# Patient Record
Sex: MALE | Race: WHITE | NOT HISPANIC OR LATINO | Employment: OTHER | URBAN - METROPOLITAN AREA
[De-identification: names, ages, dates, MRNs, and addresses within clinical notes are randomized per-mention and may not be internally consistent; named-entity substitution may affect disease eponyms.]

---

## 2019-05-14 ENCOUNTER — TRANSFERRED RECORDS (OUTPATIENT)
Dept: HEALTH INFORMATION MANAGEMENT | Facility: CLINIC | Age: 74
End: 2019-05-14

## 2019-05-23 ENCOUNTER — TRANSFERRED RECORDS (OUTPATIENT)
Dept: HEALTH INFORMATION MANAGEMENT | Facility: CLINIC | Age: 74
End: 2019-05-23

## 2019-09-03 ENCOUNTER — TRANSFERRED RECORDS (OUTPATIENT)
Dept: HEALTH INFORMATION MANAGEMENT | Facility: CLINIC | Age: 74
End: 2019-09-03

## 2019-11-14 ENCOUNTER — TRANSFERRED RECORDS (OUTPATIENT)
Dept: HEALTH INFORMATION MANAGEMENT | Facility: CLINIC | Age: 74
End: 2019-11-14

## 2020-03-31 RX ORDER — ASPIRIN 81 MG/1
81 TABLET ORAL DAILY
COMMUNITY
End: 2022-01-25 | Stop reason: ALTCHOICE

## 2020-03-31 RX ORDER — PIOGLITAZONEHYDROCHLORIDE 15 MG/1
15 TABLET ORAL DAILY
COMMUNITY
End: 2020-03-31

## 2020-04-02 ENCOUNTER — OFFICE VISIT (OUTPATIENT)
Dept: FAMILY MEDICINE | Facility: CLINIC | Age: 75
End: 2020-04-02

## 2020-04-02 VITALS
DIASTOLIC BLOOD PRESSURE: 68 MMHG | HEIGHT: 75 IN | HEART RATE: 76 BPM | WEIGHT: 240 LBS | RESPIRATION RATE: 20 BRPM | SYSTOLIC BLOOD PRESSURE: 120 MMHG | TEMPERATURE: 97.8 F | BODY MASS INDEX: 29.84 KG/M2

## 2020-04-02 DIAGNOSIS — I10 ESSENTIAL HYPERTENSION: ICD-10-CM

## 2020-04-02 DIAGNOSIS — E78.2 MIXED HYPERLIPIDEMIA: ICD-10-CM

## 2020-04-02 DIAGNOSIS — Z01.818 PRE-OP EXAM: Primary | ICD-10-CM

## 2020-04-02 DIAGNOSIS — Z71.89 ACP (ADVANCE CARE PLANNING): ICD-10-CM

## 2020-04-02 DIAGNOSIS — S82.892D CLOSED FRACTURE OF LEFT ANKLE WITH ROUTINE HEALING, SUBSEQUENT ENCOUNTER: ICD-10-CM

## 2020-04-02 DIAGNOSIS — E11.9 TYPE 2 DIABETES MELLITUS WITHOUT COMPLICATION, WITHOUT LONG-TERM CURRENT USE OF INSULIN (H): ICD-10-CM

## 2020-04-02 DIAGNOSIS — Z76.89 HEALTH CARE HOME: ICD-10-CM

## 2020-04-02 PROBLEM — K57.92 ACUTE DIVERTICULITIS: Status: ACTIVE | Noted: 2020-03-06

## 2020-04-02 PROBLEM — E11.65 UNCONTROLLED TYPE 2 DIABETES MELLITUS WITH HYPERGLYCEMIA (H): Status: ACTIVE | Noted: 2019-01-10

## 2020-04-02 LAB — HBA1C MFR BLD: 8.9 % (ref 4–7)

## 2020-04-02 PROCEDURE — 83036 HEMOGLOBIN GLYCOSYLATED A1C: CPT | Performed by: FAMILY MEDICINE

## 2020-04-02 PROCEDURE — 36415 COLL VENOUS BLD VENIPUNCTURE: CPT | Performed by: FAMILY MEDICINE

## 2020-04-02 PROCEDURE — 99203 OFFICE O/P NEW LOW 30 MIN: CPT | Performed by: FAMILY MEDICINE

## 2020-04-02 RX ORDER — ATORVASTATIN CALCIUM 20 MG/1
20 TABLET, FILM COATED ORAL DAILY
COMMUNITY
Start: 2020-04-02 | End: 2020-07-30

## 2020-04-02 RX ORDER — LEVOTHYROXINE SODIUM 50 UG/1
TABLET ORAL DAILY
COMMUNITY
Start: 2020-04-02 | End: 2020-07-30

## 2020-04-02 RX ORDER — GLIMEPIRIDE 2 MG/1
TABLET ORAL
COMMUNITY
Start: 2020-04-02 | End: 2020-07-30

## 2020-04-02 RX ORDER — PIOGLITAZONEHYDROCHLORIDE 15 MG/1
TABLET ORAL DAILY
COMMUNITY
Start: 2020-04-02 | End: 2020-04-02

## 2020-04-02 RX ORDER — PIOGLITAZONEHYDROCHLORIDE 45 MG/1
45 TABLET ORAL DAILY
COMMUNITY
Start: 2020-04-02 | End: 2020-07-30

## 2020-04-02 ASSESSMENT — MIFFLIN-ST. JEOR: SCORE: 1901.32

## 2020-04-02 NOTE — NURSING NOTE
Gaetano Sheriff is here for a pre-op exam.    Questioned patient about current smoking habits.  Pt. has never smoked.  PULSE regular  My Chart: declines  CLASSIFICATION OF OVERWEIGHT AND OBESITY BY BMI                        Obesity Class           BMI(kg/m2)  Underweight                                    < 18.5  Normal                                         18.5-24.9  Overweight                                     25.0-29.9  OBESITY                     I                  30.0-34.9                             II                 35.0-39.9  EXTREME OBESITY             III                >40                            Patient's  BMI Body mass index is 30.4 kg/m .  http://hin.nhlbi.nih.gov/menuplanner/menu.cgi  Pre-visit planning  Immunizations - up to date  Colonoscopy -   Mammogram -   Asthma -   PHQ9 -    SHERWIN-7 -

## 2020-04-02 NOTE — PROGRESS NOTES
[facility]  [address]  [address]  [facility] [address]  [phone]  Dept: [phone]    PRE-OP EVALUATION:  Today's date: 2020    Gaetano Sheriff (: 1945) presents for pre-operative evaluation assessment as requested by Dr. Apple.  He requires evaluation and anesthesia risk assessment prior to undergoing surgery/procedure for treatment of left ankle .    Proposed Surgery/ Procedure: Left ankle  Date of Surgery/ Procedure: 20  Time of Surgery/ Procedure: 11am  Hospital/Surgical Facility: [facility]  Fax number for surgical facility:   Primary Physician: Lm Garnica  Type of Anesthesia Anticipated: to be determined    Patient has a Health Care Directive or Living Will:  YES     1. NO - Do you have a history of heart attack, stroke, stent, bypass or surgery on an artery in the head, neck, heart or legs?  2. NO - Do you ever have any pain or discomfort in your chest?  3. NO - Do you have a history of  Heart Failure?  4. NO - Are you troubled by shortness of breath when: walking on the level, up a slight hill or at night?  5. NO - Do you currently have a cold, bronchitis or other respiratory infection?  6. NO - Do you have a cough, shortness of breath or wheezing?  7. NO - Do you sometimes get pains in the calves of your legs when you walk?  8. NO - Do you or anyone in your family have previous history of blood clots?  9. NO - Do you or does anyone in your family have a serious bleeding problem such as prolonged bleeding following surgeries or cuts?  10. NO - Have you ever had problems with anemia or been told to take iron pills?  11. NO - Have you had any abnormal blood loss such as black, tarry or bloody stools, or abnormal vaginal bleeding?  12. NO - Have you ever had a blood transfusion?  13. NO - Have you or any of your relatives ever had problems with anesthesia?  14. NO - Do you have sleep apnea, excessive snoring or daytime drowsiness?  15. NO - Do you  have any prosthetic heart valves?  16. NO - Do you have prosthetic joints?  17. NO - Is there any chance that you may be pregnant?      HPI:     HPI related to upcoming procedure: left trimaleolar fracture 3/15/20      See problem list for active medical problems.  Problems all longstanding and stable, except as noted/documented.  See ROS for pertinent symptoms related to these conditions.    DIABETES - Patient has a longstanding history of DiabetesType Type II . Patient is being treated with oral agents and denies significant side effects. Control has been fair. Complicating factors include but are not limited to: hypertension and hyperlipidemia.     HYPERLIPIDEMIA - Patient has a long history of significant Hyperlipidemia requiring medication for treatment with recent good control. Patient reports no problems or side effects with the medication.     HYPERTENSION - Patient has longstanding history of HTN , currently denies any symptoms referable to elevated blood pressure. Specifically denies chest pain, palpitations, dyspnea, orthopnea, PND or peripheral edema. Blood pressure readings have been in normal range. Current medication regimen is as listed below. Patient denies any side effects of medication.     HYPOTHYROIDISM - Patient has a longstanding history of chronic Hypothyroidism. Patient has been doing well, noting no tremor, insomnia, hair loss or changes in skin texture. Continues to take medications as directed, without adverse reactions or side effects. Last TSH No results found for: TSH.        MEDICAL HISTORY:     Patient Active Problem List    Diagnosis Date Noted     Essential hypertension 04/02/2020     Priority: Medium     Type 2 diabetes mellitus without complication, without long-term current use of insulin (H) 04/02/2020     Priority: Medium     Acute diverticulitis 03/06/2020     Priority: Medium     BMI 30.0-30.9,adult 01/10/2019     Priority: Medium     Last Assessment & Plan:   Gaetano Ozuna  Body mass index is 31.42 kg/m . during today's visit. We have discussed his ideal BMI and nutrition and physical activity. We will continue to monitor on future visits.    gained weight due to vacation       Uncontrolled type 2 diabetes mellitus with hyperglycemia (H) 01/10/2019     Priority: Medium     Last Assessment & Plan:   A1c: 7.5      Diet: Discussed in great details about healthy choices and portion control.    Exercise: If possible try to walk at least 30 to 45 minutes a day, at least 5 times a week.    Metformin 1000 BID    Jardiance 25 mg daily    Glimepiride 2 mg BID before meals    Actos 45 mg daily    BS- 2-3 times a day  Last Assessment & Plan:   A1c: 7.5      Diet: Discussed in great details about healthy choices and portion control.    Exercise: If possible try to walk at least 30 to 45 minutes a day, at least 5 times a week.    Metformin 1000 BID    Jardiance 25 mg daily    Glimepiride 2 mg BID before meals    Actos 45 mg daily    BS- 2-3 times a day       Acquired hypothyroidism 10/18/2016     Priority: Medium     Mixed hyperlipidemia 10/18/2016     Priority: Medium     Atherosclerotic heart disease 06/11/2009     Priority: Medium     Presence of cardiac pacemaker 01/01/2001     Priority: Medium     Leads left in and replaced in 2010.        Past Medical History:   Diagnosis Date     Arrhythmia      Chronic infection      Diabetes (H)     type 2     High cholesterol      Hypertension      Pacemaker     Medtronic     Thyroid disease      Past Surgical History:   Procedure Laterality Date     IMPLANT PACEMAKER      Also Replaced battery x 1     ORTHOPEDIC SURGERY Left     fracture repaired     Current Outpatient Medications   Medication Sig Dispense Refill     aspirin 81 MG EC tablet Take 81 mg by mouth daily       atorvastatin (LIPITOR) 20 MG tablet Take 1 tablet (20 mg) by mouth daily       empagliflozin (JARDIANCE) 25 MG TABS tablet Take by mouth daily       glimepiride (AMARYL) 2 MG tablet Take  "by mouth daily (with breakfast)       levothyroxine (SYNTHROID/LEVOTHROID) 50 MCG tablet Take by mouth daily       metFORMIN (GLUCOPHAGE) 500 MG tablet Take 2 tablets (1,000 mg) by mouth 2 times daily (with meals)       pioglitazone (ACTOS) 45 MG tablet Take 1 tablet (45 mg) by mouth daily       OTC products: Aspirin    No Known Allergies   Latex Allergy: NO    Social History     Tobacco Use     Smoking status: Never Smoker     Smokeless tobacco: Never Used   Substance Use Topics     Alcohol use: Yes     Comment: Occas     History   Drug Use Unknown       REVIEW OF SYSTEMS:   CONSTITUTIONAL: NEGATIVE for fever, chills, change in weight  ENT/MOUTH: NEGATIVE for ear, mouth and throat problems  RESP: NEGATIVE for significant cough or SOB  CV: NEGATIVE for chest pain, palpitations or peripheral edema  GI: NEGATIVE for nausea, abdominal pain, heartburn, or change in bowel habits  PSYCHIATRIC: NEGATIVE for changes in mood or affect    EXAM:   Ht 1.892 m (6' 2.5\")   Wt 108.9 kg (240 lb)   BMI 30.40 kg/m    GENERAL APPEARANCE: healthy, alert and no distress  HENT: ear canals and TM's normal and nose and mouth without ulcers or lesions  RESP: lungs clear to auscultation - no rales, rhonchi or wheezes  CV: regular rate and rhythm, normal S1 S2, no S3 or S4 and no murmur, click or rub   ABDOMEN: soft, nontender, no HSM or masses and bowel sounds normal  NEURO: Normal strength and tone, sensory exam grossly normal, mentation intact and speech normal  PSYCH: mentation appears normal and affect normal/bright    DIAGNOSTICS:     EKG: Not indicated due to non-vascular surgery and last ekg on 3/5/20 through ED IN [place](notes in [facility] Care Everywhere) (within 30 days for CAD history or last year for cardiac risk factors)    Pt also had normal nuclear stress echo 9/19- results also in Care Everywhere from [place]    Pt had HGB 14.0 on 3/5/20 through Ed in [place]    Cr 0.98 on 3/5/20      Labs Resulted Today:   Results for orders placed or " performed in visit on 04/02/20   Hemoglobin A1c (BFP)     Status: Abnormal   Result Value Ref Range    Hemoglobin A1C 8.9 (A) 4.0 - 7.0 %           IMPRESSION:   Reason for surgery/procedure: left ankle fracture    The proposed surgical procedure is considered INTERMEDIATE risk.    REVISED CARDIAC RISK INDEX  The patient has the following serious cardiovascular risks for perioperative complications such as (MI, PE, VFib and 3  AV Block):  No serious cardiac risks  INTERPRETATION: 1 risks: Class II (low risk - 0.9% complication rate)    The patient has the following additional risks for perioperative complications:  No identified additional risks      ICD-10-CM    1. Pre-op exam  Z01.818 VENOUS COLLECTION     Hemoglobin A1c (BFP)   2. Closed fracture of left ankle with routine healing, subsequent encounter  S82.892D    3. Type 2 diabetes mellitus without complication, without long-term current use of insulin (H)  E11.9 VENOUS COLLECTION     Hemoglobin A1c (BFP)   4. Mixed hyperlipidemia  E78.2    5. Essential hypertension  I10        RECOMMENDATIONS:     Pt. A1C suboptimal-recommend aggressive control measures post op to aid healing    Pt will hold all meds morning of surgery      Diabetes Medication Use  -----Hold usual oral and non-insulin diabetic meds (e.g. Metformin, Actos, Glipizide) while NPO.       Anticoagulant or Antiplatelet Medication Use  ASPIRIN: Discontinue ASA 7-10 days prior to procedure to reduce bleeding risk.  It should be resumed post-operatively.        APPROVAL GIVEN to proceed with proposed procedure, without further diagnostic evaluation       Signed Electronically by: Lm Garnica MD    Copy of this evaluation report is provided to requesting physician.    [facility] Preop Guidelines    Revised Cardiac Risk Index

## 2020-04-06 ENCOUNTER — ANESTHESIA (OUTPATIENT)
Dept: SURGERY | Facility: CLINIC | Age: 75
End: 2020-04-06
Payer: MEDICARE

## 2020-04-06 ENCOUNTER — APPOINTMENT (OUTPATIENT)
Dept: GENERAL RADIOLOGY | Facility: CLINIC | Age: 75
End: 2020-04-06
Attending: ORTHOPAEDIC SURGERY
Payer: MEDICARE

## 2020-04-06 ENCOUNTER — ANESTHESIA EVENT (OUTPATIENT)
Dept: SURGERY | Facility: CLINIC | Age: 75
End: 2020-04-06
Payer: MEDICARE

## 2020-04-06 ENCOUNTER — HOSPITAL ENCOUNTER (OUTPATIENT)
Facility: CLINIC | Age: 75
Discharge: HOME | End: 2020-04-06
Attending: ORTHOPAEDIC SURGERY | Admitting: ORTHOPAEDIC SURGERY
Payer: MEDICARE

## 2020-04-06 VITALS
HEART RATE: 75 BPM | SYSTOLIC BLOOD PRESSURE: 135 MMHG | BODY MASS INDEX: 30.21 KG/M2 | OXYGEN SATURATION: 94 % | DIASTOLIC BLOOD PRESSURE: 69 MMHG | RESPIRATION RATE: 14 BRPM | HEIGHT: 75 IN | WEIGHT: 243 LBS | TEMPERATURE: 96.3 F

## 2020-04-06 DIAGNOSIS — S82.842A BIMALLEOLAR ANKLE FRACTURE, LEFT, CLOSED, INITIAL ENCOUNTER: Primary | ICD-10-CM

## 2020-04-06 LAB
GLUCOSE BLDC GLUCOMTR-MCNC: 119 MG/DL (ref 70–99)
GLUCOSE BLDC GLUCOMTR-MCNC: 134 MG/DL (ref 70–99)

## 2020-04-06 PROCEDURE — 71000027 ZZH RECOVERY PHASE 2 EACH 15 MINS: Performed by: ORTHOPAEDIC SURGERY

## 2020-04-06 PROCEDURE — 25000128 H RX IP 250 OP 636: Performed by: NURSE ANESTHETIST, CERTIFIED REGISTERED

## 2020-04-06 PROCEDURE — 25000132 ZZH RX MED GY IP 250 OP 250 PS 637: Mod: GY | Performed by: ORTHOPAEDIC SURGERY

## 2020-04-06 PROCEDURE — 25000125 ZZHC RX 250: Performed by: ANESTHESIOLOGY

## 2020-04-06 PROCEDURE — 25000125 ZZHC RX 250: Performed by: NURSE ANESTHETIST, CERTIFIED REGISTERED

## 2020-04-06 PROCEDURE — 82962 GLUCOSE BLOOD TEST: CPT

## 2020-04-06 PROCEDURE — 71000012 ZZH RECOVERY PHASE 1 LEVEL 1 FIRST HR: Performed by: ORTHOPAEDIC SURGERY

## 2020-04-06 PROCEDURE — 25800030 ZZH RX IP 258 OP 636: Performed by: ANESTHESIOLOGY

## 2020-04-06 PROCEDURE — 37000008 ZZH ANESTHESIA TECHNICAL FEE, 1ST 30 MIN: Performed by: ORTHOPAEDIC SURGERY

## 2020-04-06 PROCEDURE — 25000128 H RX IP 250 OP 636: Performed by: ANESTHESIOLOGY

## 2020-04-06 PROCEDURE — 93010 ELECTROCARDIOGRAM REPORT: CPT | Performed by: INTERNAL MEDICINE

## 2020-04-06 PROCEDURE — 27210794 ZZH OR GENERAL SUPPLY STERILE: Performed by: ORTHOPAEDIC SURGERY

## 2020-04-06 PROCEDURE — 25000128 H RX IP 250 OP 636: Performed by: PHYSICIAN ASSISTANT

## 2020-04-06 PROCEDURE — 40000306 ZZH STATISTIC PRE PROC ASSESS II: Performed by: ORTHOPAEDIC SURGERY

## 2020-04-06 PROCEDURE — 36000063 ZZH SURGERY LEVEL 4 EA 15 ADDTL MIN: Performed by: ORTHOPAEDIC SURGERY

## 2020-04-06 PROCEDURE — 71000013 ZZH RECOVERY PHASE 1 LEVEL 1 EA ADDTL HR: Performed by: ORTHOPAEDIC SURGERY

## 2020-04-06 PROCEDURE — 40000277 XR SURGERY CARM FLUORO LESS THAN 5 MIN W STILLS: Mod: TC

## 2020-04-06 PROCEDURE — 25000132 ZZH RX MED GY IP 250 OP 250 PS 637: Mod: GY | Performed by: ANESTHESIOLOGY

## 2020-04-06 PROCEDURE — C1713 ANCHOR/SCREW BN/BN,TIS/BN: HCPCS | Performed by: ORTHOPAEDIC SURGERY

## 2020-04-06 PROCEDURE — 25000125 ZZHC RX 250: Performed by: ORTHOPAEDIC SURGERY

## 2020-04-06 PROCEDURE — 37000009 ZZH ANESTHESIA TECHNICAL FEE, EACH ADDTL 15 MIN: Performed by: ORTHOPAEDIC SURGERY

## 2020-04-06 PROCEDURE — 36000065 ZZH SURGERY LEVEL 4 W FLUORO 1ST 30 MIN: Performed by: ORTHOPAEDIC SURGERY

## 2020-04-06 DEVICE — IMP SCR SYN CORTEX 3.5X45MM SELF TAP SS 204.845: Type: IMPLANTABLE DEVICE | Site: ANKLE | Status: FUNCTIONAL

## 2020-04-06 DEVICE — IMP SCR SYN CAN 4.0X16MM FT SS 206.016: Type: IMPLANTABLE DEVICE | Site: ANKLE | Status: FUNCTIONAL

## 2020-04-06 DEVICE — IMP SCR SYN CORTEX 3.5X14MM SELF TAP SS 204.814: Type: IMPLANTABLE DEVICE | Site: ANKLE | Status: FUNCTIONAL

## 2020-04-06 DEVICE — IMP SCR SYN CORTEX 3.5X18MM SELF TAP SS 204.818: Type: IMPLANTABLE DEVICE | Site: ANKLE | Status: FUNCTIONAL

## 2020-04-06 DEVICE — IMP SCR SYN CAN 4.0X18MM FT SS 206.018: Type: IMPLANTABLE DEVICE | Site: ANKLE | Status: FUNCTIONAL

## 2020-04-06 DEVICE — IMP PLATE SYN 1/3 TUBULAR 73MM 06H SS 241.36: Type: IMPLANTABLE DEVICE | Site: ANKLE | Status: FUNCTIONAL

## 2020-04-06 RX ORDER — ONDANSETRON 4 MG/1
4 TABLET, ORALLY DISINTEGRATING ORAL
Status: DISCONTINUED | OUTPATIENT
Start: 2020-04-06 | End: 2020-04-06 | Stop reason: HOSPADM

## 2020-04-06 RX ORDER — PROPOFOL 10 MG/ML
INJECTION, EMULSION INTRAVENOUS PRN
Status: DISCONTINUED | OUTPATIENT
Start: 2020-04-06 | End: 2020-04-06

## 2020-04-06 RX ORDER — HYDRALAZINE HYDROCHLORIDE 20 MG/ML
2.5-5 INJECTION INTRAMUSCULAR; INTRAVENOUS EVERY 10 MIN PRN
Status: DISCONTINUED | OUTPATIENT
Start: 2020-04-06 | End: 2020-04-06 | Stop reason: HOSPADM

## 2020-04-06 RX ORDER — DEXAMETHASONE SODIUM PHOSPHATE 4 MG/ML
INJECTION, SOLUTION INTRA-ARTICULAR; INTRALESIONAL; INTRAMUSCULAR; INTRAVENOUS; SOFT TISSUE PRN
Status: DISCONTINUED | OUTPATIENT
Start: 2020-04-06 | End: 2020-04-06

## 2020-04-06 RX ORDER — PROPOFOL 10 MG/ML
INJECTION, EMULSION INTRAVENOUS CONTINUOUS PRN
Status: DISCONTINUED | OUTPATIENT
Start: 2020-04-06 | End: 2020-04-06

## 2020-04-06 RX ORDER — OXYCODONE HYDROCHLORIDE 5 MG/1
5 TABLET ORAL
Status: DISCONTINUED | OUTPATIENT
Start: 2020-04-06 | End: 2020-04-06

## 2020-04-06 RX ORDER — FENTANYL CITRATE 50 UG/ML
25-50 INJECTION, SOLUTION INTRAMUSCULAR; INTRAVENOUS EVERY 5 MIN PRN
Status: DISCONTINUED | OUTPATIENT
Start: 2020-04-06 | End: 2020-04-06 | Stop reason: HOSPADM

## 2020-04-06 RX ORDER — SODIUM CHLORIDE, SODIUM LACTATE, POTASSIUM CHLORIDE, CALCIUM CHLORIDE 600; 310; 30; 20 MG/100ML; MG/100ML; MG/100ML; MG/100ML
INJECTION, SOLUTION INTRAVENOUS CONTINUOUS
Status: DISCONTINUED | OUTPATIENT
Start: 2020-04-06 | End: 2020-04-06 | Stop reason: HOSPADM

## 2020-04-06 RX ORDER — LIDOCAINE HYDROCHLORIDE 10 MG/ML
INJECTION, SOLUTION INFILTRATION; PERINEURAL PRN
Status: DISCONTINUED | OUTPATIENT
Start: 2020-04-06 | End: 2020-04-06

## 2020-04-06 RX ORDER — LABETALOL 20 MG/4 ML (5 MG/ML) INTRAVENOUS SYRINGE
PRN
Status: DISCONTINUED | OUTPATIENT
Start: 2020-04-06 | End: 2020-04-06

## 2020-04-06 RX ORDER — HYDROXYZINE HYDROCHLORIDE 10 MG/1
10 TABLET, FILM COATED ORAL
Status: COMPLETED | OUTPATIENT
Start: 2020-04-06 | End: 2020-04-06

## 2020-04-06 RX ORDER — ONDANSETRON 2 MG/ML
INJECTION INTRAMUSCULAR; INTRAVENOUS PRN
Status: DISCONTINUED | OUTPATIENT
Start: 2020-04-06 | End: 2020-04-06

## 2020-04-06 RX ORDER — HYDROMORPHONE HYDROCHLORIDE 1 MG/ML
.3-.5 INJECTION, SOLUTION INTRAMUSCULAR; INTRAVENOUS; SUBCUTANEOUS EVERY 10 MIN PRN
Status: DISCONTINUED | OUTPATIENT
Start: 2020-04-06 | End: 2020-04-06 | Stop reason: HOSPADM

## 2020-04-06 RX ORDER — BUPIVACAINE HYDROCHLORIDE 5 MG/ML
INJECTION, SOLUTION EPIDURAL; INTRACAUDAL PRN
Status: DISCONTINUED | OUTPATIENT
Start: 2020-04-06 | End: 2020-04-06 | Stop reason: HOSPADM

## 2020-04-06 RX ORDER — ONDANSETRON 2 MG/ML
4 INJECTION INTRAMUSCULAR; INTRAVENOUS EVERY 30 MIN PRN
Status: DISCONTINUED | OUTPATIENT
Start: 2020-04-06 | End: 2020-04-06 | Stop reason: HOSPADM

## 2020-04-06 RX ORDER — GABAPENTIN 300 MG/1
300 CAPSULE ORAL 3 TIMES DAILY
Qty: 9 CAPSULE | Refills: 0 | Status: SHIPPED | OUTPATIENT
Start: 2020-04-06 | End: 2020-06-23

## 2020-04-06 RX ORDER — ONDANSETRON 4 MG/1
4 TABLET, ORALLY DISINTEGRATING ORAL EVERY 30 MIN PRN
Status: DISCONTINUED | OUTPATIENT
Start: 2020-04-06 | End: 2020-04-06 | Stop reason: HOSPADM

## 2020-04-06 RX ORDER — MAGNESIUM HYDROXIDE 1200 MG/15ML
LIQUID ORAL PRN
Status: DISCONTINUED | OUTPATIENT
Start: 2020-04-06 | End: 2020-04-06 | Stop reason: HOSPADM

## 2020-04-06 RX ORDER — CEFAZOLIN SODIUM 1 G/3ML
1 INJECTION, POWDER, FOR SOLUTION INTRAMUSCULAR; INTRAVENOUS SEE ADMIN INSTRUCTIONS
Status: DISCONTINUED | OUTPATIENT
Start: 2020-04-06 | End: 2020-04-06 | Stop reason: HOSPADM

## 2020-04-06 RX ORDER — IBUPROFEN 600 MG/1
600 TABLET, FILM COATED ORAL
Status: COMPLETED | OUTPATIENT
Start: 2020-04-06 | End: 2020-04-06

## 2020-04-06 RX ORDER — ALBUTEROL SULFATE 0.83 MG/ML
2.5 SOLUTION RESPIRATORY (INHALATION) EVERY 4 HOURS PRN
Status: DISCONTINUED | OUTPATIENT
Start: 2020-04-06 | End: 2020-04-06 | Stop reason: HOSPADM

## 2020-04-06 RX ORDER — NALOXONE HYDROCHLORIDE 0.4 MG/ML
.1-.4 INJECTION, SOLUTION INTRAMUSCULAR; INTRAVENOUS; SUBCUTANEOUS
Status: DISCONTINUED | OUTPATIENT
Start: 2020-04-06 | End: 2020-04-06 | Stop reason: HOSPADM

## 2020-04-06 RX ORDER — IBUPROFEN 600 MG/1
600 TABLET, FILM COATED ORAL EVERY 6 HOURS
Qty: 30 TABLET | Refills: 0 | Status: SHIPPED | OUTPATIENT
Start: 2020-04-06 | End: 2020-06-23

## 2020-04-06 RX ORDER — ACETAMINOPHEN 325 MG/1
975 TABLET ORAL EVERY 6 HOURS
Qty: 50 TABLET | Refills: 0 | Status: SHIPPED | OUTPATIENT
Start: 2020-04-06 | End: 2020-06-23

## 2020-04-06 RX ORDER — FENTANYL CITRATE 50 UG/ML
25-50 INJECTION, SOLUTION INTRAMUSCULAR; INTRAVENOUS
Status: DISCONTINUED | OUTPATIENT
Start: 2020-04-06 | End: 2020-04-06 | Stop reason: HOSPADM

## 2020-04-06 RX ORDER — FENTANYL CITRATE 50 UG/ML
INJECTION, SOLUTION INTRAMUSCULAR; INTRAVENOUS PRN
Status: DISCONTINUED | OUTPATIENT
Start: 2020-04-06 | End: 2020-04-06

## 2020-04-06 RX ORDER — LIDOCAINE 40 MG/G
CREAM TOPICAL
Status: DISCONTINUED | OUTPATIENT
Start: 2020-04-06 | End: 2020-04-06 | Stop reason: HOSPADM

## 2020-04-06 RX ORDER — OXYCODONE HYDROCHLORIDE 5 MG/1
5-10 TABLET ORAL EVERY 4 HOURS PRN
Qty: 12 TABLET | Refills: 0 | Status: SHIPPED | OUTPATIENT
Start: 2020-04-06 | End: 2020-06-23

## 2020-04-06 RX ORDER — CEFAZOLIN SODIUM 2 G/100ML
2 INJECTION, SOLUTION INTRAVENOUS
Status: COMPLETED | OUTPATIENT
Start: 2020-04-06 | End: 2020-04-06

## 2020-04-06 RX ORDER — METOPROLOL TARTRATE 1 MG/ML
1-2 INJECTION, SOLUTION INTRAVENOUS EVERY 5 MIN PRN
Status: DISCONTINUED | OUTPATIENT
Start: 2020-04-06 | End: 2020-04-06 | Stop reason: HOSPADM

## 2020-04-06 RX ORDER — OXYCODONE HYDROCHLORIDE 5 MG/1
5 TABLET ORAL EVERY 4 HOURS PRN
Status: DISCONTINUED | OUTPATIENT
Start: 2020-04-06 | End: 2020-04-06 | Stop reason: HOSPADM

## 2020-04-06 RX ORDER — MEPERIDINE HYDROCHLORIDE 50 MG/ML
12.5 INJECTION INTRAMUSCULAR; INTRAVENOUS; SUBCUTANEOUS
Status: DISCONTINUED | OUTPATIENT
Start: 2020-04-06 | End: 2020-04-06 | Stop reason: HOSPADM

## 2020-04-06 RX ADMIN — LABETALOL 20 MG/4 ML (5 MG/ML) INTRAVENOUS SYRINGE 10 MG: at 12:16

## 2020-04-06 RX ADMIN — FENTANYL CITRATE 50 MCG: 50 INJECTION, SOLUTION INTRAMUSCULAR; INTRAVENOUS at 11:43

## 2020-04-06 RX ADMIN — LIDOCAINE HYDROCHLORIDE 50 MG: 10 INJECTION, SOLUTION INFILTRATION; PERINEURAL at 11:33

## 2020-04-06 RX ADMIN — HYDROMORPHONE HYDROCHLORIDE 0.5 MG: 1 INJECTION, SOLUTION INTRAMUSCULAR; INTRAVENOUS; SUBCUTANEOUS at 14:51

## 2020-04-06 RX ADMIN — SODIUM CHLORIDE, POTASSIUM CHLORIDE, SODIUM LACTATE AND CALCIUM CHLORIDE: 600; 310; 30; 20 INJECTION, SOLUTION INTRAVENOUS at 11:23

## 2020-04-06 RX ADMIN — OXYCODONE HYDROCHLORIDE 5 MG: 5 TABLET ORAL at 14:47

## 2020-04-06 RX ADMIN — PROPOFOL 50 MG: 10 INJECTION, EMULSION INTRAVENOUS at 12:34

## 2020-04-06 RX ADMIN — FENTANYL CITRATE 50 MCG: 50 INJECTION, SOLUTION INTRAMUSCULAR; INTRAVENOUS at 13:09

## 2020-04-06 RX ADMIN — HYDROXYZINE HYDROCHLORIDE 10 MG: 10 TABLET ORAL at 14:47

## 2020-04-06 RX ADMIN — PROPOFOL 50 MCG/KG/MIN: 10 INJECTION, EMULSION INTRAVENOUS at 12:14

## 2020-04-06 RX ADMIN — FENTANYL CITRATE 100 MCG: 50 INJECTION, SOLUTION INTRAMUSCULAR; INTRAVENOUS at 11:33

## 2020-04-06 RX ADMIN — IBUPROFEN 600 MG: 600 TABLET, FILM COATED ORAL at 14:47

## 2020-04-06 RX ADMIN — FENTANYL CITRATE 50 MCG: 50 INJECTION INTRAMUSCULAR; INTRAVENOUS at 13:47

## 2020-04-06 RX ADMIN — DEXAMETHASONE SODIUM PHOSPHATE 4 MG: 4 INJECTION, SOLUTION INTRA-ARTICULAR; INTRALESIONAL; INTRAMUSCULAR; INTRAVENOUS; SOFT TISSUE at 11:33

## 2020-04-06 RX ADMIN — SODIUM CHLORIDE, POTASSIUM CHLORIDE, SODIUM LACTATE AND CALCIUM CHLORIDE: 600; 310; 30; 20 INJECTION, SOLUTION INTRAVENOUS at 12:16

## 2020-04-06 RX ADMIN — ONDANSETRON 4 MG: 4 TABLET, ORALLY DISINTEGRATING ORAL at 16:22

## 2020-04-06 RX ADMIN — CEFAZOLIN SODIUM 2 G: 2 INJECTION, SOLUTION INTRAVENOUS at 11:38

## 2020-04-06 RX ADMIN — Medication 100 MG: at 11:33

## 2020-04-06 RX ADMIN — PROPOFOL 170 MG: 10 INJECTION, EMULSION INTRAVENOUS at 11:33

## 2020-04-06 RX ADMIN — FENTANYL CITRATE 50 MCG: 50 INJECTION, SOLUTION INTRAMUSCULAR; INTRAVENOUS at 11:38

## 2020-04-06 RX ADMIN — PROPOFOL 30 MG: 10 INJECTION, EMULSION INTRAVENOUS at 11:43

## 2020-04-06 RX ADMIN — HYDROMORPHONE HYDROCHLORIDE 0.5 MG: 1 INJECTION, SOLUTION INTRAMUSCULAR; INTRAVENOUS; SUBCUTANEOUS at 13:23

## 2020-04-06 RX ADMIN — HYDROMORPHONE HYDROCHLORIDE 0.5 MG: 1 INJECTION, SOLUTION INTRAMUSCULAR; INTRAVENOUS; SUBCUTANEOUS at 13:32

## 2020-04-06 RX ADMIN — ONDANSETRON HYDROCHLORIDE 4 MG: 2 INJECTION, SOLUTION INTRAVENOUS at 12:33

## 2020-04-06 RX ADMIN — FENTANYL CITRATE 50 MCG: 50 INJECTION, SOLUTION INTRAMUSCULAR; INTRAVENOUS at 13:08

## 2020-04-06 ASSESSMENT — MIFFLIN-ST. JEOR: SCORE: 1914.93

## 2020-04-06 NOTE — ANESTHESIA POSTPROCEDURE EVALUATION
Patient: Gaetano Sheriff    Procedure(s):  Open reduction internal fixation left bimalleolar ankle fracture    Diagnosis:Ankle fracture [S82.899A]  Diagnosis Additional Information: No value filed.    Anesthesia Type:  General    Note:  Anesthesia Post Evaluation    Patient location during evaluation: PACU  Patient participation: Able to fully participate in evaluation  Level of consciousness: awake  Pain management: adequate  Airway patency: patent  Cardiovascular status: acceptable  Respiratory status: acceptable  Hydration status: acceptable  PONV: controlled     Anesthetic complications: None          Last vitals:  Vitals:    04/06/20 1445 04/06/20 1500 04/06/20 1543   BP: (!) 159/81 (!) 149/76 (!) 144/70   Pulse:      Resp: 16 12    Temp:  97  F (36.1  C) 96.2  F (35.7  C)   SpO2: 100% 100% 92%         Electronically Signed By: Singh Rojas MD  April 6, 2020  3:46 PM

## 2020-04-06 NOTE — BRIEF OP NOTE
[facility]    Brief Operative Note    Pre-operative diagnosis: Bimalleolar Ankle fracture, Left [S82.892I]  Post-operative diagnosis Same as pre-operative diagnosis    Procedure: Procedure(s):  Open reduction internal fixation left bimalleolar ankle fracture  Surgeon: Surgeon(s) and Role:     * Kaz Apple MD - Primary     * Althea Cohn PA-C - Assisting  Anesthesia: General   Estimated blood loss: Less than 50 ml  Drains: None  Specimens: * No specimens in log *  Findings:   None.  Complications: None.  Implants:   Implant Name Type Inv. Item Serial No.  Lot No. LRB No. Used Action   IMP PLATE SYN 1/3 TUBULAR 73MM 06H .36 Metallic Hardware/[place] IMP PLATE SYN 1/3 TUBULAR 73MM 06H .36  SYNTHES-STRATEC LOAD [id number] 30 MAR 2020 Left 1 Implanted   IMP SCR SYN CAN 4.0X16MM FT .016 Metallic Hardware/[place] IMP SCR SYN CAN 4.0X16MM FT .016  SYNTHES-STRATEC LOAD [id number] 30 MAR 2020 Left 1 Implanted   IMP SCR SYN CAN 4.0X18MM FT .018 Metallic Hardware/[place] IMP SCR SYN CAN 4.0X18MM FT .018  SYNTHES-STRATEC LOAD [id number] 30 MAR 2020 Left 1 Implanted   IMP SCR SYN CORTEX 3.5X14MM SELF TAP .814 Metallic Hardware/[place] IMP SCR SYN CORTEX 3.5X14MM SELF TAP .814  SYNTHES-STRATEC LOAD [id number] 30 MAR 2020 Left 1 Implanted   IMP SCR SYN CORTEX 3.5X16MM SELF TAP .816 Metallic Hardware/[place] IMP SCR SYN CORTEX 3.5X16MM SELF TAP .816  SYNTHES-STRATEC LOAD [id number] 30 MAR 2020 Left 1 Wasted   IMP SCR SYN CORTEX 3.5X18MM SELF TAP .818 Metallic Hardware/[place] IMP SCR SYN CORTEX 3.5X18MM SELF TAP .818  SYNTHES-STRATEC LOAD [id number] 30 MAR 2020 Left 2 Implanted   IMP SCR SYN CORTEX 3.5X24MM SELF TAP .824 Metallic Hardware/[place] IMP SCR SYN CORTEX 3.5X24MM SELF TAP .824  SYNTHES-STRATEC LOAD [id number] 30 MAR 2020 Left 1 Wasted   IMP SCR SYN CORTEX 3.5X45MM SELF TAP .845 Metallic Hardware/[place] IMP SCR SYN  CORTEX 3.5X45MM SELF TAP .845  SYNTHES-STRATEC LOAD [id number] 30 MAR 2020 Left 1 Implanted

## 2020-04-06 NOTE — ANESTHESIA CARE TRANSFER NOTE
Patient: Gaetano Sheriff    Procedure(s):  Open reduction internal fixation left bimalleolar ankle fracture    Diagnosis: Ankle fracture [S82.899A]  Diagnosis Additional Information: No value filed.    Anesthesia Type:   General     Note:  Airway :Face Mask  Patient transferred to:PACU  Comments: Pt transferred to PACU; VSS; Report to RN; Fentanyl 100mg given in PACU for pain. Pt reports pain tolerable with fentanyl administration. Handoff Report: Identifed the Patient, Identified the Reponsible Provider, Reviewed the pertinent medical history, Discussed the surgical course, Reviewed Intra-OP anesthesia mangement and issues during anesthesia, Set expectations for post-procedure period and Allowed opportunity for questions and acknowledgement of understanding      Vitals: (Last set prior to Anesthesia Care Transfer)    CRNA VITALS  4/6/2020 1231 - 4/6/2020 1310      4/6/2020             Pulse:  82    SpO2:  (!) 81 %                Electronically Signed By: REINALDO Soto CRNA  April 6, 2020  1:10 PM

## 2020-04-06 NOTE — DISCHARGE INSTRUCTIONS
DR. YOSVANY MALDONADO M.D.          CLINIC PHONE NUMBER:  [phone]       [facility]        ANKLE FRACTURE FIXATION DISCHARGE INSTRUCTIONS      Activity  Keep your leg elevated with a pillow under your calf, not under the knee.    You should attempt to keep your knee above the level of your heart and your ankle above the level of your knee for the first 2-3 days.  This is the best position to reduce swelling.  If you have throbbing pain in your ankle, you need to keep your ankle elevated more often.    You are to be non-weight bearing, so use your crutches or a walker at all times.    Dressing  Your ankle was placed in a splint after surgery, do not remove this splint.  This will be removed at your first post-operative appointment.    Keep your cast dry.    Ice Packs  When you get home, keep ice on the ankle for the first 24 hours and keep it cold.  After the first day place ice packs on your ankle for 20-30 minutes for 4-5 times a day.    Pain Control  Take the pain medication and/or anti-inflammatory medication as prescribed.  Don't let your pain become severe.    Office Return  Please call your surgeon's office in the first day or two after surgery to schedule a post-operative visit.  Your appointment should be fourteen days after surgery.    If at any time there are any signs of infection:  increased swelling, redness, drainage from the incisions, warmth, fever, chills or severe pain unrelieved by the prescribed medications or if you have any questions or concerns, contact your surgeon.              GENERAL ANESTHESIA OR SEDATION ADULT DISCHARGE INSTRUCTIONS   SPECIAL PRECAUTIONS FOR 24 HOURS AFTER SURGERY    IT IS NOT UNUSUAL TO FEEL LIGHT-HEADED OR FAINT, UP TO 24 HOURS AFTER SURGERY OR WHILE TAKING PAIN MEDICATION.  IF YOU HAVE THESE SYMPTOMS; SIT FOR A FEW MINUTES BEFORE STANDING AND HAVE SOMEONE ASSIST YOU WHEN YOU GET UP TO WALK OR USE THE BATHROOM.    YOU SHOULD REST AND RELAX FOR THE NEXT  24 HOURS AND YOU MUST MAKE ARRANGEMENTS TO HAVE SOMEONE STAY WITH YOU FOR AT LEAST 24 HOURS AFTER YOUR DISCHARGE.  AVOID HAZARDOUS AND STRENUOUS ACTIVITIES.  DO NOT MAKE IMPORTANT DECISIONS FOR 24 HOURS.    DO NOT DRIVE ANY VEHICLE OR OPERATE MECHANICAL EQUIPMENT FOR 24 HOURS FOLLOWING THE END OF YOUR SURGERY.  EVEN THOUGH YOU MAY FEEL NORMAL, YOUR REACTIONS MAY BE AFFECTED BY THE MEDICATION YOU HAVE RECEIVED.    DO NOT DRINK ALCOHOLIC BEVERAGES FOR 24 HOURS FOLLOWING YOUR SURGERY.    DRINK CLEAR LIQUIDS (APPLE JUICE, GINGER ALE, 7-UP, BROTH, ETC.).  PROGRESS TO YOUR REGULAR DIET AS YOU FEEL ABLE.    YOU MAY HAVE A DRY MOUTH, A SORE THROAT, MUSCLES ACHES OR TROUBLE SLEEPING.  THESE SHOULD GO AWAY AFTER 24 HOURS.    CALL YOUR DOCTOR FOR ANY OF THE FOLLOWING:  SIGNS OF INFECTION (FEVER, GROWING TENDERNESS AT THE SURGERY SITE, A LARGE AMOUNT OF DRAINAGE OR BLEEDING, SEVERE PAIN, FOUL-SMELLING DRAINAGE, REDNESS OR SWELLING.    IT HAS BEEN OVER 8 TO 10 HOURS SINCE SURGERY AND YOU ARE STILL NOT ABLE TO URINATE (PASS WATER).

## 2020-04-06 NOTE — ANESTHESIA PREPROCEDURE EVALUATION
Anesthesia Pre-Procedure Evaluation    Patient: Gaetano Sheriff   MRN: [id number] : 1945          Preoperative Diagnosis: Ankle fracture [S82.899A]    Procedure(s):  Open reduction internal fixation left bimalleolar ankle fracture    Past Medical History:   Diagnosis Date     Arrhythmia      Chronic infection      Diabetes (H)     type 2     High cholesterol      Hypertension      Pacemaker     Medtronic     Thyroid disease      Past Surgical History:   Procedure Laterality Date     IMPLANT PACEMAKER      Also Replaced battery x 1     ORTHOPEDIC SURGERY Left     fracture repaired     Anesthesia Evaluation     . Pt has had prior anesthetic.            ROS/MED HX    ENT/Pulmonary:  - neg pulmonary ROS    (-) sleep apnea   Neurologic:       Cardiovascular:     (+) Dyslipidemia, hypertension--CAD, --. : . . . pacemaker :. .       METS/Exercise Tolerance:     Hematologic:         Musculoskeletal:         GI/Hepatic:        (-) GERD   Renal/Genitourinary:         Endo:     (+) type II DM thyroid problem Obesity, .      Psychiatric:         Infectious Disease:         Malignancy:         Other:                          Physical Exam      Airway   Mallampati: II  TM distance: >3 FB  Neck ROM: full    Dental     Cardiovascular   Rhythm and rate: regular and normal      Pulmonary    breath sounds clear to auscultation            No results found for: WBC, HGB, HCT, PLT, CRP, SED, NA, POTASSIUM, CHLORIDE, CO2, BUN, CR, GLC, OFELIA, PHOS, MAG, ALBUMIN, PROTTOTAL, ALT, AST, GGT, ALKPHOS, BILITOTAL, BILIDIRECT, LIPASE, AMYLASE, CUBA, PTT, INR, FIBR, TSH, T4, T3, HCG, HCGS, CKTOTAL, CKMB, TROPN    Preop Vitals  BP Readings from Last 3 Encounters:   20 120/68    Pulse Readings from Last 3 Encounters:   20 76      Resp Readings from Last 3 Encounters:   20 20    SpO2 Readings from Last 3 Encounters:   No data found for SpO2      Temp Readings from Last 1 Encounters:   20 97.8  F (36.6  C) (Oral)    Ht  "Readings from Last 1 Encounters:   04/02/20 1.892 m (6' 2.5\")      Wt Readings from Last 1 Encounters:   04/02/20 108.9 kg (240 lb)    Estimated body mass index is 30.4 kg/m  as calculated from the following:    Height as of 4/2/20: 1.892 m (6' 2.5\").    Weight as of 4/2/20: 108.9 kg (240 lb).       Anesthesia Plan      History & Physical Review  History and physical reviewed and following examination; no interval change.    ASA Status:  3 .    NPO Status:  > 8 hours    Plan for General with Propofol and Intravenous induction. Maintenance will be Balanced.    PONV prophylaxis:  Ondansetron (or other 5HT-3) and Dexamethasone or Solumedrol         Postoperative Care  Postoperative pain management:  IV analgesics, Oral pain medications and Multi-modal analgesia.      Consents  Anesthetic plan, risks, benefits and alternatives discussed with:  Patient..                 Singh Rojas MD                    .  "

## 2020-04-07 LAB — INTERPRETATION ECG - MUSE: NORMAL

## 2020-04-07 NOTE — OP NOTE
Procedure Date: 04/06/2020      PREOPERATIVE DIAGNOSES:  Bimalleolar ankle fracture, left.      POSTOPERATIVE DIAGNOSES:  Bimalleolar ankle fracture, left.      PROCEDURES:  Open reduction and internal fixation, left bimalleolar ankle fracture.      SURGEON:  Kaz Apple MD.      ASSISTANT:  Marlo Cohn PA-C.      ANESTHESIA:  General plus local.      ESTIMATED BLOOD LOSS:  20 mL.      DRAINS:  None.      SPECIMENS:  None.      COMPLICATIONS:  None.      IMPLANTS:  Small fragment.      INDICATIONS FOR PROCEDURE:  Gaetano Sheriff is a 74-year-old man who recently moved to the [place] from [place].  Unfortunately, while still in [place] preparing to move, he suffered a bimalleolar ankle fracture.  He elected to defer treatment for this until his arrival in [place].  Therefore, at the time of his surgery today, his fracture is approximately 3 weeks old.      Risks, benefits, recovery discussed and he elected to proceed with surgical treatment.      PROCEDURE IN DETAIL AND FINDINGS:  The patient was identified in the preoperative area and appropriately marked.  He was brought to the operating room where general anesthetic was administered and airway secured without difficulty.  Preoperative antibiotic prophylaxis was provided within an hour of the incision.  Prophylaxis was discontinued the day of surgery.  A tourniquet was placed on the left calf.  Left leg and foot were prepped and draped in sterile fashion.  The limb was elevated for exsanguination and the tourniquet inflated.  A pause for the cause was performed.      I began with a longitudinal incision along the distal fibula.  The fracture was identified.  There was a fair amount of soft callus that needed to be debrided and mobilized.  I was able to reduce the fracture and hold this temporarily with a 0.062 inch K-wire.  I attempted to place an interfragmentary lag screw, but could not achieve an appropriate angle.  Therefore, I placed a  posterolateral plate and secured this with 6 screws.      I turned my attention to the medial side.  An oblique incision was made.  The medial malleolus fracture was identified.  This was reduced and provisionally pinned.  I then fixed it with a single 3.5 mm lag screw.      AP, mortise and lateral views of the left ankle were taken and reviewed.  Alignment was good.  Instrumentation was stable.      The wounds were irrigated and closed in layers.  Adaptic, sterile dressings and a well-padded short-leg cast were applied.      The patient tolerated the procedure well.  There were no complications.  All sponge and needle counts were correct.      PLAN:  He will be nonweightbearing in his cast for 2 weeks.  He will then return for cast off, wound check, sutures out and placement of an Aircast boot.  He can begin active and passive range of motion.  At 4 weeks, he could begin progressively applying some pressure to the foot with crutch assistance and in the boot.  I will then see him at 6 weeks for AP, mortise and lateral views of the left ankle and reexamination.         YOSVANY MALDONADO MD             D: 2020   T: 2020   MT: YOHANA      Name:     ANDREW RIGGINS   MRN:      [id number]        Account:        [id number]   :      1945           Procedure Date: 2020      Document: [id number]

## 2020-06-03 ENCOUNTER — OFFICE VISIT (OUTPATIENT)
Dept: FAMILY MEDICINE | Facility: CLINIC | Age: 75
End: 2020-06-03

## 2020-06-03 VITALS
RESPIRATION RATE: 20 BRPM | HEIGHT: 75 IN | WEIGHT: 250.4 LBS | SYSTOLIC BLOOD PRESSURE: 130 MMHG | DIASTOLIC BLOOD PRESSURE: 68 MMHG | TEMPERATURE: 97.9 F | BODY MASS INDEX: 31.13 KG/M2 | HEART RATE: 84 BPM

## 2020-06-03 DIAGNOSIS — B37.9 CANDIDA INFECTION: Primary | ICD-10-CM

## 2020-06-03 PROCEDURE — 99213 OFFICE O/P EST LOW 20 MIN: CPT | Performed by: FAMILY MEDICINE

## 2020-06-03 RX ORDER — NYSTATIN 100000 U/G
CREAM TOPICAL 2 TIMES DAILY
Qty: 30 G | Refills: 1 | Status: SHIPPED | OUTPATIENT
Start: 2020-06-03 | End: 2020-08-31

## 2020-06-03 ASSESSMENT — MIFFLIN-ST. JEOR: SCORE: 1948.5

## 2020-06-03 NOTE — PROGRESS NOTES
SUBJECTIVE: Gaetano Sheriff is a 74 year old male who presents for skin rash above groin for a week, red, placing neosporin and anti itch cream on it but not resolving -itchy still    No fevers    No new meds or supplements    No travel    Patient Active Problem List   Diagnosis     Acquired hypothyroidism     Acute diverticulitis     Atherosclerotic heart disease     Essential hypertension     Mixed hyperlipidemia     BMI 30.0-30.9,adult     Presence of cardiac pacemaker     Uncontrolled type 2 diabetes mellitus with hyperglycemia (H)     Type 2 diabetes mellitus without complication, without long-term current use of insulin (H)     Health Care Home     ACP (advance care planning)     Past Medical History:   Diagnosis Date     Arrhythmia      Chronic infection      Diabetes (H)     type 2     High cholesterol      Hypertension      Pacemaker     Medtronic     Thyroid disease      Family History   Problem Relation Age of Onset     Diabetes Sister      Diabetes Brother      Coronary Artery Disease No family hx of      Cerebrovascular Disease No family hx of      Colon Cancer No family hx of      Prostate Cancer No family hx of      Social History     Socioeconomic History     Marital status:      Spouse name: Va     Number of children: Not on file     Years of education: Not on file     Highest education level: Not on file   Occupational History     Not on file   Social Needs     Financial resource strain: Not on file     Food insecurity     Worry: Not on file     Inability: Not on file     Transportation needs     Medical: Not on file     Non-medical: Not on file   Tobacco Use     Smoking status: Never Smoker     Smokeless tobacco: Never Used   Substance and Sexual Activity     Alcohol use: Yes     Comment: Occas     Drug use: Never     Sexual activity: Not on file   Lifestyle     Physical activity     Days per week: Not on file     Minutes per session: Not on file     Stress: Not on file   Relationships      Social connections     Talks on phone: Not on file     Gets together: Not on file     Attends Oriental orthodox service: Not on file     Active member of club or organization: Not on file     Attends meetings of clubs or organizations: Not on file     Relationship status: Not on file     Intimate partner violence     Fear of current or ex partner: Not on file     Emotionally abused: Not on file     Physically abused: Not on file     Forced sexual activity: Not on file   Other Topics Concern     Not on file   Social History Narrative     Not on file     Past Surgical History:   Procedure Laterality Date     IMPLANT PACEMAKER      Also Replaced battery x 1     OPEN REDUCTION INTERNAL FIXATION ANKLE Left 4/6/2020    Procedure: Open reduction internal fixation left bimalleolar ankle fracture;  Surgeon: Kaz Apple MD;  Location: [facility]     ORTHOPEDIC SURGERY Left     fracture repaired     acetaminophen (TYLENOL) 325 MG tablet, Take 3 tablets (975 mg) by mouth every 6 hours Scheduled x48hrs, then 650-975mg every 6hrs as needed, pain. Alternate with ibuprofen  aspirin 81 MG EC tablet, Take 81 mg by mouth daily  atorvastatin (LIPITOR) 20 MG tablet, Take 1 tablet (20 mg) by mouth daily  empagliflozin (JARDIANCE) 25 MG TABS tablet, Take by mouth daily  gabapentin (NEURONTIN) 300 MG capsule, Take 1 capsule (300 mg) by mouth 3 times daily for 3 days  glimepiride (AMARYL) 2 MG tablet, Take by mouth daily (with breakfast)  ibuprofen (ADVIL/MOTRIN) 600 MG tablet, Take 1 tablet (600 mg) by mouth every 6 hours Scheduled x48hrs, then 600mg every 6hrs as needed, pain. Alternate with tylenol  levothyroxine (SYNTHROID/LEVOTHROID) 50 MCG tablet, Take by mouth daily  metFORMIN (GLUCOPHAGE) 500 MG tablet, Take 2 tablets (1,000 mg) by mouth 2 times daily (with meals)  oxyCODONE (ROXICODONE) 5 MG tablet, Take 1-2 tablets (5-10 mg) by mouth every 4 hours as needed for moderate to severe pain  pioglitazone (ACTOS) 45 MG tablet, Take 1  "tablet (45 mg) by mouth daily    No current facility-administered medications on file prior to visit.        Allergies: Patient has no known allergies.    Immunization History   Administered Date(s) Administered     Influenza (High Dose) 3 valent vaccine 10/18/2016     Influenza Quad, Recombinant, p-free (RIV4) 12/31/2018, 02/18/2020     Pneumo Conj 13-V (2010&after) 12/05/2016     Pneumococcal 23 valent 12/31/2018     Tdap (Adacel,boostrix) 04/02/2019      OBJECTIVE:     /68 (BP Location: Left arm, Patient Position: Chair, Cuff Size: Adult Large)   Pulse 84   Temp 97.9  F (36.6  C) (Oral)   Resp 20   Ht 1.892 m (6' 2.5\")   Wt 113.6 kg (250 lb 6.4 oz)   BMI 31.72 kg/m     Skin: abdominal skin fold inguinal reveals a large patch of dark erythema -macular with some satellite lesions in groin crease    ASSESSMENT: /PLAN:   (B37.9) Candida infection  (primary encounter diagnosis)  Comment: apparent candida, diabetes controlled OK per pt -discussed that high sugars can predispose to this  Plan: nystatin (MYCOSTATIN) 149657 UNIT/GM external         cream         recommend antifungal cream, spray or powder OTC, use powder  to wick away moisture, avoid prolonged moisture exposure       "

## 2020-06-03 NOTE — NURSING NOTE
Gaetano Sheriff is here for a red spot on his lower abdomen for the past few weeks.    Questioned patient about current smoking habits.  Pt. has never smoked.  PULSE regular  My Chart: declines  CLASSIFICATION OF OVERWEIGHT AND OBESITY BY BMI                        Obesity Class           BMI(kg/m2)  Underweight                                    < 18.5  Normal                                         18.5-24.9  Overweight                                     25.0-29.9  OBESITY                     I                  30.0-34.9                             II                 35.0-39.9  EXTREME OBESITY             III                >40                            Patient's  BMI Body mass index is 31.72 kg/m .  http://hin.nhlbi.nih.gov/menuplanner/menu.cgi  Pre-visit planning  Immunizations - up to date  Colonoscopy -   Mammogram -   Asthma -   PHQ9 -    SHERWIN-7 -

## 2020-06-23 ENCOUNTER — OFFICE VISIT (OUTPATIENT)
Dept: FAMILY MEDICINE | Facility: CLINIC | Age: 75
End: 2020-06-23

## 2020-06-23 VITALS
WEIGHT: 251.6 LBS | HEIGHT: 75 IN | DIASTOLIC BLOOD PRESSURE: 72 MMHG | TEMPERATURE: 97 F | HEART RATE: 80 BPM | RESPIRATION RATE: 20 BRPM | BODY MASS INDEX: 31.28 KG/M2 | SYSTOLIC BLOOD PRESSURE: 128 MMHG

## 2020-06-23 DIAGNOSIS — R21 RASH AND NONSPECIFIC SKIN ERUPTION: Primary | ICD-10-CM

## 2020-06-23 PROCEDURE — 99213 OFFICE O/P EST LOW 20 MIN: CPT | Performed by: FAMILY MEDICINE

## 2020-06-23 RX ORDER — TRIAMCINOLONE ACETONIDE 1 MG/G
CREAM TOPICAL 2 TIMES DAILY
Qty: 45 G | Refills: 1 | Status: SHIPPED | OUTPATIENT
Start: 2020-06-23 | End: 2020-08-31

## 2020-06-23 ASSESSMENT — MIFFLIN-ST. JEOR: SCORE: 1953.94

## 2020-06-23 NOTE — PROGRESS NOTES
SUBJECTIVE: Gaetano Sheriff is a 74 year old male who presents for rash-was seen here 6/3/20 as new pt with inguinal fold rash-felt to be candidal and treated with Nystatin-has not responded.  Pt has now noted skin lesions on left forearm , abdomen, and low back.  No itching or pain.      Patient Active Problem List   Diagnosis     Acquired hypothyroidism     Acute diverticulitis     Atherosclerotic heart disease     Essential hypertension     Mixed hyperlipidemia     BMI 30.0-30.9,adult     Presence of cardiac pacemaker     Uncontrolled type 2 diabetes mellitus with hyperglycemia (H)     Type 2 diabetes mellitus without complication, without long-term current use of insulin (H)     Health Care Home     ACP (advance care planning)     Past Medical History:   Diagnosis Date     Arrhythmia      Chronic infection      Diabetes (H)     type 2     High cholesterol      Hypertension      Pacemaker     Medtronic     Thyroid disease      Family History   Problem Relation Age of Onset     Diabetes Sister      Diabetes Brother      Coronary Artery Disease No family hx of      Cerebrovascular Disease No family hx of      Colon Cancer No family hx of      Prostate Cancer No family hx of      Social History     Socioeconomic History     Marital status:      Spouse name: Va     Number of children: Not on file     Years of education: Not on file     Highest education level: Not on file   Occupational History     Not on file   Social Needs     Financial resource strain: Not on file     Food insecurity     Worry: Not on file     Inability: Not on file     Transportation needs     Medical: Not on file     Non-medical: Not on file   Tobacco Use     Smoking status: Never Smoker     Smokeless tobacco: Never Used   Substance and Sexual Activity     Alcohol use: Yes     Comment: Occas     Drug use: Never     Sexual activity: Not on file   Lifestyle     Physical activity     Days per week: Not on file     Minutes per session: Not  on file     Stress: Not on file   Relationships     Social connections     Talks on phone: Not on file     Gets together: Not on file     Attends Catholic service: Not on file     Active member of club or organization: Not on file     Attends meetings of clubs or organizations: Not on file     Relationship status: Not on file     Intimate partner violence     Fear of current or ex partner: Not on file     Emotionally abused: Not on file     Physically abused: Not on file     Forced sexual activity: Not on file   Other Topics Concern     Not on file   Social History Narrative     Not on file     Past Surgical History:   Procedure Laterality Date     IMPLANT PACEMAKER      Also Replaced battery x 1     OPEN REDUCTION INTERNAL FIXATION ANKLE Left 4/6/2020    Procedure: Open reduction internal fixation left bimalleolar ankle fracture;  Surgeon: Kaz Apple MD;  Location: [facility]     ORTHOPEDIC SURGERY Left     fracture repaired     aspirin 81 MG EC tablet, Take 81 mg by mouth daily  atorvastatin (LIPITOR) 20 MG tablet, Take 1 tablet (20 mg) by mouth daily  empagliflozin (JARDIANCE) 25 MG TABS tablet, Take by mouth daily  glimepiride (AMARYL) 2 MG tablet, Take by mouth daily (with breakfast)  levothyroxine (SYNTHROID/LEVOTHROID) 50 MCG tablet, Take by mouth daily  metFORMIN (GLUCOPHAGE) 500 MG tablet, Take 2 tablets (1,000 mg) by mouth 2 times daily (with meals)  nystatin (MYCOSTATIN) 324830 UNIT/GM external cream, Apply topically 2 times daily  pioglitazone (ACTOS) 45 MG tablet, Take 1 tablet (45 mg) by mouth daily    No current facility-administered medications on file prior to visit.        Allergies: Patient has no known allergies.    Immunization History   Administered Date(s) Administered     Influenza (High Dose) 3 valent vaccine 10/18/2016     Influenza Quad, Recombinant, p-free (RIV4) 12/31/2018, 02/18/2020     Pneumo Conj 13-V (2010&after) 12/05/2016     Pneumococcal 23 valent 12/31/2018     Tdap  "(Adacel,boostrix) 04/02/2019    OBJECTIVE:     /72 (BP Location: Right arm, Patient Position: Chair, Cuff Size: Adult Large)   Pulse 80   Temp 97  F (36.1  C)   Resp 20   Ht 1.892 m (6' 2.5\")   Wt 114.1 kg (251 lb 9.6 oz)   BMI 31.87 kg/m     Skin: pt has macular erythema in left inguinal crease, no satellite lesions seen here, left forearm shows macular lesions with scale and mild erythema, no central clearing, low abd with solitary annular lesions with scale, simila lesions on low back    ASSESSMENT: /PLAN:   (R21) Rash and nonspecific skin eruption  (primary encounter diagnosis)  Comment: rash does not appear to be candidal as previously suspected, I wonder about granuloma annulare now  Plan: DERMATOLOGY REFERRAL, triamcinolone (KENALOG)         0.1 % external cream        Trial steroid cream , refer to derm      "

## 2020-07-30 ENCOUNTER — TELEPHONE (OUTPATIENT)
Dept: FAMILY MEDICINE | Facility: CLINIC | Age: 75
End: 2020-07-30

## 2020-07-30 DIAGNOSIS — E11.9 TYPE 2 DIABETES MELLITUS WITHOUT COMPLICATION, WITHOUT LONG-TERM CURRENT USE OF INSULIN (H): Primary | ICD-10-CM

## 2020-07-30 RX ORDER — PIOGLITAZONEHYDROCHLORIDE 45 MG/1
45 TABLET ORAL DAILY
Qty: 30 TABLET | Refills: 0 | Status: SHIPPED | OUTPATIENT
Start: 2020-07-30 | End: 2020-11-24

## 2020-07-30 RX ORDER — ATORVASTATIN CALCIUM 20 MG/1
20 TABLET, FILM COATED ORAL DAILY
Qty: 30 TABLET | Refills: 0 | Status: SHIPPED | OUTPATIENT
Start: 2020-07-30 | End: 2020-11-24

## 2020-07-30 RX ORDER — LEVOTHYROXINE SODIUM 50 UG/1
50 TABLET ORAL DAILY
Qty: 30 TABLET | Refills: 0 | Status: SHIPPED | OUTPATIENT
Start: 2020-07-30 | End: 2020-11-24

## 2020-07-30 RX ORDER — GLIMEPIRIDE 2 MG/1
2 TABLET ORAL
Qty: 30 TABLET | Refills: 0 | Status: SHIPPED | OUTPATIENT
Start: 2020-07-30 | End: 2020-11-24

## 2020-07-30 NOTE — TELEPHONE ENCOUNTER
Gaetano established care with Dr. Garnica on 4/2/20. He is requesting refills at this time since he has run out. His A1C was done on 4/2/20 with a value of 8.9.    I have all his meds pending for 90 days, 0 refills. If you would like to give him additional refills could you change it?    Thanks, Jennifer        Pending Prescriptions:                       Disp   Refills    pioglitazone (ACTOS) 45 MG tablet         90 tab*0            Sig: Take 1 tablet (45 mg) by mouth daily    metFORMIN (GLUCOPHAGE) 500 MG tablet      360 ta*0            Sig: Take 2 tablets (1,000 mg) by mouth 2 times daily           (with meals)    levothyroxine (SYNTHROID/LEVOTHROID) 50 M*90 tab*0            Sig: Take 1 tablet (50 mcg) by mouth daily    glimepiride (AMARYL) 2 MG tablet          90 tab*0            Sig: Take 1 tablet (2 mg) by mouth daily (with           breakfast)    empagliflozin (JARDIANCE) 25 MG TABS tabl*90 tab*0            Sig: Take 1 tablet (25 mg) by mouth daily    atorvastatin (LIPITOR) 20 MG tablet       90 tab*0            Sig: Take 1 tablet (20 mg) by mouth daily

## 2020-07-30 NOTE — TELEPHONE ENCOUNTER
I will ok 30 days of each, please let him know we need to recheck as diabetes not controlled 4/20 and also after surgery

## 2020-08-05 DIAGNOSIS — I49.5 SICK SINUS SYNDROME (H): Primary | ICD-10-CM

## 2020-08-13 ENCOUNTER — DOCUMENTATION ONLY (OUTPATIENT)
Dept: CARDIOLOGY | Facility: CLINIC | Age: 75
End: 2020-08-13

## 2020-08-18 ENCOUNTER — TELEPHONE (OUTPATIENT)
Dept: CARDIOLOGY | Facility: CLINIC | Age: 75
End: 2020-08-18

## 2020-08-19 ENCOUNTER — ANCILLARY PROCEDURE (OUTPATIENT)
Dept: CARDIOLOGY | Facility: CLINIC | Age: 75
End: 2020-08-19
Attending: INTERNAL MEDICINE
Payer: MEDICARE

## 2020-08-19 ENCOUNTER — OFFICE VISIT (OUTPATIENT)
Dept: CARDIOLOGY | Facility: CLINIC | Age: 75
End: 2020-08-19
Payer: MEDICARE

## 2020-08-19 VITALS
HEIGHT: 75 IN | WEIGHT: 257.3 LBS | TEMPERATURE: 96.2 F | SYSTOLIC BLOOD PRESSURE: 145 MMHG | HEART RATE: 75 BPM | DIASTOLIC BLOOD PRESSURE: 76 MMHG | BODY MASS INDEX: 31.99 KG/M2

## 2020-08-19 DIAGNOSIS — I49.5 SICK SINUS SYNDROME (H): ICD-10-CM

## 2020-08-19 DIAGNOSIS — I49.5 SICK SINUS SYNDROME (H): Primary | ICD-10-CM

## 2020-08-19 PROCEDURE — 93280 PM DEVICE PROGR EVAL DUAL: CPT | Performed by: INTERNAL MEDICINE

## 2020-08-19 PROCEDURE — 99203 OFFICE O/P NEW LOW 30 MIN: CPT | Mod: 25 | Performed by: INTERNAL MEDICINE

## 2020-08-19 SDOH — HEALTH STABILITY: MENTAL HEALTH: HOW MANY STANDARD DRINKS CONTAINING ALCOHOL DO YOU HAVE ON A TYPICAL DAY?: 1 OR 2

## 2020-08-19 SDOH — HEALTH STABILITY: MENTAL HEALTH: HOW OFTEN DO YOU HAVE 6 OR MORE DRINKS ON ONE OCCASION?: WEEKLY

## 2020-08-19 ASSESSMENT — MIFFLIN-ST. JEOR: SCORE: 1979.8

## 2020-08-19 NOTE — PROGRESS NOTES
"Service Date: 2020      HISTORY OF PRESENT ILLNESS:  Thank you for allowing me to participate in the care of your very delightful patient.  As you know, Mr. Riggins is a 74-year-old gentleman with 2 isolated episodes of syncope preceded by a warm sensation.  One of which occurred while he was driving back in the late .  Extensive evaluation was done at that time including nuclear stress test and event monitor were unrevealing.  A year or 2 later, he had a similar episode at that time.  He was recommended to have a pacemaker.  Ever since then, he has not had any more episodes.        The patient has recently moved back from [place] to be back home and would like us to follow his pacemaker, which was replaced several years ago.        The pacemaker was checked today and shows appropriate function.  He is hardly pacing in the atrium and ventricle less than 1% of the time.  His blood pressure is a bit high today, but at home it is usually in the 120s.      Mr. Riggins is otherwise doing quite well.  He denies any shortness of breath, orthopnea, PND, palpitations or syncope.      It is difficult to know whether the episode of syncope was severe bradyarrhythmia with prodromal symptom of feeling \"warm all over\" suggestive of vasovagal phenomena.  I am hoping that mostly because of cardioinhibition, therefore, the pacemaker was helpful.  I am glad that the patient did not have any more episodes of syncope and we will continue to monitor that.        The patient otherwise is normotensive and in good health overall, especially from a cardiac perspective.  He can continue to follow in the [facility] and see us every other year or so.        cc:      Lm Garnica MD    [facility] [address]   [place] [address]         CHANEL JAQUEZ MD             D: 2020   T: 2020   MT: DARI      Name:     ANDREW RIGGINS   MRN:      [id number]        Account:      [id number]   :   "    1945           Service Date: 08/19/2020      Document: [id number]

## 2020-08-19 NOTE — PROGRESS NOTES
HPI and Plan:   See dictation  [id number]  No orders of the defined types were placed in this encounter.      No orders of the defined types were placed in this encounter.      There are no discontinued medications.      Encounter Diagnosis   Name Primary?     Sick sinus syndrome (H) Yes       CURRENT MEDICATIONS:  Current Outpatient Medications   Medication Sig Dispense Refill     aspirin 81 MG EC tablet Take 81 mg by mouth daily       atorvastatin (LIPITOR) 20 MG tablet Take 1 tablet (20 mg) by mouth daily 30 tablet 0     empagliflozin (JARDIANCE) 25 MG TABS tablet Take 1 tablet (25 mg) by mouth daily 30 tablet 0     glimepiride (AMARYL) 2 MG tablet Take 1 tablet (2 mg) by mouth daily (with breakfast) 30 tablet 0     levothyroxine (SYNTHROID/LEVOTHROID) 50 MCG tablet Take 1 tablet (50 mcg) by mouth daily 30 tablet 0     metFORMIN (GLUCOPHAGE) 500 MG tablet Take 2 tablets (1,000 mg) by mouth 2 times daily (with meals) 120 tablet 0     nystatin (MYCOSTATIN) 194866 UNIT/GM external cream Apply topically 2 times daily 30 g 1     pioglitazone (ACTOS) 45 MG tablet Take 1 tablet (45 mg) by mouth daily 30 tablet 0     triamcinolone (KENALOG) 0.1 % external cream Apply topically 2 times daily 45 g 1       ALLERGIES   No Known Allergies    PAST MEDICAL HISTORY:  Past Medical History:   Diagnosis Date     Acquired hypothyroidism      Arrhythmia      Chronic infection      Diabetes mellitus (H)     type 2     Hyperlipidemia      Hypertension      Syncope 2001    PPM placed       PAST SURGICAL HISTORY:  Past Surgical History:   Procedure Laterality Date     IMPLANT PACEMAKER  2001    Placed a [place] heart [place]     OPEN REDUCTION INTERNAL FIXATION ANKLE Left 4/6/2020    Procedure: Open reduction internal fixation left bimalleolar ankle fracture;  Surgeon: Kaz Apple MD;  Location: [facility]     ORTHOPEDIC SURGERY Left     fracture repaired     REPLACE PACEMAKER GENERATOR  2010       FAMILY HISTORY:  Family History    Problem Relation Age of Onset     Diabetes Father      Diabetes Sister      Diabetes Brother      Coronary Artery Disease No family hx of      Cerebrovascular Disease No family hx of      Colon Cancer No family hx of      Prostate Cancer No family hx of        SOCIAL HISTORY:  Social History     Socioeconomic History     Marital status:      Spouse name: Va     Number of children: None     Years of education: None     Highest education level: None   Occupational History     None   Social Needs     Financial resource strain: None     Food insecurity     Worry: None     Inability: None     Transportation needs     Medical: None     Non-medical: None   Tobacco Use     Smoking status: Never Smoker     Smokeless tobacco: Never Used   Substance and Sexual Activity     Alcohol use: Yes     Drinks per session: 1 or 2     Binge frequency: Weekly     Comment: Occas     Drug use: Never     Sexual activity: None   Lifestyle     Physical activity     Days per week: None     Minutes per session: None     Stress: None   Relationships     Social connections     Talks on phone: None     Gets together: None     Attends Adventism service: None     Active member of club or organization: None     Attends meetings of clubs or organizations: None     Relationship status: None     Intimate partner violence     Fear of current or ex partner: None     Emotionally abused: None     Physically abused: None     Forced sexual activity: None   Other Topics Concern     Parent/sibling w/ CABG, MI or angioplasty before 65F 55M? Not Asked   Social History Narrative     None       Review of Systems:  Skin:  Negative       Eyes:  Positive for glasses;redness    ENT:  Negative      Respiratory:  Negative       Cardiovascular:  Negative;palpitations;chest pain;syncope or near-syncope;cyanosis;lightheadedness;dizziness;fatigue Positive for;edema    Gastroenterology: Negative      Genitourinary:  Negative      Musculoskeletal:  Positive for joint  "swelling L ankle, recent surgery after fall  Neurologic:  Negative      Psychiatric:  Negative      Heme/Lymph/Imm:  Negative      Endocrine:  Positive for diabetes;thyroid disorder      Physical Exam:  Vitals: BP (!) 145/76   Pulse 75   Temp 96.2  F (35.7  C)   Ht 1.892 m (6' 2.5\")   Wt 116.7 kg (257 lb 4.8 oz)   BMI 32.59 kg/m      Constitutional:  cooperative, alert and oriented, well developed, well nourished, in no acute distress appears younger than stated age;obese      Skin:  warm and dry to the touch, no apparent skin lesions or masses noted          Head:  normocephalic, no masses or lesions        Eyes:  pupils equal and round, conjunctivae and lids unremarkable, sclera white, no xanthalasma, EOMS intact, no nystagmus        Lymph:No Cervical lymphadenopathy present     ENT:  no pallor or cyanosis, dentition good        Neck:           Respiratory:  normal breath sounds, clear to auscultation, normal A-P diameter, normal symmetry, normal respiratory excursion, no use of accessory muscles         Cardiac: regular rhythm, normal S1/S2, no S3 or S4, apical impulse not displaced, no murmurs, gallops or rubs                                                         GI:  abdomen soft, non-tender, BS normoactive, no mass, no HSM, no bruits        Extremities and Muscular Skeletal:  no deformities, clubbing, cyanosis, erythema observed              Neurological:  no gross motor deficits        Psych:  Alert and Oriented x 3        CC  No referring provider defined for this encounter.              "

## 2020-08-19 NOTE — LETTER
"8/19/2020      Lm Garnica MD  [address], [address]  [facility] [address]      RE: Gaetano Sheriff       Dear Colleague,    I had the pleasure of seeing Gaetano Sheriff in the [facility].    Service Date: 08/19/2020      HISTORY OF PRESENT ILLNESS:  Thank you for allowing me to participate in the care of your very delightful patient.  As you know, Mr. Sheriff is a 74-year-old gentleman with 2 isolated episodes of syncope preceded by a warm sensation.  One of which occurred while he was driving back in the late 1990s.  Extensive evaluation was done at that time including nuclear stress test and event monitor were unrevealing.  A year or 2 later, he had a similar episode at that time.  He was recommended to have a pacemaker.  Ever since then, he has not had any more episodes.        The patient has recently moved back from [place] to be back home and would like us to follow his pacemaker, which was replaced several years ago.        The pacemaker was checked today and shows appropriate function.  He is hardly pacing in the atrium and ventricle less than 1% of the time.  His blood pressure is a bit high today, but at home it is usually in the 120s.      Mr. Sheriff is otherwise doing quite well.  He denies any shortness of breath, orthopnea, PND, palpitations or syncope.      It is difficult to know whether the episode of syncope was severe bradyarrhythmia with prodromal symptom of feeling \"warm all over\" suggestive of vasovagal phenomena.  I am hoping that mostly because of cardioinhibition, therefore, the pacemaker was helpful.  I am glad that the patient did not have any more episodes of syncope and we will continue to monitor that.        The patient otherwise is normotensive and in good health overall, especially from a cardiac perspective.  He can continue to follow in the [facility] and see us every other year or so.        cc:      Lm Garnica MD    [facility]    [address] [facility]   [place] [address]         CHANEL JAQUEZ MD             D: 2020   T: 2020   MT: DARI      Name:     ANDREW RIGGINS   MRN:      [id number]        Account:      [id number]   :      1945           Service Date: 2020      Document: [id number]           Outpatient Encounter Medications as of 2020   Medication Sig Dispense Refill     aspirin 81 MG EC tablet Take 81 mg by mouth daily       atorvastatin (LIPITOR) 20 MG tablet Take 1 tablet (20 mg) by mouth daily 30 tablet 0     empagliflozin (JARDIANCE) 25 MG TABS tablet Take 1 tablet (25 mg) by mouth daily 30 tablet 0     glimepiride (AMARYL) 2 MG tablet Take 1 tablet (2 mg) by mouth daily (with breakfast) 30 tablet 0     levothyroxine (SYNTHROID/LEVOTHROID) 50 MCG tablet Take 1 tablet (50 mcg) by mouth daily 30 tablet 0     metFORMIN (GLUCOPHAGE) 500 MG tablet Take 2 tablets (1,000 mg) by mouth 2 times daily (with meals) 120 tablet 0     nystatin (MYCOSTATIN) 540195 UNIT/GM external cream Apply topically 2 times daily 30 g 1     pioglitazone (ACTOS) 45 MG tablet Take 1 tablet (45 mg) by mouth daily 30 tablet 0     triamcinolone (KENALOG) 0.1 % external cream Apply topically 2 times daily 45 g 1     No facility-administered encounter medications on file as of 2020.        Again, thank you for allowing me to participate in the care of your patient.      Sincerely,    Chanel Kaplan MD     [facility]

## 2020-08-19 NOTE — LETTER
8/19/2020    Lm Garnica MD  [address], [address]  [facility] [address]    RE: Gaetano Sheriff       Dear Colleague,    I had the pleasure of seeing Gaetano Sheriff in the [facility].    HPI and Plan:   See dictation  [id number]  No orders of the defined types were placed in this encounter.      No orders of the defined types were placed in this encounter.      There are no discontinued medications.      Encounter Diagnosis   Name Primary?     Sick sinus syndrome (H) Yes       CURRENT MEDICATIONS:  Current Outpatient Medications   Medication Sig Dispense Refill     aspirin 81 MG EC tablet Take 81 mg by mouth daily       atorvastatin (LIPITOR) 20 MG tablet Take 1 tablet (20 mg) by mouth daily 30 tablet 0     empagliflozin (JARDIANCE) 25 MG TABS tablet Take 1 tablet (25 mg) by mouth daily 30 tablet 0     glimepiride (AMARYL) 2 MG tablet Take 1 tablet (2 mg) by mouth daily (with breakfast) 30 tablet 0     levothyroxine (SYNTHROID/LEVOTHROID) 50 MCG tablet Take 1 tablet (50 mcg) by mouth daily 30 tablet 0     metFORMIN (GLUCOPHAGE) 500 MG tablet Take 2 tablets (1,000 mg) by mouth 2 times daily (with meals) 120 tablet 0     nystatin (MYCOSTATIN) 770203 UNIT/GM external cream Apply topically 2 times daily 30 g 1     pioglitazone (ACTOS) 45 MG tablet Take 1 tablet (45 mg) by mouth daily 30 tablet 0     triamcinolone (KENALOG) 0.1 % external cream Apply topically 2 times daily 45 g 1       ALLERGIES   No Known Allergies    PAST MEDICAL HISTORY:  Past Medical History:   Diagnosis Date     Acquired hypothyroidism      Arrhythmia      Chronic infection      Diabetes mellitus (H)     type 2     Hyperlipidemia      Hypertension      Syncope 2001    PPM placed       PAST SURGICAL HISTORY:  Past Surgical History:   Procedure Laterality Date     IMPLANT PACEMAKER  2001    Placed a [place] [facility]     OPEN REDUCTION INTERNAL FIXATION ANKLE Left 4/6/2020    Procedure: Open reduction internal  fixation left bimalleolar ankle fracture;  Surgeon: Kaz Apple MD;  Location: [facility] OR     ORTHOPEDIC SURGERY Left     fracture repaired     REPLACE PACEMAKER GENERATOR  2010       FAMILY HISTORY:  Family History   Problem Relation Age of Onset     Diabetes Father      Diabetes Sister      Diabetes Brother      Coronary Artery Disease No family hx of      Cerebrovascular Disease No family hx of      Colon Cancer No family hx of      Prostate Cancer No family hx of        SOCIAL HISTORY:  Social History     Socioeconomic History     Marital status:      Spouse name: Va     Number of children: None     Years of education: None     Highest education level: None   Occupational History     None   Social Needs     Financial resource strain: None     Food insecurity     Worry: None     Inability: None     Transportation needs     Medical: None     Non-medical: None   Tobacco Use     Smoking status: Never Smoker     Smokeless tobacco: Never Used   Substance and Sexual Activity     Alcohol use: Yes     Drinks per session: 1 or 2     Binge frequency: Weekly     Comment: Occas     Drug use: Never     Sexual activity: None   Lifestyle     Physical activity     Days per week: None     Minutes per session: None     Stress: None   Relationships     Social connections     Talks on phone: None     Gets together: None     Attends [employer]ical service: None     Active member of club or organization: None     Attends meetings of clubs or organizations: None     Relationship status: None     Intimate partner violence     Fear of current or ex partner: None     Emotionally abused: None     Physically abused: None     Forced sexual activity: None   Other Topics Concern     Parent/sibling w/ CABG, MI or angioplasty before 65F 55M? Not Asked   Social History Narrative     None       Review of Systems:  Skin:  Negative       Eyes:  Positive for glasses;redness    ENT:  Negative      Respiratory:  Negative      "  Cardiovascular:  Negative;palpitations;chest pain;syncope or near-syncope;cyanosis;lightheadedness;dizziness;fatigue Positive for;edema    Gastroenterology: Negative      Genitourinary:  Negative      Musculoskeletal:  Positive for joint swelling L ankle, recent surgery after fall  Neurologic:  Negative      Psychiatric:  Negative      Heme/Lymph/Imm:  Negative      Endocrine:  Positive for diabetes;thyroid disorder      Physical Exam:  Vitals: BP (!) 145/76   Pulse 75   Temp 96.2  F (35.7  C)   Ht 1.892 m (6' 2.5\")   Wt 116.7 kg (257 lb 4.8 oz)   BMI 32.59 kg/m      Constitutional:  cooperative, alert and oriented, well developed, well nourished, in no acute distress appears younger than stated age;obese      Skin:  warm and dry to the touch, no apparent skin lesions or masses noted          Head:  normocephalic, no masses or lesions        Eyes:  pupils equal and round, conjunctivae and lids unremarkable, sclera white, no xanthalasma, EOMS intact, no nystagmus        Lymph:No Cervical lymphadenopathy present     ENT:  no pallor or cyanosis, dentition good        Neck:           Respiratory:  normal breath sounds, clear to auscultation, normal A-P diameter, normal symmetry, normal respiratory excursion, no use of accessory muscles         Cardiac: regular rhythm, normal S1/S2, no S3 or S4, apical impulse not displaced, no murmurs, gallops or rubs                                                         GI:  abdomen soft, non-tender, BS normoactive, no mass, no HSM, no bruits        Extremities and Muscular Skeletal:  no deformities, clubbing, cyanosis, erythema observed              Neurological:  no gross motor deficits        Psych:  Alert and Oriented x 3        CC  No referring provider defined for this encounter.                Thank you for allowing me to participate in the care of your patient.      Sincerely,     Benny Kaplan MD     [facility]    cc:   No referring " provider defined for this encounter.

## 2020-08-20 LAB
MDC_IDC_LEAD_IMPLANT_DT: NORMAL
MDC_IDC_LEAD_IMPLANT_DT: NORMAL
MDC_IDC_LEAD_LOCATION: NORMAL
MDC_IDC_LEAD_LOCATION: NORMAL
MDC_IDC_LEAD_LOCATION_DETAIL_1: NORMAL
MDC_IDC_LEAD_LOCATION_DETAIL_1: NORMAL
MDC_IDC_LEAD_MFG: NORMAL
MDC_IDC_LEAD_MFG: NORMAL
MDC_IDC_LEAD_MODEL: NORMAL
MDC_IDC_LEAD_MODEL: NORMAL
MDC_IDC_LEAD_POLARITY_TYPE: NORMAL
MDC_IDC_LEAD_POLARITY_TYPE: NORMAL
MDC_IDC_LEAD_SERIAL: NORMAL
MDC_IDC_LEAD_SERIAL: NORMAL
MDC_IDC_MSMT_BATTERY_DTM: NORMAL
MDC_IDC_MSMT_BATTERY_IMPEDANCE: 885 OHM
MDC_IDC_MSMT_BATTERY_REMAINING_LONGEVITY: 85 MO
MDC_IDC_MSMT_BATTERY_STATUS: NORMAL
MDC_IDC_MSMT_BATTERY_VOLTAGE: 2.78 V
MDC_IDC_MSMT_LEADCHNL_RA_IMPEDANCE_VALUE: 483 OHM
MDC_IDC_MSMT_LEADCHNL_RA_PACING_THRESHOLD_AMPLITUDE: 0.5 V
MDC_IDC_MSMT_LEADCHNL_RA_PACING_THRESHOLD_AMPLITUDE: 0.62 V
MDC_IDC_MSMT_LEADCHNL_RA_PACING_THRESHOLD_PULSEWIDTH: 0.4 MS
MDC_IDC_MSMT_LEADCHNL_RA_PACING_THRESHOLD_PULSEWIDTH: 0.4 MS
MDC_IDC_MSMT_LEADCHNL_RA_SENSING_INTR_AMPL: 2 MV
MDC_IDC_MSMT_LEADCHNL_RV_IMPEDANCE_VALUE: 628 OHM
MDC_IDC_MSMT_LEADCHNL_RV_PACING_THRESHOLD_AMPLITUDE: 0.5 V
MDC_IDC_MSMT_LEADCHNL_RV_PACING_THRESHOLD_AMPLITUDE: 0.62 V
MDC_IDC_MSMT_LEADCHNL_RV_PACING_THRESHOLD_PULSEWIDTH: 0.4 MS
MDC_IDC_MSMT_LEADCHNL_RV_PACING_THRESHOLD_PULSEWIDTH: 0.4 MS
MDC_IDC_MSMT_LEADCHNL_RV_SENSING_INTR_AMPL: 8 MV
MDC_IDC_PG_IMPLANT_DTM: NORMAL
MDC_IDC_PG_MFG: NORMAL
MDC_IDC_PG_MODEL: NORMAL
MDC_IDC_PG_SERIAL: NORMAL
MDC_IDC_PG_TYPE: NORMAL
MDC_IDC_SESS_CLINIC_NAME: NORMAL
MDC_IDC_SESS_DTM: NORMAL
MDC_IDC_SESS_TYPE: NORMAL
MDC_IDC_SET_BRADY_AT_MODE_SWITCH_MODE: NORMAL
MDC_IDC_SET_BRADY_AT_MODE_SWITCH_RATE: 175 {BEATS}/MIN
MDC_IDC_SET_BRADY_LOWRATE: 60 {BEATS}/MIN
MDC_IDC_SET_BRADY_MAX_SENSOR_RATE: 140 {BEATS}/MIN
MDC_IDC_SET_BRADY_MAX_TRACKING_RATE: 140 {BEATS}/MIN
MDC_IDC_SET_BRADY_MODE: NORMAL
MDC_IDC_SET_BRADY_PAV_DELAY_LOW: 150 MS
MDC_IDC_SET_BRADY_SAV_DELAY_LOW: 120 MS
MDC_IDC_SET_LEADCHNL_RA_PACING_AMPLITUDE: 1.5 V
MDC_IDC_SET_LEADCHNL_RA_PACING_CAPTURE_MODE: NORMAL
MDC_IDC_SET_LEADCHNL_RA_PACING_POLARITY: NORMAL
MDC_IDC_SET_LEADCHNL_RA_PACING_PULSEWIDTH: 0.4 MS
MDC_IDC_SET_LEADCHNL_RA_SENSING_POLARITY: NORMAL
MDC_IDC_SET_LEADCHNL_RA_SENSING_SENSITIVITY: 0.5 MV
MDC_IDC_SET_LEADCHNL_RV_PACING_AMPLITUDE: 2 V
MDC_IDC_SET_LEADCHNL_RV_PACING_CAPTURE_MODE: NORMAL
MDC_IDC_SET_LEADCHNL_RV_PACING_POLARITY: NORMAL
MDC_IDC_SET_LEADCHNL_RV_PACING_PULSEWIDTH: 0.4 MS
MDC_IDC_SET_LEADCHNL_RV_SENSING_POLARITY: NORMAL
MDC_IDC_SET_LEADCHNL_RV_SENSING_SENSITIVITY: 2.8 MV
MDC_IDC_SET_ZONE_DETECTION_INTERVAL: 333.33 MS
MDC_IDC_SET_ZONE_DETECTION_INTERVAL: 342.86 MS
MDC_IDC_SET_ZONE_TYPE: NORMAL
MDC_IDC_SET_ZONE_TYPE: NORMAL
MDC_IDC_STAT_AT_BURDEN_PERCENT: 0 %
MDC_IDC_STAT_AT_DTM_END: NORMAL
MDC_IDC_STAT_AT_DTM_START: NORMAL
MDC_IDC_STAT_AT_MODE_SW_COUNT: 4
MDC_IDC_STAT_BRADY_AP_VP_PERCENT: 0 %
MDC_IDC_STAT_BRADY_AP_VS_PERCENT: 0 %
MDC_IDC_STAT_BRADY_AS_VP_PERCENT: 0 %
MDC_IDC_STAT_BRADY_AS_VS_PERCENT: 99 %
MDC_IDC_STAT_BRADY_DTM_END: NORMAL
MDC_IDC_STAT_BRADY_DTM_START: NORMAL
MDC_IDC_STAT_BRADY_RA_PERCENT_PACED: NORMAL
MDC_IDC_STAT_BRADY_RV_PERCENT_PACED: NORMAL
MDC_IDC_STAT_EPISODE_RECENT_COUNT: 0
MDC_IDC_STAT_EPISODE_RECENT_COUNT: 0
MDC_IDC_STAT_EPISODE_RECENT_COUNT_DTM_END: NORMAL
MDC_IDC_STAT_EPISODE_RECENT_COUNT_DTM_END: NORMAL
MDC_IDC_STAT_EPISODE_RECENT_COUNT_DTM_START: NORMAL
MDC_IDC_STAT_EPISODE_RECENT_COUNT_DTM_START: NORMAL
MDC_IDC_STAT_EPISODE_TYPE: NORMAL
MDC_IDC_STAT_EPISODE_TYPE: NORMAL

## 2020-08-31 ENCOUNTER — OFFICE VISIT (OUTPATIENT)
Dept: FAMILY MEDICINE | Facility: CLINIC | Age: 75
End: 2020-08-31

## 2020-08-31 VITALS
RESPIRATION RATE: 20 BRPM | HEIGHT: 75 IN | SYSTOLIC BLOOD PRESSURE: 118 MMHG | TEMPERATURE: 97.8 F | WEIGHT: 255 LBS | OXYGEN SATURATION: 98 % | HEART RATE: 71 BPM | BODY MASS INDEX: 31.71 KG/M2 | DIASTOLIC BLOOD PRESSURE: 72 MMHG

## 2020-08-31 DIAGNOSIS — I10 ESSENTIAL HYPERTENSION: ICD-10-CM

## 2020-08-31 DIAGNOSIS — E78.2 MIXED HYPERLIPIDEMIA: ICD-10-CM

## 2020-08-31 DIAGNOSIS — I25.10 ATHEROSCLEROSIS OF CORONARY ARTERY OF NATIVE HEART WITHOUT ANGINA PECTORIS, UNSPECIFIED VESSEL OR LESION TYPE: ICD-10-CM

## 2020-08-31 DIAGNOSIS — E11.9 TYPE 2 DIABETES MELLITUS WITHOUT COMPLICATION, WITHOUT LONG-TERM CURRENT USE OF INSULIN (H): Primary | ICD-10-CM

## 2020-08-31 PROBLEM — M25.519 SHOULDER JOINT PAIN: Status: ACTIVE | Noted: 2017-08-02

## 2020-08-31 LAB
ALBUMIN SERPL-MCNC: 4.4 G/DL (ref 3.6–5.1)
ALBUMIN URINE MG/G CR: <30 MG/G CREATININE
ALBUMIN URINE MG/SPEC: 30
ALBUMIN/GLOB SERPL: 1.8 {RATIO} (ref 1–2.5)
ALP SERPL-CCNC: 69 U/L (ref 33–130)
ALT 1742-6: 9 U/L (ref 0–32)
AST 1920-8: 10 U/L (ref 0–35)
BILIRUB SERPL-MCNC: 1.2 MG/DL (ref 0.2–1.2)
BUN SERPL-MCNC: 27 MG/DL (ref 7–25)
BUN/CREATININE RATIO: 22.7 (ref 6–22)
CALCIUM SERPL-MCNC: 9.8 MG/DL (ref 8.6–10.3)
CHLORIDE SERPLBLD-SCNC: 105.3 MMOL/L (ref 98–110)
CHOLEST SERPL-MCNC: 146 MG/DL (ref 0–199)
CHOLEST/HDLC SERPL: 2 {RATIO} (ref 0–5)
CO2 SERPL-SCNC: 29.9 MMOL/L (ref 20–32)
CREAT SERPL-MCNC: 1.19 MG/DL (ref 0.7–1.18)
CREATININE URINE: 100
GFR SERPL CREATININE-BSD FRML MDRD: 59 ML/MIN/1.73M2
GLOBULIN, CALCULATED - QUEST: 2.5 (ref 1.9–3.7)
GLUCOSE SERPL-MCNC: 145 MG/DL (ref 60–99)
HBA1C MFR BLD: 8.4 % (ref 4–7)
HDLC SERPL-MCNC: 69 MG/DL (ref 40–150)
LDLC SERPL CALC-MCNC: 58 MG/DL (ref 0–130)
POTASSIUM SERPL-SCNC: 5.19 MMOL/L (ref 3.5–5.3)
PROT SERPL-MCNC: 6.9 G/DL (ref 6.1–8.1)
SODIUM SERPL-SCNC: 141.9 MMOL/L (ref 135–146)
TRIGL SERPL-MCNC: 96 MG/DL (ref 0–149)

## 2020-08-31 PROCEDURE — 80061 LIPID PANEL: CPT | Performed by: FAMILY MEDICINE

## 2020-08-31 PROCEDURE — 80053 COMPREHEN METABOLIC PANEL: CPT | Performed by: FAMILY MEDICINE

## 2020-08-31 PROCEDURE — 99214 OFFICE O/P EST MOD 30 MIN: CPT | Performed by: FAMILY MEDICINE

## 2020-08-31 PROCEDURE — 82043 UR ALBUMIN QUANTITATIVE: CPT | Performed by: FAMILY MEDICINE

## 2020-08-31 PROCEDURE — 36415 COLL VENOUS BLD VENIPUNCTURE: CPT | Performed by: FAMILY MEDICINE

## 2020-08-31 PROCEDURE — 83036 HEMOGLOBIN GLYCOSYLATED A1C: CPT | Performed by: FAMILY MEDICINE

## 2020-08-31 RX ORDER — TRIAMCINOLONE ACETONIDE 1 MG/G
CREAM TOPICAL
COMMUNITY
Start: 2020-06-23 | End: 2020-12-30

## 2020-08-31 RX ORDER — NITROGLYCERIN 0.4 MG/1
TABLET SUBLINGUAL
COMMUNITY

## 2020-08-31 ASSESSMENT — MIFFLIN-ST. JEOR: SCORE: 1969.36

## 2020-08-31 NOTE — PROGRESS NOTES
Subjective     Gaetano Sheriff is a 74 year old male who presents to clinic today for the following health issues:    HPI       Diabetes Follow-up    How often are you checking your blood sugar? One time daily  What time of day are you checking your blood sugars (select all that apply)?  Before meals 120-130  Have you had any blood sugars above 200?  No  Have you had any blood sugars below 70?  No    What symptoms do you notice when your blood sugar is low?  None    What concerns do you have today about your diabetes? None     Do you have any of these symptoms? (Select all that apply)  No numbness or tingling in feet.  No redness, sores or blisters on feet.  No complaints of excessive thirst.  No reports of blurry vision.  No significant changes to weight.    Have you had a diabetic eye exam in the last 12 months? No appt 12/11/20                Hyperlipidemia Follow-Up      Are you regularly taking any medication or supplement to lower your cholesterol?   Yes- statin    Are you having muscle aches or other side effects that you think could be caused by your cholesterol lowering medication?  No    Hypertension Follow-up      Do you check your blood pressure regularly outside of the clinic? Yes     Are you following a low salt diet? No    Are your blood pressures ever more than 140 on the top number (systolic) OR more   than 90 on the bottom number (diastolic), for example 140/90? No    BP Readings from Last 2 Encounters:   08/31/20 118/72   08/19/20 (!) 145/76     Hemoglobin A1C (%)   Date Value   04/02/2020 8.9 (A)         How many servings of fruits and vegetables do you eat daily?  2-3    On average, how many sweetened beverages do you drink each day (Examples: soda, juice, sweet tea, etc.  Do NOT count diet or artificially sweetened beverages)?   1    How many days per week do you exercise enough to make your heart beat faster? 3 or less    How many minutes a day do you exercise enough to make your heart beat  "faster? 20 - 29    How many days per week do you miss taking your medication? 0        Review of Systems   Constitutional, HEENT, cardiovascular, pulmonary, gi and gu systems are negative, except as otherwise noted.      Objective    /72 (BP Location: Right arm, Patient Position: Sitting, Cuff Size: Adult Large)   Pulse 71   Temp 97.8  F (36.6  C) (Oral)   Resp 20   Ht 1.892 m (6' 2.5\")   Wt 115.7 kg (255 lb)   SpO2 98%   BMI 32.30 kg/m    Body mass index is 32.3 kg/m .  Physical Exam   GENERAL: healthy, alert and no distress  EYES: Eyes grossly normal to inspection, PERRL and conjunctivae and sclerae normal  HENT: ear canals and TM's normal, nose and mouth without ulcers or lesions  NECK: no adenopathy, no asymmetry, masses, or scars and thyroid normal to palpation  RESP: lungs clear to auscultation - no rales, rhonchi or wheezes  CV: regular rate and rhythm, normal S1 S2, no S3 or S4, no murmur, click or rub, no peripheral edema and peripheral pulses strong  ABDOMEN: soft, nontender, no hepatosplenomegaly, no masses and bowel sounds normal  MS: no gross musculoskeletal defects noted, no edema  SKIN: no suspicious lesions or rashes  NEURO: Normal strength and tone, mentation intact and speech normal  PSYCH: mentation appears normal, affect normal/bright  Diabetic foot exam: normal DP and PT pulses, no trophic changes or ulcerative lesions and normal sensory exam    Results for orders placed or performed in visit on 08/31/20 (from the past 24 hour(s))   Hemoglobin A1c (BFP)   Result Value Ref Range    Hemoglobin A1C 8.4 (A) 4.0 - 7.0 %           Assessment & Plan     Type 2 diabetes mellitus without complication, without long-term current use of insulin (H)  Improved, has been taking 2 mg glimepiride -could increase to 4 mg but he prefers to work on exercise first after recovering from ankle surgery    Mixed hyperlipidemia  Control uncertain, continue current medications at current doses pending " "labs    Essential hypertension  Well controlled, continue current medications at current doses        BMI:   Estimated body mass index is 32.3 kg/m  as calculated from the following:    Height as of this encounter: 1.892 m (6' 2.5\").    Weight as of this encounter: 115.7 kg (255 lb).   Weight management plan: Discussed healthy diet and exercise guidelines        FUTURE APPOINTMENTS:       - Follow-up visit in 6 mo  Work on weight loss  Regular exercise    No follow-ups on file.    Lm Garnica MD  [facility]      "

## 2020-08-31 NOTE — LETTER
September 1, 2020      Gaetano Sheriff  [address]   [place] [address]        Dear ,    We are writing to inform you of your test results.    Your test results fall within the expected range(s) or remain unchanged from previous results.  Please continue with current treatment plan. Liver , kidney, urine tests look fine.     Resulted Orders   Hemoglobin A1c (BFP)   Result Value Ref Range    Hemoglobin A1C 8.4 (A) 4.0 - 7.0 %   Lipid Panel (BFP)   Result Value Ref Range    Cholesterol 146 0 - 199 mg/dL    Triglycerides 96 0 - 149 mg/dL    HDL Cholesterol 69 40 - 150 mg/dL    LDL Cholesterol Direct 58 0 - 130 mg/dL    Cholesterol/HDL Ratio 2 0 - 5   Comprehensive Metobolic Panel (BFP)   Result Value Ref Range    Carbon Dioxide 29.9 20 - 32 mmol/L    Creatinine 1.19 (A) 0.70 - 1.18 mg/dL    Glucose 145 (A) 60 - 99 mg/dL    Sodium 141.9 135 - 146 mmol/L    Potassium 5.19 3.5 - 5.3 mmol/L    Chloride 105.3 98 - 110 mmol/L    Protein Total 6.9 6.1 - 8.1 g/dL    Albumin 4.4 3.6 - 5.1 g/dL    Alkaline Phosphatase 69 33 - 130 U/L    ALT 9 0 - 32 U/L    AST 10 0 - 35 U/L    Bilirubin Total 1.2 0.2 - 1.2 mg/dL    Urea Nitrogen 27 (A) 7 - 25 mg/dL    Calcium 9.8 8.6 - 10.3 mg/dL    BUN/Creatinine Ratio 22.7 (A) 6 - 22    Globulin Calculated 2.5 1.9 - 3.7    A/G Ratio 1.8 1 - 2.5    GFR Estimate 59 (A) >60 ml/min/1.73m2   Albumin Random Urine Quantitative with Creat Ratio   Result Value Ref Range    Albumin Urine mg/spec 30 30    Albumin Urine mg/g Cr <30 30 MG/G Creatinine    Creatinine Urine 100 300       If you have any questions or concerns, please call the clinic at the number listed above.       Sincerely,        Lm Garnica MD

## 2020-08-31 NOTE — NURSING NOTE
Chief Complaint   Patient presents with     Diabetes     diabetic check and review, fasting     Pre-visit Screening:  Immunizations:  up to date  Colonoscopy:  is due but patient wants to try the cologard testing-will put in an order for this to be sent to patient   Mammogram: NA  Asthma Action Test/Plan:  NA  PHQ9:  PHQ-2 done today   GAD7:  No concerns  Questioned patient about current smoking habits Pt. has never smoked.  Ok to leave detailed message on voice mail for today's visit only Yes, phone # [phone]

## 2020-11-13 ENCOUNTER — OFFICE VISIT (OUTPATIENT)
Dept: CARDIOLOGY | Facility: CLINIC | Age: 75
End: 2020-11-13
Payer: MEDICARE

## 2020-11-13 VITALS
BODY MASS INDEX: 33.43 KG/M2 | SYSTOLIC BLOOD PRESSURE: 158 MMHG | HEART RATE: 82 BPM | OXYGEN SATURATION: 98 % | DIASTOLIC BLOOD PRESSURE: 81 MMHG | HEIGHT: 74 IN | WEIGHT: 260.5 LBS

## 2020-11-13 DIAGNOSIS — E78.5 HYPERLIPIDEMIA LDL GOAL <70: Primary | ICD-10-CM

## 2020-11-13 PROCEDURE — 99204 OFFICE O/P NEW MOD 45 MIN: CPT | Performed by: INTERNAL MEDICINE

## 2020-11-13 ASSESSMENT — MIFFLIN-ST. JEOR: SCORE: 1986.37

## 2020-11-13 NOTE — PROGRESS NOTES
CARDIOLOGY CONSULT    REASON FOR CONSULT:  Establish cardiology care,   PRIMARY CARE PHYSICIAN:  mL Garnica    HISTORY OF PRESENT ILLNESS:  Mr. Sheriff is a pleasant 73 y/o gentleman with PMH significant for recurrent syncope s/p pacemaker, hypertension, hyperlipidemia and diabetes who presents to clinic today to establish cardiology care.  Gaetano is a long time resident of [place], however, in recent years has lived in [place].  He and his wife recently returned to [place].  Gaetano reports a hx of recurrent syncope.  The first time occurred in the last 90's while driving down the highway.  The second episode occurred several years later while he was pulling into his long driveway.  Extensive EP evaluation and EP study was performed and unrevealing and the decision was made to proceed with pacemaker given the severity of his syncopal episodes.    Gaetano also reports a family hx of CAD, valve issues etc.  Both his older and younger brother have cardiovascular disease.  His father  of cardiovascular disease in his 70's.  Given his family hx, Gaetano wants to remain vigilant with regular cardiac follow up.  He has a hx of diabetes and is on oral tx for that.  His HgbA1C was last 8.4.  He is currently working with his primary MD.  He has seen an endocrinologist in the past.  He admits sweets and goodies are a bit of an issue at home.  He notes that he is physically active out in his yard, etc but does not get regular physical activity for the purpose of exercise.  His wife enjoys walking, an activity that he does not enjoy as much but will do from time to time.  He denies any exertional chest pain, chest discomfort or shortness of breath with activities.  He denies any light-headedness, dizziness or syncope.  He is without cardiovascular complaints.      PAST MEDICAL HISTORY:  1.  Recurrent syncope s/p pacemaker  2.  Diabetes type II  3.  Hypertension  4.  Hyperlipidemia  5.   Hypothyroidism      MEDICATIONS:  Current Outpatient Medications   Medication     aspirin 81 MG EC tablet     atorvastatin (LIPITOR) 20 MG tablet     empagliflozin (JARDIANCE) 25 MG TABS tablet     glimepiride (AMARYL) 2 MG tablet     levothyroxine (SYNTHROID/LEVOTHROID) 50 MCG tablet     metFORMIN (GLUCOPHAGE) 500 MG tablet     pioglitazone (ACTOS) 45 MG tablet     triamcinolone (KENALOG) 0.1 % external cream     nitroGLYcerin (NITROSTAT) 0.4 MG sublingual tablet     No current facility-administered medications for this visit.        ALLERGIES:  No Known Allergies    SOCIAL HISTORY:  He is a nonsmoker.  Occasional ETOH.      FAMILY HISTORY:  I have reviewed this patient's family history and updated it with pertinent information if needed.   Family History   Problem Relation Age of Onset     Diabetes Father      Diabetes Sister      Diabetes Brother      Coronary Artery Disease No family hx of      Cerebrovascular Disease No family hx of      Colon Cancer No family hx of      Prostate Cancer No family hx of        REVIEW OF SYSTEMS:  A complete ROS was obtained and the pertinent positives are outlined in the history of present illness above.  The remainder of systems is negative.      PHYSICAL EXAM:                     Vital Signs with Ranges     0 lbs 0 oz    Constitutional: awake, alert, no distress  Eyes: PERRL, sclera nonicteric  ENT: trachea midline  Respiratory: CTAB  Cardiovascular: RRR no m/r/g, no JVD  GI: nondistended, nontender, bowel sounds present  Lymph/Hematologic: no lymphadenopathy  Skin: dry, no rash  Musculoskeletal: good muscle tone, no edema bilaterally  Neurologic: no focal deficits  Neuropsychiatric: appropriate affact    DATA:  Labs:   Reviewed in Epic      Echocardiogram 11/2019    Mild concentric LV hypertrophy. LV wall motion & EF is normal. LVEF is   60-65%.    RV poorly visualized, systolic function appears normal.    LA & RA normal size.    Tricuspid AV. No AS/ AR.    Trace mitral  regurgitation    RVSP is mildly elevated. RVSP 29 mmHg    Ascending aorta normal.        ASSESSMENT:  1.  Recurrent syncope s/p pacemaker:  Follows in device clinic with recent interrogation demonstrating normal function  2.  Hypertension:  Elevated today; previously at goal  3.  Hyperlipidemia:  AT goal on atrovastatin  4.  Diabetes type II:  On oral meds, last HGbA1C was 8.4.  Working with primary MD to optimize diabetes management    RECOMMENDATIONS:  1.  Reviewed the importance of a heart healthy diet and regular exercise for weight loss and to optimize diabetes management.  He is on good medical therapy with aspirin and atorvastatin which he should continue.  2.  Follow up with device clinic per usual routine  3.  Plan for follow up in 12 months or before than as needed.      Mora Arriaga MD  Cardiology - [facility] [email or web address]] [facility]  November 13, 2020

## 2020-11-13 NOTE — LETTER
2020    Lm Garnica MD  [address], [address]  [facility] [address]    RE: Gaetano Sheriff       Dear Colleague,    I had the pleasure of seeing Gaetano Sheriff in the [facility].    CARDIOLOGY CONSULT    REASON FOR CONSULT:  Establish cardiology care,   PRIMARY CARE PHYSICIAN:  Lm Garnica    HISTORY OF PRESENT ILLNESS:  Mr. Sheriff is a pleasant 75 y/o gentleman with PMH significant for recurrent syncope s/p pacemaker, hypertension, hyperlipidemia and diabetes who presents to clinic today to establish cardiology care.  Gaetano is a long time resident of [place], however, in recent years has lived in [place].  He and his wife recently returned to [place].  Gaetano reports a hx of recurrent syncope.  The first time occurred in the last 90's while driving down the highway.  The second episode occurred several years later while he was pulling into his long driveway.  Extensive EP evaluation and EP study was performed and unrevealing and the decision was made to proceed with pacemaker given the severity of his syncopal episodes.    Gaetano also reports a family hx of CAD, valve issues etc.  Both his older and younger brother have cardiovascular disease.  His father  of cardiovascular disease in his 70's.  Given his family hx, Gaetano wants to remain vigilant with regular cardiac follow up.  He has a hx of diabetes and is on oral tx for that.  His HgbA1C was last 8.4.  He is currently working with his primary MD.  He has seen an endocrinologist in the past.  He admits sweets and goodies are a bit of an issue at home.  He notes that he is physically active out in his yard, etc but does not get regular physical activity for the purpose of exercise.  His wife enjoys walking, an activity that he does not enjoy as much but will do from time to time.  He denies any exertional chest pain, chest discomfort or shortness of breath with activities.  He denies any light-headedness,  dizziness or syncope.  He is without cardiovascular complaints.      PAST MEDICAL HISTORY:  1.  Recurrent syncope s/p pacemaker  2.  Diabetes type II  3.  Hypertension  4.  Hyperlipidemia  5.  Hypothyroidism      MEDICATIONS:  Current Outpatient Medications   Medication     aspirin 81 MG EC tablet     atorvastatin (LIPITOR) 20 MG tablet     empagliflozin (JARDIANCE) 25 MG TABS tablet     glimepiride (AMARYL) 2 MG tablet     levothyroxine (SYNTHROID/LEVOTHROID) 50 MCG tablet     metFORMIN (GLUCOPHAGE) 500 MG tablet     pioglitazone (ACTOS) 45 MG tablet     triamcinolone (KENALOG) 0.1 % external cream     nitroGLYcerin (NITROSTAT) 0.4 MG sublingual tablet     No current facility-administered medications for this visit.        ALLERGIES:  No Known Allergies    SOCIAL HISTORY:  He is a nonsmoker.  Occasional ETOH.      FAMILY HISTORY:  I have reviewed this patient's family history and updated it with pertinent information if needed.   Family History   Problem Relation Age of Onset     Diabetes Father      Diabetes Sister      Diabetes Brother      Coronary Artery Disease No family hx of      Cerebrovascular Disease No family hx of      Colon Cancer No family hx of      Prostate Cancer No family hx of        REVIEW OF SYSTEMS:  A complete ROS was obtained and the pertinent positives are outlined in the history of present illness above.  The remainder of systems is negative.      PHYSICAL EXAM:                     Vital Signs with Ranges     0 lbs 0 oz    Constitutional: awake, alert, no distress  Eyes: PERRL, sclera nonicteric  ENT: trachea midline  Respiratory: CTAB  Cardiovascular: RRR no m/r/g, no JVD  GI: nondistended, nontender, bowel sounds present  Lymph/Hematologic: no lymphadenopathy  Skin: dry, no rash  Musculoskeletal: good muscle tone, no edema bilaterally  Neurologic: no focal deficits  Neuropsychiatric: appropriate affact    DATA:  Labs:   Reviewed in Epic      Echocardiogram 11/2019    Mild concentric LV  hypertrophy. LV wall motion & EF is normal. LVEF is   60-65%.    RV poorly visualized, systolic function appears normal.    LA & RA normal size.    Tricuspid AV. No AS/ AR.    Trace mitral regurgitation    RVSP is mildly elevated. RVSP 29 mmHg    Ascending aorta normal.        ASSESSMENT:  1.  Recurrent syncope s/p pacemaker:  Follows in device clinic with recent interrogation demonstrating normal function  2.  Hypertension:  Elevated today; previously at goal  3.  Hyperlipidemia:  AT goal on atrovastatin  4.  Diabetes type II:  On oral meds, last HGbA1C was 8.4.  Working with primary MD to optimize diabetes management    RECOMMENDATIONS:  1.  Reviewed the importance of a heart healthy diet and regular exercise for weight loss and to optimize diabetes management.  He is on good medical therapy with aspirin and atorvastatin which he should continue.  2.  Follow up with device clinic per usual routine  3.  Plan for follow up in 12 months or before than as needed.      Mora Arriaga MD  Cardiology - [facility]  November 13, 2020      Thank you for allowing me to participate in the care of your patient.      Sincerely,     Mora Arriaga MD     [facility]

## 2020-11-13 NOTE — PATIENT INSTRUCTIONS
-Increase activity level; consider setting step goals  -Continue efforts in heart healthy diet, cutting back on sweets  -Follow up in 12 months with Dr. Arriaga

## 2020-11-19 ENCOUNTER — ANCILLARY PROCEDURE (OUTPATIENT)
Dept: CARDIOLOGY | Facility: CLINIC | Age: 75
End: 2020-11-19
Attending: INTERNAL MEDICINE
Payer: MEDICARE

## 2020-11-19 DIAGNOSIS — Z95.0 PACEMAKER: ICD-10-CM

## 2020-11-19 PROCEDURE — 93294 REM INTERROG EVL PM/LDLS PM: CPT | Performed by: INTERNAL MEDICINE

## 2020-11-19 PROCEDURE — 93296 REM INTERROG EVL PM/IDS: CPT | Performed by: INTERNAL MEDICINE

## 2020-11-23 DIAGNOSIS — E11.9 TYPE 2 DIABETES MELLITUS WITHOUT COMPLICATION, WITHOUT LONG-TERM CURRENT USE OF INSULIN (H): ICD-10-CM

## 2020-11-23 NOTE — TELEPHONE ENCOUNTER
Pt is requesting a 90 day supply of all of his meds. He has an upcoming appt on 12/30/2020.  Last seen 8/31/2020.        Pending Prescriptions:                       Disp   Refills    glimepiride (AMARYL) 2 MG tablet [Pharmac*90 tab*0            Sig: Take 1 tablet by mouth once daily with breakfast    JARDIANCE 25 MG TABS tablet [Pharmacy Med*90 tab*0            Sig: Take 1 tablet by mouth once daily    atorvastatin (LIPITOR) 20 MG tablet [Phar*90 tab*0            Sig: Take 1 tablet by mouth once daily    levothyroxine (SYNTHROID/LEVOTHROID) 50 M*90 tab*0            Sig: Take 1 tablet by mouth once daily    metFORMIN (GLUCOPHAGE) 500 MG tablet [Pha*360 ta*0            Sig: TAKE 2 TABLETS BY MOUTH TWICE DAILY WITH MEALS    pioglitazone (ACTOS) 45 MG tablet [Pharma*90 tab*0            Sig: Take 1 tablet by mouth once daily

## 2020-11-24 LAB
MDC_IDC_MSMT_BATTERY_DTM: NORMAL
MDC_IDC_MSMT_BATTERY_IMPEDANCE: 963 OHM
MDC_IDC_MSMT_BATTERY_REMAINING_LONGEVITY: 81 MO
MDC_IDC_MSMT_BATTERY_STATUS: NORMAL
MDC_IDC_MSMT_BATTERY_VOLTAGE: 2.79 V
MDC_IDC_MSMT_LEADCHNL_RA_IMPEDANCE_VALUE: 490 OHM
MDC_IDC_MSMT_LEADCHNL_RA_PACING_THRESHOLD_AMPLITUDE: 0.62 V
MDC_IDC_MSMT_LEADCHNL_RA_PACING_THRESHOLD_PULSEWIDTH: 0.4 MS
MDC_IDC_MSMT_LEADCHNL_RV_IMPEDANCE_VALUE: 609 OHM
MDC_IDC_MSMT_LEADCHNL_RV_PACING_THRESHOLD_AMPLITUDE: 0.62 V
MDC_IDC_MSMT_LEADCHNL_RV_PACING_THRESHOLD_PULSEWIDTH: 0.4 MS
MDC_IDC_PG_MFG: NORMAL
MDC_IDC_PG_MODEL: NORMAL
MDC_IDC_PG_SERIAL: NORMAL
MDC_IDC_SESS_CLINIC_NAME: NORMAL
MDC_IDC_SESS_DTM: NORMAL
MDC_IDC_SESS_TYPE: NORMAL
MDC_IDC_SET_BRADY_AT_MODE_SWITCH_MODE: NORMAL
MDC_IDC_SET_BRADY_AT_MODE_SWITCH_RATE: 175 {BEATS}/MIN
MDC_IDC_SET_BRADY_LOWRATE: 60 {BEATS}/MIN
MDC_IDC_SET_BRADY_MAX_SENSOR_RATE: 140 {BEATS}/MIN
MDC_IDC_SET_BRADY_MAX_TRACKING_RATE: 140 {BEATS}/MIN
MDC_IDC_SET_BRADY_MODE: NORMAL
MDC_IDC_SET_BRADY_PAV_DELAY_LOW: 150 MS
MDC_IDC_SET_BRADY_SAV_DELAY_LOW: 120 MS
MDC_IDC_SET_LEADCHNL_RA_PACING_AMPLITUDE: 1.5 V
MDC_IDC_SET_LEADCHNL_RA_PACING_CAPTURE_MODE: NORMAL
MDC_IDC_SET_LEADCHNL_RA_PACING_POLARITY: NORMAL
MDC_IDC_SET_LEADCHNL_RA_PACING_PULSEWIDTH: 0.4 MS
MDC_IDC_SET_LEADCHNL_RA_SENSING_POLARITY: NORMAL
MDC_IDC_SET_LEADCHNL_RA_SENSING_SENSITIVITY: 0.5 MV
MDC_IDC_SET_LEADCHNL_RV_PACING_AMPLITUDE: 2 V
MDC_IDC_SET_LEADCHNL_RV_PACING_CAPTURE_MODE: NORMAL
MDC_IDC_SET_LEADCHNL_RV_PACING_POLARITY: NORMAL
MDC_IDC_SET_LEADCHNL_RV_PACING_PULSEWIDTH: 0.4 MS
MDC_IDC_SET_LEADCHNL_RV_SENSING_POLARITY: NORMAL
MDC_IDC_SET_LEADCHNL_RV_SENSING_SENSITIVITY: 2.8 MV
MDC_IDC_SET_ZONE_DETECTION_INTERVAL: 333.33 MS
MDC_IDC_SET_ZONE_DETECTION_INTERVAL: 342.86 MS
MDC_IDC_SET_ZONE_TYPE: NORMAL
MDC_IDC_SET_ZONE_TYPE: NORMAL
MDC_IDC_STAT_AT_BURDEN_PERCENT: 0 %
MDC_IDC_STAT_AT_DTM_END: NORMAL
MDC_IDC_STAT_AT_DTM_START: NORMAL
MDC_IDC_STAT_AT_MODE_SW_COUNT: 0
MDC_IDC_STAT_BRADY_AP_VP_PERCENT: 0 %
MDC_IDC_STAT_BRADY_AP_VS_PERCENT: 0 %
MDC_IDC_STAT_BRADY_AS_VP_PERCENT: 0 %
MDC_IDC_STAT_BRADY_AS_VS_PERCENT: 99 %
MDC_IDC_STAT_BRADY_DTM_END: NORMAL
MDC_IDC_STAT_BRADY_DTM_START: NORMAL
MDC_IDC_STAT_EPISODE_RECENT_COUNT: 0
MDC_IDC_STAT_EPISODE_RECENT_COUNT: 0
MDC_IDC_STAT_EPISODE_RECENT_COUNT_DTM_END: NORMAL
MDC_IDC_STAT_EPISODE_RECENT_COUNT_DTM_END: NORMAL
MDC_IDC_STAT_EPISODE_RECENT_COUNT_DTM_START: NORMAL
MDC_IDC_STAT_EPISODE_RECENT_COUNT_DTM_START: NORMAL
MDC_IDC_STAT_EPISODE_TYPE: NORMAL
MDC_IDC_STAT_EPISODE_TYPE: NORMAL

## 2020-11-24 RX ORDER — GLIMEPIRIDE 2 MG/1
TABLET ORAL
Qty: 90 TABLET | Refills: 0 | Status: SHIPPED | OUTPATIENT
Start: 2020-11-24 | End: 2020-12-30

## 2020-11-24 RX ORDER — LEVOTHYROXINE SODIUM 50 UG/1
TABLET ORAL
Qty: 90 TABLET | Refills: 0 | Status: SHIPPED | OUTPATIENT
Start: 2020-11-24 | End: 2020-12-30

## 2020-11-24 RX ORDER — EMPAGLIFLOZIN 25 MG/1
TABLET, FILM COATED ORAL
Qty: 90 TABLET | Refills: 0 | Status: SHIPPED | OUTPATIENT
Start: 2020-11-24 | End: 2020-12-30

## 2020-11-24 RX ORDER — ATORVASTATIN CALCIUM 20 MG/1
TABLET, FILM COATED ORAL
Qty: 90 TABLET | Refills: 0 | Status: SHIPPED | OUTPATIENT
Start: 2020-11-24 | End: 2020-12-30

## 2020-11-24 RX ORDER — PIOGLITAZONEHYDROCHLORIDE 45 MG/1
TABLET ORAL
Qty: 90 TABLET | Refills: 0 | Status: SHIPPED | OUTPATIENT
Start: 2020-11-24 | End: 2020-12-30

## 2020-12-16 DIAGNOSIS — E11.9 TYPE 2 DIABETES MELLITUS WITHOUT COMPLICATION, WITHOUT LONG-TERM CURRENT USE OF INSULIN (H): Primary | ICD-10-CM

## 2020-12-16 RX ORDER — BLOOD SUGAR DIAGNOSTIC
1 STRIP MISCELLANEOUS 2 TIMES DAILY
COMMUNITY
Start: 2020-07-14 | End: 2020-12-16

## 2020-12-16 RX ORDER — BLOOD SUGAR DIAGNOSTIC
1 STRIP MISCELLANEOUS DAILY
Qty: 100 STRIP | Refills: 3 | Status: SHIPPED | OUTPATIENT
Start: 2020-12-16 | End: 2023-08-03

## 2020-12-16 NOTE — TELEPHONE ENCOUNTER
Gaetano Sheriff is requesting a refill of:    Pending Prescriptions:                       Disp   Refills    ACCU-CHEK MICHAEL PLUS test strip           [address]*3            Si strip by In Vitro route daily    Signed Prescriptions:                        Disp   Refills    ACCU-CHEK MICHAEL PLUS test strip                            Si strip by In Vitro route 2 times daily  Authorizing Provider: PATIENT REPORTED  Ordering User: MIKE MAYS    Pt pharmacy is requesting a refill of test strips. Is not on insulin, Medicare will only cover once a day testing for oral

## 2020-12-30 ENCOUNTER — OFFICE VISIT (OUTPATIENT)
Dept: FAMILY MEDICINE | Facility: CLINIC | Age: 75
End: 2020-12-30

## 2020-12-30 VITALS
BODY MASS INDEX: 33.52 KG/M2 | DIASTOLIC BLOOD PRESSURE: 72 MMHG | HEIGHT: 74 IN | SYSTOLIC BLOOD PRESSURE: 142 MMHG | WEIGHT: 261.2 LBS | OXYGEN SATURATION: 98 % | TEMPERATURE: 97.8 F | HEART RATE: 86 BPM

## 2020-12-30 DIAGNOSIS — E03.9 HYPOTHYROIDISM, UNSPECIFIED TYPE: ICD-10-CM

## 2020-12-30 DIAGNOSIS — E78.2 MIXED HYPERLIPIDEMIA: ICD-10-CM

## 2020-12-30 DIAGNOSIS — Z12.5 SPECIAL SCREENING FOR MALIGNANT NEOPLASM OF PROSTATE: ICD-10-CM

## 2020-12-30 DIAGNOSIS — I25.10 ATHEROSCLEROSIS OF CORONARY ARTERY OF NATIVE HEART WITHOUT ANGINA PECTORIS, UNSPECIFIED VESSEL OR LESION TYPE: ICD-10-CM

## 2020-12-30 DIAGNOSIS — I10 ESSENTIAL HYPERTENSION: ICD-10-CM

## 2020-12-30 DIAGNOSIS — E11.9 TYPE 2 DIABETES MELLITUS WITHOUT COMPLICATION, WITHOUT LONG-TERM CURRENT USE OF INSULIN (H): Primary | ICD-10-CM

## 2020-12-30 LAB
ALBUMIN SERPL-MCNC: 4.3 G/DL (ref 3.6–5.1)
ALBUMIN/GLOB SERPL: 1.7 {RATIO} (ref 1–2.5)
ALP SERPL-CCNC: 61 U/L (ref 33–130)
ALT 1742-6: 10 U/L (ref 0–32)
AST 1920-8: 13 U/L (ref 0–35)
BILIRUB SERPL-MCNC: 1 MG/DL (ref 0.2–1.2)
BUN SERPL-MCNC: 26 MG/DL (ref 7–25)
BUN/CREATININE RATIO: 19.7 (ref 6–22)
CALCIUM SERPL-MCNC: 9.4 MG/DL (ref 8.6–10.3)
CHLORIDE SERPLBLD-SCNC: 103.5 MMOL/L (ref 98–110)
CHOLEST SERPL-MCNC: 140 MG/DL (ref 0–199)
CHOLEST/HDLC SERPL: 2 {RATIO} (ref 0–5)
CO2 SERPL-SCNC: 31.3 MMOL/L (ref 20–32)
CREAT SERPL-MCNC: 1.3 MG/DL (ref 0.7–1.3)
GLOBULIN, CALCULATED - QUEST: 2.6 (ref 1.9–3.7)
GLUCOSE SERPL-MCNC: 136 MG/DL (ref 60–99)
HBA1C MFR BLD: 8.2 % (ref 4–7)
HDLC SERPL-MCNC: 66 MG/DL (ref 40–150)
LDLC SERPL CALC-MCNC: 53 MG/DL (ref 0–130)
POTASSIUM SERPL-SCNC: 4.99 MMOL/L (ref 3.5–5.3)
PROT SERPL-MCNC: 6.9 G/DL (ref 6.1–8.1)
SODIUM SERPL-SCNC: 142.2 MMOL/L (ref 135–146)
TRIGL SERPL-MCNC: 107 MG/DL (ref 0–149)

## 2020-12-30 PROCEDURE — 80053 COMPREHEN METABOLIC PANEL: CPT | Performed by: FAMILY MEDICINE

## 2020-12-30 PROCEDURE — 80061 LIPID PANEL: CPT | Performed by: FAMILY MEDICINE

## 2020-12-30 PROCEDURE — 36415 COLL VENOUS BLD VENIPUNCTURE: CPT | Performed by: FAMILY MEDICINE

## 2020-12-30 PROCEDURE — 99214 OFFICE O/P EST MOD 30 MIN: CPT | Performed by: FAMILY MEDICINE

## 2020-12-30 PROCEDURE — 83036 HEMOGLOBIN GLYCOSYLATED A1C: CPT | Performed by: FAMILY MEDICINE

## 2020-12-30 RX ORDER — GLIMEPIRIDE 2 MG/1
TABLET ORAL
Qty: 90 TABLET | Refills: 3 | Status: SHIPPED | OUTPATIENT
Start: 2020-12-30 | End: 2021-07-27

## 2020-12-30 RX ORDER — PIOGLITAZONEHYDROCHLORIDE 45 MG/1
45 TABLET ORAL DAILY
Qty: 90 TABLET | Refills: 3 | Status: SHIPPED | OUTPATIENT
Start: 2020-12-30 | End: 2022-01-25

## 2020-12-30 RX ORDER — LEVOTHYROXINE SODIUM 50 UG/1
50 TABLET ORAL DAILY
Qty: 90 TABLET | Refills: 3 | Status: SHIPPED | OUTPATIENT
Start: 2020-12-30 | End: 2022-01-25

## 2020-12-30 RX ORDER — ATORVASTATIN CALCIUM 20 MG/1
20 TABLET, FILM COATED ORAL DAILY
Qty: 90 TABLET | Refills: 3 | Status: SHIPPED | OUTPATIENT
Start: 2020-12-30 | End: 2021-09-16 | Stop reason: ALTCHOICE

## 2020-12-30 ASSESSMENT — MIFFLIN-ST. JEOR: SCORE: 1984.55

## 2020-12-30 NOTE — NURSING NOTE
Gaetano is here today for a med recheck and fasting A1C.    Pre-visit Screening:  Immunizations:  up to date  Colonoscopy:  is up to date  Mammogram: NA  Asthma Action Test/Plan:  BRIGHT  PHQ9:  NA  GAD7:  NA  Questioned patient about current smoking habits Pt. has never smoked.  Ok to leave detailed message on voice mail for today's visit only Yes, phone # [phone]

## 2020-12-30 NOTE — LETTER
December 31, 2020      Gaetano Sheriff  [address]   [place] [address]        Dear ,    We are writing to inform you of your test results.    Your test results fall within the expected range(s) or remain unchanged from previous results.  Please continue with current treatment plan.  Lipids, liver, kidney, thyroid, and PSA look good.     Resulted Orders   Hemoglobin A1c ([facility])   Result Value Ref Range    Hemoglobin A1C 8.2 (A) 4.0 - 7.0 %   Lipid Panel ([facility])   Result Value Ref Range    Cholesterol 140 0 - 199 mg/dL    Triglycerides 107 0 - 149 mg/dL    HDL Cholesterol 66 40 - 150 mg/dL    LDL Cholesterol Direct 53 0 - 130 mg/dL    Cholesterol/HDL Ratio 2 0 - 5   Comprehensive Metobolic Panel (BFP)   Result Value Ref Range    Carbon Dioxide 31.3 20 - 32 mmol/L    Creatinine 1.30 0.70 - 1.30 mg/dL    Glucose 136 (A) 60 - 99 mg/dL    Sodium 142.2 135 - 146 mmol/L    Potassium 4.99 3.5 - 5.3 mmol/L    Chloride 103.5 98 - 110 mmol/L    Protein Total 6.9 6.1 - 8.1 g/dL    Albumin 4.3 3.6 - 5.1 g/dL    Alkaline Phosphatase 61 33 - 130 U/L    ALT 10 0 - 32 U/L    AST 13 0 - 35 U/L    Bilirubin Total 1.0 0.2 - 1.2 mg/dL    Urea Nitrogen 26 (A) 7 - 25 mg/dL    Calcium 9.4 8.6 - 10.3 mg/dL    BUN/Creatinine Ratio 19.7 6 - 22    Globulin Calculated 2.6 1.9 - 3.7    A/G Ratio 1.7 1 - 2.5   PSA Total ([facility])   Result Value Ref Range    Abbott PSA 0.7 < OR = 4.0 ng/mL      Comment:      The total PSA value from this assay system is   standardized against the WHO standard. The test   result will be approximately 20% lower when compared   to the equimolar-standardized total PSA (Wolf   Nicole). Comparison of serial PSA results should be   interpreted with this fact in mind.     This test was performed using the Siemens   chemiluminescent method. Values obtained from   different assay methods cannot be used  interchangeably. PSA levels, regardless of  value, should not be interpreted as absolute  evidence of  the presence or absence of disease.     TSH with free T4 reflex ([facility])   Result Value Ref Range    TSH 4.36 0.40 - 4.50 mIU/L       If you have any questions or concerns, please call the clinic at the number listed above.       Sincerely,      Lm Garnica MD

## 2020-12-30 NOTE — PROGRESS NOTES
Subjective     Gaetano Sheriff is a 75 year old male who presents to clinic today for the following health issues:    HPI         Diabetes Follow-up-Actos, Metformin, Jardiance, Glimeperide    How often are you checking your blood sugar? One time daily  What time of day are you checking your blood sugars (select all that apply)?  Before meals, average 118  Have you had any blood sugars above 200?  No  Have you had any blood sugars below 70?  No    What symptoms do you notice when your blood sugar is low?  None    What concerns do you have today about your diabetes? None     Do you have any of these symptoms? (Select all that apply)  No numbness or tingling in feet.  No redness, sores or blisters on feet.  No complaints of excessive thirst.  No reports of blurry vision.  No significant changes to weight.    Have you had a diabetic eye exam in the last 12 months? Yes, last week                Hyperlipidemia Follow-Up      Are you regularly taking any medication or supplement to lower your cholesterol?   Yes- atorvastatin    Are you having muscle aches or other side effects that you think could be caused by your cholesterol lowering medication?  No    Hypertension Follow-up      Do you check your blood pressure regularly outside of the clinic? Yes     Are you following a low salt diet? No    Are your blood pressures ever more than 140 on the top number (systolic) OR more   than 90 on the bottom number (diastolic), for example 140/90? No    BP Readings from Last 2 Encounters:   12/30/20 (!) 142/72   11/13/20 (!) 158/81     Hemoglobin A1C (%)   Date Value   08/31/2020 8.4 (A)   04/02/2020 8.9 (A)     LDL Cholesterol Direct (mg/dL)   Date Value   08/31/2020 58       Vascular Disease Follow-up      How often do you take nitroglycerin? Never    Do you take an aspirin every day? Yes      How many servings of fruits and vegetables do you eat daily?  2-3    On average, how many sweetened beverages do you drink each day  "(Examples: soda, juice, sweet tea, etc.  Do NOT count diet or artificially sweetened beverages)?   1    How many days per week do you exercise enough to make your heart beat faster? Just yard work    How many minutes a day do you exercise enough to make your heart beat faster?     How many days per week do you miss taking your medication? 0    Hypothyroidism Follow-up      Since last visit, patient describes the following symptoms: Weight stable, no hair loss, no skin changes, no constipation, no loose stools        Review of Systems   Constitutional, HEENT, cardiovascular, pulmonary, gi and gu systems are negative, except as otherwise noted.      Objective    BP (!) 142/72 (BP Location: Left arm, Patient Position: Sitting, Cuff Size: Adult Large)   Pulse 86   Temp 97.8  F (36.6  C) (Oral)   Ht 1.88 m (6' 2\")   Wt 118.5 kg (261 lb 3.2 oz)   SpO2 98%   BMI 33.54 kg/m    Body mass index is 33.54 kg/m .  Physical Exam   GENERAL: healthy, alert and no distress  EYES: Eyes grossly normal to inspection, PERRL and conjunctivae and sclerae normal  HENT: ear canals and TM's normal, nose and mouth without ulcers or lesions  NECK: no adenopathy, no asymmetry, masses, or scars and thyroid normal to palpation  RESP: lungs clear to auscultation - no rales, rhonchi or wheezes  CV: regular rate and rhythm, normal S1 S2, no S3 or S4, no murmur, click or rub, no peripheral edema and peripheral pulses strong  ABDOMEN: soft, nontender, no hepatosplenomegaly, no masses and bowel sounds normal  MS: no gross musculoskeletal defects noted, no edema  SKIN: no suspicious lesions or rashes  NEURO: Normal strength and tone, mentation intact and speech normal  PSYCH: mentation appears normal, affect normal/bright  Diabetic foot exam: normal DP and PT pulses, no trophic changes or ulcerative lesions and normal sensory exam    Results for orders placed or performed in visit on 12/30/20 (from the past 24 hour(s))   Hemoglobin A1c (BFP) "   Result Value Ref Range    Hemoglobin A1C 8.2 (A) 4.0 - 7.0 %           Assessment & Plan     Type 2 diabetes mellitus without complication, without long-term current use of insulin (H)  Slight improvement in control, still suboptimal but has not been eating well-discussed option to increase meds but he prefers to work on diet/weight loss  - Hemoglobin A1c (BFP)  - VENOUS COLLECTION  - atorvastatin (LIPITOR) 20 MG tablet  Dispense: 90 tablet; Refill: 1  - glimepiride (AMARYL) 2 MG tablet  Dispense: 90 tablet; Refill: 1  - empagliflozin (JARDIANCE) 25 MG TABS tablet  Dispense: 90 tablet; Refill: 1  - levothyroxine (SYNTHROID/LEVOTHROID) 50 MCG tablet  Dispense: 90 tablet; Refill: 1  - metFORMIN (GLUCOPHAGE) 500 MG tablet  Dispense: 360 tablet; Refill: 1  - pioglitazone (ACTOS) 45 MG tablet  Dispense: 90 tablet; Refill: 1    Mixed hyperlipidemia  Control uncertain, continue current medications at current doses pending labs    Essential hypertension  Borderline here, well controlled at home, Check blood pressure readings outside of the clinic several times per week, write down values, and follow up if elevated within the next several weeks. Blood pressure can be checked at the firestation, drugstore,  or any valid site.     Atherosclerosis of coronary artery of native heart without angina pectoris, unspecified vessel or lesion type  stable symptomatically , continue current medications at current doses     Hypothyroidism, unspecified type  stable symptomatically , continue current medications at current doses pending labs    Special screening for malignant neoplasm of prostate            FUTURE APPOINTMENTS:       - Follow-up visit in 6 mo, but OK for meds for a year a she travels a lot and this helps  Work on weight loss  Regular exercise    No follow-ups on file.    Lm Garnica MD  [facility]

## 2020-12-31 LAB
ABBOTT PSA - QUEST: 0.7 NG/ML
TSH SERPL-ACNC: 4.36 MIU/L (ref 0.4–4.5)

## 2021-02-03 ENCOUNTER — IMMUNIZATION (OUTPATIENT)
Dept: NURSING | Facility: CLINIC | Age: 76
End: 2021-02-03
Payer: MEDICARE

## 2021-02-03 PROCEDURE — 0001A PR COVID VAC PFIZER DIL RECON 30 MCG/0.3 ML IM: CPT

## 2021-02-03 PROCEDURE — 91300 PR COVID VAC PFIZER DIL RECON 30 MCG/0.3 ML IM: CPT

## 2021-02-24 ENCOUNTER — IMMUNIZATION (OUTPATIENT)
Dept: NURSING | Facility: CLINIC | Age: 76
End: 2021-02-24
Attending: INTERNAL MEDICINE
Payer: MEDICARE

## 2021-02-24 PROCEDURE — 91300 PR COVID VAC PFIZER DIL RECON 30 MCG/0.3 ML IM: CPT

## 2021-02-24 PROCEDURE — 0002A PR COVID VAC PFIZER DIL RECON 30 MCG/0.3 ML IM: CPT

## 2021-03-11 ENCOUNTER — ANCILLARY PROCEDURE (OUTPATIENT)
Dept: CARDIOLOGY | Facility: CLINIC | Age: 76
End: 2021-03-11
Attending: INTERNAL MEDICINE
Payer: MEDICARE

## 2021-03-11 DIAGNOSIS — Z95.0 CARDIAC PACEMAKER IN SITU: ICD-10-CM

## 2021-03-11 PROCEDURE — 93294 REM INTERROG EVL PM/LDLS PM: CPT | Performed by: INTERNAL MEDICINE

## 2021-03-11 PROCEDURE — 93296 REM INTERROG EVL PM/IDS: CPT | Performed by: INTERNAL MEDICINE

## 2021-03-15 LAB
MDC_IDC_LEAD_IMPLANT_DT: NORMAL
MDC_IDC_LEAD_IMPLANT_DT: NORMAL
MDC_IDC_LEAD_LOCATION: NORMAL
MDC_IDC_LEAD_LOCATION: NORMAL
MDC_IDC_LEAD_LOCATION_DETAIL_1: NORMAL
MDC_IDC_LEAD_LOCATION_DETAIL_1: NORMAL
MDC_IDC_LEAD_MFG: NORMAL
MDC_IDC_LEAD_MFG: NORMAL
MDC_IDC_LEAD_MODEL: NORMAL
MDC_IDC_LEAD_MODEL: NORMAL
MDC_IDC_LEAD_POLARITY_TYPE: NORMAL
MDC_IDC_LEAD_POLARITY_TYPE: NORMAL
MDC_IDC_LEAD_SERIAL: NORMAL
MDC_IDC_LEAD_SERIAL: NORMAL
MDC_IDC_MSMT_BATTERY_DTM: NORMAL
MDC_IDC_MSMT_BATTERY_IMPEDANCE: 1014 OHM
MDC_IDC_MSMT_BATTERY_REMAINING_LONGEVITY: 78 MO
MDC_IDC_MSMT_BATTERY_STATUS: NORMAL
MDC_IDC_MSMT_BATTERY_VOLTAGE: 2.78 V
MDC_IDC_MSMT_LEADCHNL_RA_IMPEDANCE_VALUE: 497 OHM
MDC_IDC_MSMT_LEADCHNL_RA_PACING_THRESHOLD_AMPLITUDE: 0.5 V
MDC_IDC_MSMT_LEADCHNL_RA_PACING_THRESHOLD_PULSEWIDTH: 0.4 MS
MDC_IDC_MSMT_LEADCHNL_RV_IMPEDANCE_VALUE: 653 OHM
MDC_IDC_MSMT_LEADCHNL_RV_PACING_THRESHOLD_AMPLITUDE: 0.62 V
MDC_IDC_MSMT_LEADCHNL_RV_PACING_THRESHOLD_PULSEWIDTH: 0.4 MS
MDC_IDC_PG_IMPLANT_DTM: NORMAL
MDC_IDC_PG_MFG: NORMAL
MDC_IDC_PG_MODEL: NORMAL
MDC_IDC_PG_SERIAL: NORMAL
MDC_IDC_PG_TYPE: NORMAL
MDC_IDC_SESS_CLINIC_NAME: NORMAL
MDC_IDC_SESS_DTM: NORMAL
MDC_IDC_SESS_TYPE: NORMAL
MDC_IDC_SET_BRADY_AT_MODE_SWITCH_MODE: NORMAL
MDC_IDC_SET_BRADY_AT_MODE_SWITCH_RATE: 175 {BEATS}/MIN
MDC_IDC_SET_BRADY_LOWRATE: 60 {BEATS}/MIN
MDC_IDC_SET_BRADY_MAX_SENSOR_RATE: 140 {BEATS}/MIN
MDC_IDC_SET_BRADY_MAX_TRACKING_RATE: 140 {BEATS}/MIN
MDC_IDC_SET_BRADY_MODE: NORMAL
MDC_IDC_SET_BRADY_PAV_DELAY_LOW: 150 MS
MDC_IDC_SET_BRADY_SAV_DELAY_LOW: 120 MS
MDC_IDC_SET_LEADCHNL_RA_PACING_AMPLITUDE: 1.5 V
MDC_IDC_SET_LEADCHNL_RA_PACING_CAPTURE_MODE: NORMAL
MDC_IDC_SET_LEADCHNL_RA_PACING_POLARITY: NORMAL
MDC_IDC_SET_LEADCHNL_RA_PACING_PULSEWIDTH: 0.4 MS
MDC_IDC_SET_LEADCHNL_RA_SENSING_POLARITY: NORMAL
MDC_IDC_SET_LEADCHNL_RA_SENSING_SENSITIVITY: 0.5 MV
MDC_IDC_SET_LEADCHNL_RV_PACING_AMPLITUDE: 2 V
MDC_IDC_SET_LEADCHNL_RV_PACING_CAPTURE_MODE: NORMAL
MDC_IDC_SET_LEADCHNL_RV_PACING_POLARITY: NORMAL
MDC_IDC_SET_LEADCHNL_RV_PACING_PULSEWIDTH: 0.4 MS
MDC_IDC_SET_LEADCHNL_RV_SENSING_POLARITY: NORMAL
MDC_IDC_SET_LEADCHNL_RV_SENSING_SENSITIVITY: 2.8 MV
MDC_IDC_SET_ZONE_DETECTION_INTERVAL: 333.33 MS
MDC_IDC_SET_ZONE_DETECTION_INTERVAL: 342.86 MS
MDC_IDC_SET_ZONE_TYPE: NORMAL
MDC_IDC_SET_ZONE_TYPE: NORMAL
MDC_IDC_STAT_AT_BURDEN_PERCENT: 0 %
MDC_IDC_STAT_AT_DTM_END: NORMAL
MDC_IDC_STAT_AT_DTM_START: NORMAL
MDC_IDC_STAT_AT_MODE_SW_COUNT: 0
MDC_IDC_STAT_BRADY_AP_VP_PERCENT: 0 %
MDC_IDC_STAT_BRADY_AP_VS_PERCENT: 0 %
MDC_IDC_STAT_BRADY_AS_VP_PERCENT: 0 %
MDC_IDC_STAT_BRADY_AS_VS_PERCENT: 99 %
MDC_IDC_STAT_BRADY_DTM_END: NORMAL
MDC_IDC_STAT_BRADY_DTM_START: NORMAL
MDC_IDC_STAT_EPISODE_RECENT_COUNT: 0
MDC_IDC_STAT_EPISODE_RECENT_COUNT: 0
MDC_IDC_STAT_EPISODE_RECENT_COUNT_DTM_END: NORMAL
MDC_IDC_STAT_EPISODE_RECENT_COUNT_DTM_END: NORMAL
MDC_IDC_STAT_EPISODE_RECENT_COUNT_DTM_START: NORMAL
MDC_IDC_STAT_EPISODE_RECENT_COUNT_DTM_START: NORMAL
MDC_IDC_STAT_EPISODE_TYPE: NORMAL
MDC_IDC_STAT_EPISODE_TYPE: NORMAL

## 2021-03-21 ENCOUNTER — HEALTH MAINTENANCE LETTER (OUTPATIENT)
Age: 76
End: 2021-03-21

## 2021-05-16 ENCOUNTER — HEALTH MAINTENANCE LETTER (OUTPATIENT)
Age: 76
End: 2021-05-16

## 2021-06-24 ENCOUNTER — ANCILLARY PROCEDURE (OUTPATIENT)
Dept: CARDIOLOGY | Facility: CLINIC | Age: 76
End: 2021-06-24
Attending: INTERNAL MEDICINE
Payer: MEDICARE

## 2021-06-24 DIAGNOSIS — Z95.0 CARDIAC PACEMAKER IN SITU: ICD-10-CM

## 2021-06-24 PROCEDURE — 93294 REM INTERROG EVL PM/LDLS PM: CPT | Performed by: INTERNAL MEDICINE

## 2021-06-24 PROCEDURE — 93296 REM INTERROG EVL PM/IDS: CPT | Performed by: INTERNAL MEDICINE

## 2021-07-09 LAB
MDC_IDC_LEAD_IMPLANT_DT: NORMAL
MDC_IDC_LEAD_IMPLANT_DT: NORMAL
MDC_IDC_LEAD_LOCATION: NORMAL
MDC_IDC_LEAD_LOCATION: NORMAL
MDC_IDC_LEAD_LOCATION_DETAIL_1: NORMAL
MDC_IDC_LEAD_LOCATION_DETAIL_1: NORMAL
MDC_IDC_LEAD_MFG: NORMAL
MDC_IDC_LEAD_MFG: NORMAL
MDC_IDC_LEAD_MODEL: NORMAL
MDC_IDC_LEAD_MODEL: NORMAL
MDC_IDC_LEAD_POLARITY_TYPE: NORMAL
MDC_IDC_LEAD_POLARITY_TYPE: NORMAL
MDC_IDC_LEAD_SERIAL: NORMAL
MDC_IDC_LEAD_SERIAL: NORMAL
MDC_IDC_MSMT_BATTERY_DTM: NORMAL
MDC_IDC_MSMT_BATTERY_IMPEDANCE: 1146 OHM
MDC_IDC_MSMT_BATTERY_REMAINING_LONGEVITY: 73 MO
MDC_IDC_MSMT_BATTERY_STATUS: NORMAL
MDC_IDC_MSMT_BATTERY_VOLTAGE: 2.78 V
MDC_IDC_MSMT_LEADCHNL_RA_IMPEDANCE_VALUE: 497 OHM
MDC_IDC_MSMT_LEADCHNL_RA_PACING_THRESHOLD_AMPLITUDE: 0.62 V
MDC_IDC_MSMT_LEADCHNL_RA_PACING_THRESHOLD_PULSEWIDTH: 0.4 MS
MDC_IDC_MSMT_LEADCHNL_RV_IMPEDANCE_VALUE: 647 OHM
MDC_IDC_MSMT_LEADCHNL_RV_PACING_THRESHOLD_AMPLITUDE: 0.62 V
MDC_IDC_MSMT_LEADCHNL_RV_PACING_THRESHOLD_PULSEWIDTH: 0.4 MS
MDC_IDC_PG_IMPLANT_DTM: NORMAL
MDC_IDC_PG_MFG: NORMAL
MDC_IDC_PG_MODEL: NORMAL
MDC_IDC_PG_SERIAL: NORMAL
MDC_IDC_PG_TYPE: NORMAL
MDC_IDC_SESS_CLINIC_NAME: NORMAL
MDC_IDC_SESS_DTM: NORMAL
MDC_IDC_SESS_TYPE: NORMAL
MDC_IDC_SET_BRADY_AT_MODE_SWITCH_MODE: NORMAL
MDC_IDC_SET_BRADY_AT_MODE_SWITCH_RATE: 175 {BEATS}/MIN
MDC_IDC_SET_BRADY_LOWRATE: 60 {BEATS}/MIN
MDC_IDC_SET_BRADY_MAX_SENSOR_RATE: 140 {BEATS}/MIN
MDC_IDC_SET_BRADY_MAX_TRACKING_RATE: 140 {BEATS}/MIN
MDC_IDC_SET_BRADY_MODE: NORMAL
MDC_IDC_SET_BRADY_PAV_DELAY_LOW: 150 MS
MDC_IDC_SET_BRADY_SAV_DELAY_LOW: 120 MS
MDC_IDC_SET_LEADCHNL_RA_PACING_AMPLITUDE: 1.5 V
MDC_IDC_SET_LEADCHNL_RA_PACING_CAPTURE_MODE: NORMAL
MDC_IDC_SET_LEADCHNL_RA_PACING_POLARITY: NORMAL
MDC_IDC_SET_LEADCHNL_RA_PACING_PULSEWIDTH: 0.4 MS
MDC_IDC_SET_LEADCHNL_RA_SENSING_POLARITY: NORMAL
MDC_IDC_SET_LEADCHNL_RA_SENSING_SENSITIVITY: 0.5 MV
MDC_IDC_SET_LEADCHNL_RV_PACING_AMPLITUDE: 2 V
MDC_IDC_SET_LEADCHNL_RV_PACING_CAPTURE_MODE: NORMAL
MDC_IDC_SET_LEADCHNL_RV_PACING_POLARITY: NORMAL
MDC_IDC_SET_LEADCHNL_RV_PACING_PULSEWIDTH: 0.4 MS
MDC_IDC_SET_LEADCHNL_RV_SENSING_POLARITY: NORMAL
MDC_IDC_SET_LEADCHNL_RV_SENSING_SENSITIVITY: 2.8 MV
MDC_IDC_SET_ZONE_DETECTION_INTERVAL: 333.33 MS
MDC_IDC_SET_ZONE_DETECTION_INTERVAL: 342.86 MS
MDC_IDC_SET_ZONE_TYPE: NORMAL
MDC_IDC_SET_ZONE_TYPE: NORMAL
MDC_IDC_STAT_AT_BURDEN_PERCENT: 0 %
MDC_IDC_STAT_AT_DTM_END: NORMAL
MDC_IDC_STAT_AT_DTM_START: NORMAL
MDC_IDC_STAT_AT_MODE_SW_COUNT: 1
MDC_IDC_STAT_BRADY_AP_VP_PERCENT: 0 %
MDC_IDC_STAT_BRADY_AP_VS_PERCENT: 0 %
MDC_IDC_STAT_BRADY_AS_VP_PERCENT: 0 %
MDC_IDC_STAT_BRADY_AS_VS_PERCENT: 99 %
MDC_IDC_STAT_BRADY_DTM_END: NORMAL
MDC_IDC_STAT_BRADY_DTM_START: NORMAL
MDC_IDC_STAT_EPISODE_RECENT_COUNT: 0
MDC_IDC_STAT_EPISODE_RECENT_COUNT: 0
MDC_IDC_STAT_EPISODE_RECENT_COUNT_DTM_END: NORMAL
MDC_IDC_STAT_EPISODE_RECENT_COUNT_DTM_END: NORMAL
MDC_IDC_STAT_EPISODE_RECENT_COUNT_DTM_START: NORMAL
MDC_IDC_STAT_EPISODE_RECENT_COUNT_DTM_START: NORMAL
MDC_IDC_STAT_EPISODE_TYPE: NORMAL
MDC_IDC_STAT_EPISODE_TYPE: NORMAL

## 2021-07-27 ENCOUNTER — OFFICE VISIT (OUTPATIENT)
Dept: FAMILY MEDICINE | Facility: CLINIC | Age: 76
End: 2021-07-27

## 2021-07-27 VITALS
OXYGEN SATURATION: 97 % | HEIGHT: 74 IN | BODY MASS INDEX: 33.5 KG/M2 | TEMPERATURE: 98 F | SYSTOLIC BLOOD PRESSURE: 140 MMHG | HEART RATE: 76 BPM | DIASTOLIC BLOOD PRESSURE: 70 MMHG | WEIGHT: 261 LBS

## 2021-07-27 DIAGNOSIS — I10 ESSENTIAL HYPERTENSION: ICD-10-CM

## 2021-07-27 DIAGNOSIS — E03.9 HYPOTHYROIDISM, UNSPECIFIED TYPE: ICD-10-CM

## 2021-07-27 DIAGNOSIS — E78.2 MIXED HYPERLIPIDEMIA: ICD-10-CM

## 2021-07-27 DIAGNOSIS — Z12.11 SPECIAL SCREENING FOR MALIGNANT NEOPLASMS, COLON: ICD-10-CM

## 2021-07-27 DIAGNOSIS — E11.9 TYPE 2 DIABETES MELLITUS WITHOUT COMPLICATION, WITHOUT LONG-TERM CURRENT USE OF INSULIN (H): Primary | ICD-10-CM

## 2021-07-27 LAB
ALBUMIN SERPL-MCNC: 4.6 G/DL (ref 3.6–5.1)
ALBUMIN/GLOB SERPL: 1.6 {RATIO} (ref 1–2.5)
ALP SERPL-CCNC: 65 U/L (ref 33–130)
ALT 1742-6: 14 U/L (ref 0–32)
AST 1920-8: 16 U/L (ref 0–35)
BILIRUB SERPL-MCNC: 1.1 MG/DL (ref 0.2–1.2)
BUN SERPL-MCNC: 29 MG/DL (ref 7–25)
BUN/CREATININE RATIO: 23.2 (ref 6–22)
CALCIUM SERPL-MCNC: 10.1 MG/DL (ref 8.6–10.3)
CHLORIDE SERPLBLD-SCNC: 103.8 MMOL/L (ref 98–110)
CHOLEST SERPL-MCNC: 164 MG/DL (ref 0–199)
CHOLEST/HDLC SERPL: 2 {RATIO} (ref 0–5)
CO2 SERPL-SCNC: 31.2 MMOL/L (ref 20–32)
CREAT SERPL-MCNC: 1.25 MG/DL (ref 0.6–1.3)
GLOBULIN, CALCULATED - QUEST: 2.8 (ref 1.9–3.7)
GLUCOSE SERPL-MCNC: 171 MG/DL (ref 60–99)
HBA1C MFR BLD: 8.8 % (ref 4–7)
HDLC SERPL-MCNC: 72 MG/DL (ref 40–150)
LDLC SERPL CALC-MCNC: 67 MG/DL (ref 0–130)
POTASSIUM SERPL-SCNC: 4.6 MMOL/L (ref 3.5–5.3)
PROT SERPL-MCNC: 7.4 G/DL (ref 6.1–8.1)
SODIUM SERPL-SCNC: 141.4 MMOL/L (ref 135–146)
TRIGL SERPL-MCNC: 125 MG/DL (ref 0–149)

## 2021-07-27 PROCEDURE — 80053 COMPREHEN METABOLIC PANEL: CPT | Performed by: FAMILY MEDICINE

## 2021-07-27 PROCEDURE — 80061 LIPID PANEL: CPT | Performed by: FAMILY MEDICINE

## 2021-07-27 PROCEDURE — 83036 HEMOGLOBIN GLYCOSYLATED A1C: CPT | Performed by: FAMILY MEDICINE

## 2021-07-27 PROCEDURE — 36415 COLL VENOUS BLD VENIPUNCTURE: CPT | Performed by: FAMILY MEDICINE

## 2021-07-27 PROCEDURE — 99214 OFFICE O/P EST MOD 30 MIN: CPT | Performed by: FAMILY MEDICINE

## 2021-07-27 RX ORDER — GLIMEPIRIDE 4 MG/1
4 TABLET ORAL
Qty: 90 TABLET | Refills: 1 | COMMUNITY
Start: 2021-07-27 | End: 2022-01-25

## 2021-07-27 RX ORDER — GLIMEPIRIDE 4 MG/1
4 TABLET ORAL
Qty: 90 TABLET | Refills: 1 | Status: SHIPPED | OUTPATIENT
Start: 2021-07-27 | End: 2021-07-27

## 2021-07-27 ASSESSMENT — MIFFLIN-ST. JEOR: SCORE: 1983.64

## 2021-07-27 NOTE — PROGRESS NOTES
"    Assessment & Plan     Type 2 diabetes mellitus without complication, without long-term current use of insulin (H)  suboptimal control- poor diet of late, discussed need to work aggressively on diet, will also increase glimeperide, check sugars daily-watch for lows  - HEMOGLOBIN A1C (BFP)  - VENOUS COLLECTION  - Comprehensive Metobolic Panel (BFP)  - glimepiride (AMARYL) 4 MG tablet  Dispense: 90 tablet; Refill: 1    Mixed hyperlipidemia  Control uncertain, continue current medications at current doses pending labs  - Lipid Panel (BFP)  - Comprehensive Metobolic Panel (BFP)    Essential hypertension  Well controlled at home, slight white coat here  - Comprehensive Metobolic Panel (BFP)    Hypothyroidism, unspecified type  stable symptomatically continue current medications at current doses     Special screening for malignant neoplasms, colon  Pt considering cologuard-wants to check with insurance      Review of the result(s) of each unique test - labs  Ordering of each unique test  Prescription drug management         BMI:   Estimated body mass index is 33.51 kg/m  as calculated from the following:    Height as of this encounter: 1.88 m (6' 2\").    Weight as of this encounter: 118.4 kg (261 lb).   Weight management plan: Discussed healthy diet and exercise guidelines    FUTURE APPOINTMENTS:       - Follow-up visit in 5 mo  Work on weight loss  Regular exercise    No follow-ups on file.    Lm Garnica MD  [facility]    Natalia Salter is a 75 year old who presents for the following health issues     HPI     Diabetes Follow-up    How often are you checking your blood sugar? A few times a week  What time of day are you checking your blood sugars (select all that apply)?  Before meals  Have you had any blood sugars above 200?  No  Have you had any blood sugars below 70?  No    What symptoms do you notice when your blood sugar is low?  None    What concerns do you have today about " your diabetes? None and Other: has not been eating well, expects high A1C     Do you have any of these symptoms? (Select all that apply)  Numbness in feet    Have you had a diabetic eye exam in the last 12 months? [place] [facility]                Hyperlipidemia Follow-Up      Are you regularly taking any medication or supplement to lower your cholesterol?   Yes- atorvastatin    Are you having muscle aches or other side effects that you think could be caused by your cholesterol lowering medication?  No    Hypertension Follow-up      Do you check your blood pressure regularly outside of the clinic? Yes     Are you following a low salt diet? No    Are your blood pressures ever more than 140 on the top number (systolic) OR more   than 90 on the bottom number (diastolic), for example 140/90? No    BP Readings from Last 2 Encounters:   07/27/21 (!) 140/70   12/30/20 (!) 142/72     Hemoglobin A1C (%)   Date Value   12/30/2020 8.2 (A)   08/31/2020 8.4 (A)     LDL Cholesterol Direct (mg/dL)   Date Value   12/30/2020 53   08/31/2020 58       Hypothyroidism Follow-up      Since last visit, patient describes the following symptoms: Weight stable, no hair loss, no skin changes, no constipation, no loose stools      How many servings of fruits and vegetables do you eat daily?  2-3    On average, how many sweetened beverages do you drink each day (Examples: soda, juice, sweet tea, etc.  Do NOT count diet or artificially sweetened beverages)?   1    How many days per week do you exercise enough to make your heart beat faster? 3 or less    How many minutes a day do you exercise enough to make your heart beat faster? 30 - 60    How many days per week do you miss taking your medication? 0        Review of Systems   Constitutional, HEENT, cardiovascular, pulmonary, gi and gu systems are negative, except as otherwise noted.      Objective    BP (!) 140/70 (BP Location: Right arm, Patient Position: Sitting, Cuff Size: Adult Large)    "Pulse 76   Temp 98  F (36.7  C)   Ht 1.88 m (6' 2\")   Wt 118.4 kg (261 lb)   SpO2 97%   BMI 33.51 kg/m    Body mass index is 33.51 kg/m .  Physical Exam   GENERAL: healthy, alert and no distress  EYES: Eyes grossly normal to inspection, PERRL and conjunctivae and sclerae normal  HENT: ear canals and TM's normal, nose and mouth without ulcers or lesions  NECK: no adenopathy, no asymmetry, masses, or scars and thyroid normal to palpation  RESP: lungs clear to auscultation - no rales, rhonchi or wheezes  CV: regular rate and rhythm, normal S1 S2, no S3 or S4, no murmur, click or rub, no peripheral edema and peripheral pulses strong  ABDOMEN: soft, nontender, no hepatosplenomegaly, no masses and bowel sounds normal  MS: no gross musculoskeletal defects noted, no edema  PSYCH: mentation appears normal, affect normal/bright  Diabetic foot exam: normal DP and PT pulses, no trophic changes or ulcerative lesions and normal sensory exam    Results for orders placed or performed in visit on 07/27/21 (from the past 24 hour(s))   HEMOGLOBIN A1C (BFP)   Result Value Ref Range    Hemoglobin A1C 8.8 (A) 4.0 - 7.0 %                 "

## 2021-09-05 ENCOUNTER — HEALTH MAINTENANCE LETTER (OUTPATIENT)
Age: 76
End: 2021-09-05

## 2021-09-16 ENCOUNTER — OFFICE VISIT (OUTPATIENT)
Dept: FAMILY MEDICINE | Facility: CLINIC | Age: 76
End: 2021-09-16

## 2021-09-16 VITALS
DIASTOLIC BLOOD PRESSURE: 76 MMHG | RESPIRATION RATE: 18 BRPM | HEART RATE: 72 BPM | OXYGEN SATURATION: 94 % | BODY MASS INDEX: 33.64 KG/M2 | SYSTOLIC BLOOD PRESSURE: 122 MMHG | WEIGHT: 262 LBS

## 2021-09-16 DIAGNOSIS — E78.2 MIXED HYPERLIPIDEMIA: ICD-10-CM

## 2021-09-16 DIAGNOSIS — I25.10 ATHEROSCLEROSIS OF CORONARY ARTERY OF NATIVE HEART WITHOUT ANGINA PECTORIS, UNSPECIFIED VESSEL OR LESION TYPE: Primary | ICD-10-CM

## 2021-09-16 DIAGNOSIS — E11.9 TYPE 2 DIABETES MELLITUS WITHOUT COMPLICATION, WITHOUT LONG-TERM CURRENT USE OF INSULIN (H): ICD-10-CM

## 2021-09-16 DIAGNOSIS — I10 ESSENTIAL HYPERTENSION: ICD-10-CM

## 2021-09-16 PROCEDURE — 99214 OFFICE O/P EST MOD 30 MIN: CPT | Performed by: FAMILY MEDICINE

## 2021-09-16 RX ORDER — ROSUVASTATIN CALCIUM 20 MG/1
20 TABLET, COATED ORAL DAILY
COMMUNITY

## 2021-09-16 RX ORDER — LOSARTAN POTASSIUM 25 MG/1
25 TABLET ORAL
COMMUNITY
Start: 2021-09-10 | End: 2022-03-08

## 2021-09-16 NOTE — PROGRESS NOTES
Assessment & Plan     Atherosclerosis of coronary artery of native heart without angina pectoris, unspecified vessel or lesion type  Pt doing well,mild disease seen on angio, no further chest pain , pt has appropriate f/u set up    Mixed hyperlipidemia  Now on crestor, continue current medications at current doses     Essential hypertension  Well controlled, improved with addition losartan    Type 2 diabetes mellitus without complication, without long-term current use of insulin (H)  Well controlled, continue current medications at current doses       Review of external notes as documented elsewhere in note  Review of the result(s) of each unique test - labs  Ordering of each unique test  Prescription drug management         FUTURE APPOINTMENTS:       - Follow-up visit in 4 mo  Work on weight loss  Regular exercise    No follow-ups on file.    Lm Garnica MD  [facility]    Subjective   Gaetano is a 75 year old who presents for the following health issues     HPI       Hospital Follow-up Visit:    Hospital/Nursing Home/IP Rehab Facility: [facility]   Date of Admission: 9/10/2021  Date of Discharge: 9/10/2021  Reason(s) for Admission: Angiogram due to chest pain , max 30-40% stenosis, no need for stents      Was your hospitalization related to COVID-19? No   Problems taking medications regularly:  None  Medication changes since discharge: discontinued Lipitor, started Crestor and Cozaar  Problems adhering to non-medication therapy:  None    Summary of hospitalization:  CareEverywhere information obtained and reviewed  Diagnostic Tests/Treatments reviewed.  Follow up needed: cardiology  Other Healthcare Providers Involved in Patient s Care:         Specialist appointment - cardiology at [facility] in a few weeks  Update since discharge: improved. Post Discharge Medication Reconciliation: discharge medications reconciled, continue medications without change.  Plan of care communicated  with patient              Diabetes Follow-up      No changes in meds, sugars have been controlled                Hyperlipidemia [id number] from lipitor to crestor      Are you regularly taking any medication or supplement to lower your cholesterol?   Yes- now on crestor    Are you having muscle aches or other side effects that you think could be caused by your cholesterol lowering medication?  No    Hypertension Kcsiso-hj-kbcxcod on losartan      Do you check your blood pressure regularly outside of the clinic? Yes     Are you following a low salt diet? No    Are your blood pressures ever more than 140 on the top number (systolic) OR more   than 90 on the bottom number (diastolic), for example 140/90? No    BP Readings from Last 2 Encounters:   09/16/21 122/76   07/27/21 (!) 140/70     Hemoglobin A1C (%)   Date Value   07/27/2021 8.8 (A)   12/30/2020 8.2 (A)     LDL Cholesterol Direct (mg/dL)   Date Value   07/27/2021 67   12/30/2020 53         How many servings of fruits and vegetables do you eat daily?  2-3    On average, how many sweetened beverages do you drink each day (Examples: soda, juice, sweet tea, etc.  Do NOT count diet or artificially sweetened beverages)?   1    How many days per week do you exercise enough to make your heart beat faster? 4    How many minutes a day do you exercise enough to make your heart beat faster? 20 - 29    How many days per week do you miss taking your medication? 0      Review of Systems   Constitutional, HEENT, cardiovascular, pulmonary, gi and gu systems are negative, except as otherwise noted.      Objective    There were no vitals taken for this visit.  There is no height or weight on file to calculate BMI.  Physical Exam   GENERAL: healthy, alert and no distress  NECK: no adenopathy, no asymmetry, masses, or scars and thyroid normal to palpation  RESP: lungs clear to auscultation - no rales, rhonchi or wheezes  CV: regular rate and rhythm, normal S1 S2, no S3 or  S4, no murmur, click or rub, no peripheral edema and peripheral pulses strong  ABDOMEN: soft, nontender, no hepatosplenomegaly, no masses and bowel sounds normal  MS: no gross musculoskeletal defects noted, no edema  NEURO: Normal strength and tone, mentation intact and speech normal  PSYCH: mentation appears normal, affect normal/bright    Office Visit on 07/27/2021   Component Date Value Ref Range Status     Hemoglobin A1C 07/27/2021 8.8* 4.0 - 7.0 % Final     Cholesterol 07/27/2021 164  0 - 199 mg/dL Final     Triglycerides 07/27/2021 125  0 - 149 mg/dL Final     HDL Cholesterol 07/27/2021 72  40 - 150 mg/dL Final     LDL Cholesterol Direct 07/27/2021 67  0 - 130 mg/dL Final     Cholesterol/HDL Ratio 07/27/2021 2  0 - 5 Final     Carbon Dioxide 07/27/2021 31.2  20 - 32 mmol/L Final     Creatinine 07/27/2021 1.25  0.60 - 1.30 mg/dL Final     Glucose 07/27/2021 171* 60 - 99 mg/dL Final     Sodium 07/27/2021 141.4  135 - 146 mmol/L Final     Potassium 07/27/2021 4.60  3.5 - 5.3 mmol/L Final     Chloride 07/27/2021 103.8  98 - 110 mmol/L Final     Protein Total 07/27/2021 7.4  6.1 - 8.1 g/dL Final     Albumin 07/27/2021 4.6  3.6 - 5.1 g/dL Final     Alkaline Phosphatase 07/27/2021 65  33 - 130 U/L Final     ALT 07/27/2021 14  0 - 32 U/L Final     AST 07/27/2021 16  0 - 35 U/L Final     Bilirubin Total 07/27/2021 1.1  0.2 - 1.2 mg/dL Final     Urea Nitrogen 07/27/2021 29* 7 - 25 mg/dL Final     Calcium 07/27/2021 10.1  8.6 - 10.3 mg/dL Final     BUN/Creatinine Ratio 07/27/2021 23.2* 6 - 22 Final     Globulin Calculated 07/27/2021 2.8  1.9 - 3.7 Final     A/G Ratio 07/27/2021 1.6  1 - 2.5 Final

## 2021-10-31 ENCOUNTER — HEALTH MAINTENANCE LETTER (OUTPATIENT)
Age: 76
End: 2021-10-31

## 2022-01-25 ENCOUNTER — OFFICE VISIT (OUTPATIENT)
Dept: FAMILY MEDICINE | Facility: CLINIC | Age: 77
End: 2022-01-25

## 2022-01-25 VITALS
HEIGHT: 74 IN | HEART RATE: 60 BPM | BODY MASS INDEX: 34.43 KG/M2 | WEIGHT: 268.3 LBS | TEMPERATURE: 97.6 F | SYSTOLIC BLOOD PRESSURE: 108 MMHG | DIASTOLIC BLOOD PRESSURE: 64 MMHG

## 2022-01-25 DIAGNOSIS — I25.10 ATHEROSCLEROSIS OF CORONARY ARTERY OF NATIVE HEART WITHOUT ANGINA PECTORIS, UNSPECIFIED VESSEL OR LESION TYPE: ICD-10-CM

## 2022-01-25 DIAGNOSIS — E11.9 TYPE 2 DIABETES MELLITUS WITHOUT COMPLICATION, WITHOUT LONG-TERM CURRENT USE OF INSULIN (H): Primary | ICD-10-CM

## 2022-01-25 DIAGNOSIS — E78.2 MIXED HYPERLIPIDEMIA: ICD-10-CM

## 2022-01-25 DIAGNOSIS — I48.0 PAROXYSMAL ATRIAL FIBRILLATION (H): ICD-10-CM

## 2022-01-25 DIAGNOSIS — I10 ESSENTIAL HYPERTENSION: ICD-10-CM

## 2022-01-25 DIAGNOSIS — E03.9 HYPOTHYROIDISM, UNSPECIFIED TYPE: ICD-10-CM

## 2022-01-25 LAB
ALBUMIN (URINE) MG/L: 10
ALBUMIN SERPL-MCNC: 4.1 G/DL (ref 3.6–5.1)
ALBUMIN URINE MG/G CR: <30 MG/G CREATININE
ALBUMIN/GLOB SERPL: 1.4 {RATIO} (ref 1–2.5)
ALP SERPL-CCNC: 56 U/L (ref 33–130)
ALT 1742-6: 17 U/L (ref 0–32)
AST 1920-8: 13 U/L (ref 0–35)
BILIRUB SERPL-MCNC: 0.9 MG/DL (ref 0.2–1.2)
BUN SERPL-MCNC: 32 MG/DL (ref 7–25)
BUN/CREATININE RATIO: 24.4 (ref 6–22)
CALCIUM SERPL-MCNC: 9.5 MG/DL (ref 8.6–10.3)
CHLORIDE SERPLBLD-SCNC: 106.2 MMOL/L (ref 98–110)
CHOLEST SERPL-MCNC: 120 MG/DL (ref 0–199)
CHOLEST/HDLC SERPL: 2 {RATIO} (ref 0–5)
CO2 SERPL-SCNC: 29.1 MMOL/L (ref 20–32)
CREAT SERPL-MCNC: 1.27 MG/DL (ref 0.6–1.3)
CREATININE URINE MG/DL: 100 MG/DL
GLOBULIN, CALCULATED - QUEST: 2.9 (ref 1.9–3.7)
GLUCOSE SERPL-MCNC: 195 MG/DL (ref 60–99)
HBA1C MFR BLD: 9.1 % (ref 4–7)
HDLC SERPL-MCNC: 66 MG/DL (ref 40–150)
LDLC SERPL CALC-MCNC: 33 MG/DL (ref 0–130)
POTASSIUM SERPL-SCNC: 4.87 MMOL/L (ref 3.5–5.3)
PROT SERPL-MCNC: 7 G/DL (ref 6.1–8.1)
SODIUM SERPL-SCNC: 142 MMOL/L (ref 135–146)
TRIGL SERPL-MCNC: 104 MG/DL (ref 0–149)

## 2022-01-25 PROCEDURE — 82043 UR ALBUMIN QUANTITATIVE: CPT | Performed by: FAMILY MEDICINE

## 2022-01-25 PROCEDURE — 80061 LIPID PANEL: CPT | Performed by: FAMILY MEDICINE

## 2022-01-25 PROCEDURE — 36415 COLL VENOUS BLD VENIPUNCTURE: CPT | Performed by: FAMILY MEDICINE

## 2022-01-25 PROCEDURE — 99214 OFFICE O/P EST MOD 30 MIN: CPT | Performed by: FAMILY MEDICINE

## 2022-01-25 PROCEDURE — 82570 ASSAY OF URINE CREATININE: CPT | Performed by: FAMILY MEDICINE

## 2022-01-25 PROCEDURE — 80053 COMPREHEN METABOLIC PANEL: CPT | Performed by: FAMILY MEDICINE

## 2022-01-25 PROCEDURE — 83036 HEMOGLOBIN GLYCOSYLATED A1C: CPT | Performed by: FAMILY MEDICINE

## 2022-01-25 RX ORDER — GLIMEPIRIDE 4 MG/1
4 TABLET ORAL
Qty: 90 TABLET | Refills: 3 | Status: SHIPPED | OUTPATIENT
Start: 2022-01-25 | End: 2023-03-07

## 2022-01-25 RX ORDER — METOPROLOL SUCCINATE 25 MG/1
25 TABLET, EXTENDED RELEASE ORAL DAILY
COMMUNITY
Start: 2022-01-10

## 2022-01-25 RX ORDER — LEVOTHYROXINE SODIUM 50 UG/1
50 TABLET ORAL DAILY
Qty: 90 TABLET | Refills: 3 | Status: SHIPPED | OUTPATIENT
Start: 2022-01-25 | End: 2023-03-07

## 2022-01-25 RX ORDER — PIOGLITAZONEHYDROCHLORIDE 45 MG/1
45 TABLET ORAL DAILY
Qty: 90 TABLET | Refills: 3 | Status: SHIPPED | OUTPATIENT
Start: 2022-01-25 | End: 2023-03-07

## 2022-01-25 ASSESSMENT — MIFFLIN-ST. JEOR: SCORE: 2011.75

## 2022-01-25 NOTE — PROGRESS NOTES
"  Assessment & Plan     Type 2 diabetes mellitus without complication, without long-term current use of insulin (H)  Poor control, discussed options, pt would like to consider CGM-recommend he see MTM to discuss  - HEMOGLOBIN A1C (BFP)  - VENOUS COLLECTION    Mixed hyperlipidemia  Control uncertain, continue current medications at current doses pending labs    Essential hypertension  Well controlled, continue current medications at current doses     Atherosclerosis of coronary artery of native heart without angina pectoris, unspecified vessel or lesion type  stable symptomatically -seeing cardiology 2/22, last notes reviewed    Hypothyroidism, unspecified type  stable symptomatically continue current medications at current doses pending labs    Paroxysmal atrial fibrillation (H)  Pt was infoemed pacemaker picking up a fib, seeing cardiology 2/22, on Eliquis and toprol      Review of external notes as documented elsewhere in note  Review of the result(s) of each unique test - labs  Ordering of each unique test  Prescription drug management         BMI:   Estimated body mass index is 34.45 kg/m  as calculated from the following:    Height as of this encounter: 1.88 m (6' 2\").    Weight as of this encounter: 121.7 kg (268 lb 4.8 oz).   Weight management plan: Discussed healthy diet and exercise guidelines    FUTURE APPOINTMENTS:       - Follow-up visit in asap with MTM  Work on weight loss  Regular exercise    No follow-ups on file.    Lm Garnica MD  [facility]    Natalia Salter is a 76 year old who presents for the following health issues     HPI     Diabetes Follow-up-Actos, Metformin, Jardiance    How often are you checking your blood sugar? One time daily  What time of day are you checking your blood sugars (select all that apply)?  Before meals  120-180  Have you had any blood sugars above 200?  Yes a few  Have you had any blood sugars below 70?  No    What symptoms do you " notice when your blood sugar is low?  Rob    What concerns do you have today about your diabetes? None     Do you have any of these symptoms? (Select all that apply)  Numbness in feet    Have you had a diabetic eye exam in the last 12 months? No          Hyperlipidemia Follow-Up      Are you regularly taking any medication or supplement to lower your cholesterol?   Yes- rosuvastatin    Are you having muscle aches or other side effects that you think could be caused by your cholesterol lowering medication?  No    Hypertension Follow-up      Do you check your blood pressure regularly outside of the clinic? Yes     Are you following a low salt diet? No    Are your blood pressures ever more than 140 on the top number (systolic) OR more   than 90 on the bottom number (diastolic), for example 140/90? No    BP Readings from Last 2 Encounters:   01/25/22 108/64   09/16/21 122/76     Hemoglobin A1C POCT (%)   Date Value   07/27/2021 8.8 (A)   12/30/2020 8.2 (A)     LDL Cholesterol Direct (mg/dL)   Date Value   07/27/2021 67   12/30/2020 53       Vascular Disease Follow-up      How often do you take nitroglycerin? Never  Do you take an aspirin every day? No -on Eliquis      How many servings of fruits and vegetables do you eat daily?  2-3    On average, how many sweetened beverages do you drink each day (Examples: soda, juice, sweet tea, etc.  Do NOT count diet or artificially sweetened beverages)?   1    How many days per week do you exercise enough to make your heart beat faster? 3 or less    How many minutes a day do you exercise enough to make your heart beat faster? 20 - 29    How many days per week do you miss taking your medication? 0    Hypothyroidism Follow-up      Since last visit, patient describes the following symptoms: weight gain of 6 lbs        Review of Systems   Constitutional, HEENT, cardiovascular, pulmonary, gi and gu systems are negative, except as otherwise noted.      Objective    /64 (BP  "Location: Left arm, Patient Position: Chair, Cuff Size: Adult Large)   Pulse 60   Temp 97.6  F (36.4  C)   Ht 1.88 m (6' 2\")   Wt 121.7 kg (268 lb 4.8 oz)   BMI 34.45 kg/m    Body mass index is 34.45 kg/m .  Physical Exam   GENERAL: healthy, alert and no distress  EYES: Eyes grossly normal to inspection, PERRL and conjunctivae and sclerae normal  HENT: ear canals and TM's normal, nose and mouth without ulcers or lesions  NECK: no adenopathy, no asymmetry, masses, or scars and thyroid normal to palpation  RESP: lungs clear to auscultation - no rales, rhonchi or wheezes  CV: regular rate and rhythm, normal S1 S2, no S3 or S4, no murmur, click or rub, no peripheral edema and peripheral pulses strong  ABDOMEN: soft, nontender, no hepatosplenomegaly, no masses and bowel sounds normal  MS: no gross musculoskeletal defects noted, no edema  NEURO: Normal strength and tone, mentation intact and speech normal  PSYCH: mentation appears normal, affect normal/bright    Results for orders placed or performed in visit on 01/25/22 (from the past 24 hour(s))   HEMOGLOBIN A1C (BFP)   Result Value Ref Range    Hemoglobin A1C POCT 9.1 (A) 4.0 - 7.0 %                 "

## 2022-01-25 NOTE — NURSING NOTE
Gaetano CAN Sheriff is here for a diabetic check and medication refill.    Questioned patient about current smoking habits.  Pt. has never smoked.  Body mass index is 34.45 kg/m .  PULSE regular  My Chart: active  CLASSIFICATION OF OVERWEIGHT AND OBESITY BY BMI                        Obesity Class           BMI(kg/m2)  Underweight                                    < 18.5  Normal                                         18.5-24.9  Overweight                                     25.0-29.9  OBESITY                     I                  30.0-34.9                             II                 35.0-39.9  EXTREME OBESITY             III                >40                            Patient's  BMI Body mass index is 34.45 kg/m .  http://hin.nhlbi.nih.gov/menuplanner/menu.cgi    Pre-visit planning  Immunizations - up to date  Colonoscopy -   Mammogram -   Asthma -   PHQ9 -    SHERWIN-7 -    Hearing screen -

## 2022-01-26 LAB
T4, FREE, NON-DIALYSIS - QUEST: 1.4 NG/DL (ref 0.8–1.8)
TSH SERPL-ACNC: 5.11 MIU/L (ref 0.4–4.5)

## 2022-02-02 ENCOUNTER — OFFICE VISIT (OUTPATIENT)
Dept: FAMILY MEDICINE | Facility: CLINIC | Age: 77
End: 2022-02-02

## 2022-02-02 DIAGNOSIS — E11.9 TYPE 2 DIABETES MELLITUS WITHOUT COMPLICATION, WITHOUT LONG-TERM CURRENT USE OF INSULIN (H): Primary | ICD-10-CM

## 2022-02-02 RX ORDER — FLASH GLUCOSE SENSOR
KIT MISCELLANEOUS
Qty: 2 EACH | Refills: 11 | Status: SHIPPED | OUTPATIENT
Start: 2022-02-02 | End: 2023-08-03

## 2022-02-02 RX ORDER — FLASH GLUCOSE SENSOR
KIT MISCELLANEOUS
Qty: 2 EACH | Refills: 11 | Status: SHIPPED | OUTPATIENT
Start: 2022-02-02 | End: 2022-02-02

## 2022-02-02 NOTE — PROGRESS NOTES
SUBJECTIVE / OBJECTIVE:                                                Gaetano Sheriff is a 76 year old male seen for an initial visit for Medication Therapy Management.  He was referred to me by Dr. Lm Garnica.     REASON FOR MTM REFERRAL: CGM recommendation     PATIENT CHIEF COMPLAINT/CONCERN: CGM recommendation    PAST MEDICAL HISTORY: reviewed in chart    MEDICAL CONDITIONS REVIEWED:    diabetes mellitus-type 2      Current Outpatient Medications   Medication Sig Dispense Refill     Continuous Blood Gluc Sensor (FREESTYLE ABBI 14 DAY SENSOR) MISC Change every 14 days. 2 each 11     ACCU-CHEK MICHAEL PLUS test strip 1 strip by In Vitro route daily 100 strip 3     apixaban ANTICOAGULANT (ELIQUIS) 5 MG tablet Take 5 mg by mouth 2 times daily       empagliflozin (JARDIANCE) 25 MG TABS tablet Take 1 tablet (25 mg) by mouth daily 90 tablet 3     glimepiride (AMARYL) 4 MG tablet Take 1 tablet (4 mg) by mouth daily (with breakfast) 90 tablet 3     levothyroxine (SYNTHROID/LEVOTHROID) 50 MCG tablet Take 1 tablet (50 mcg) by mouth daily 90 tablet 3     losartan (COZAAR) 25 MG tablet Take 25 mg by mouth       metFORMIN (GLUCOPHAGE) 500 MG tablet TAKE 2 TABLETS BY MOUTH TWICE DAILY WITH MEALS 360 tablet 3     metoprolol succinate ER (TOPROL-XL) 25 MG 24 hr tablet Take 25 mg by mouth daily       nitroGLYcerin (NITROSTAT) 0.4 MG sublingual tablet nitroglycerin 0.4 mg sublingual tablet       pioglitazone (ACTOS) 45 MG tablet Take 1 tablet (45 mg) by mouth daily 90 tablet 3     rosuvastatin (CRESTOR) 20 MG tablet Take 20 mg by mouth daily         Current labs include:  BP Readings from Last 3 Encounters:   01/25/22 108/64   09/16/21 122/76   07/27/21 (!) 140/70       There were no vitals taken for this visit.    Most Recent Immunizations   Administered Date(s) Administered     COVID-19,PF,Pfizer (12+ Yrs) 09/27/2021     Influenza (High Dose) 3 valent vaccine 10/18/2016     Influenza Quad, Recombinant, pf(RIV4)  (Flublok) 02/18/2020     Influenza, Quad, High Dose, Pf, 65yr+ (Fluzone HD) 09/15/2021     Pneumo Conj 13-V (2010&after) 12/05/2016     Pneumococcal 23 valent 12/31/2018     Tdap (Adacel,boostrix) 04/02/2019       ASSESSMENT / PLAN:                                                       diabetes:  Assessment: Patient has seen increase in BG levels and A1c. Patient has decreased exercise due to breaking his leg last year. He notes that he is planning to reinitiate exercise into regimen, and after last A1c understands the importance of this more.     He is interested in starting a CGM due to his sisters success with the Freestyle Mendel sensors. We discussed the benefits of each Freestyle Mendel and Dexcom, and patient was more inclined to try Mendel.  Status: A1c not at goal  Drug Therapy Problems:  1) Interested in CGM  2) Discussed potential initiation of GLP1-RA  Plan:  1) Sent in Freestyle Mendel and will return to clinic this afternoon to place first sensor. Patient setting up guero in meantime.  2) I would like to see what success Gaetano has with the CGM before changing any therapy. I do think that a GLP1-RA may be a good choice for Gaetano however with his cardiac history. When we get established with CGM readings and exercise regimen, we will revisit this.      I spent 30 minutes with this patient today.  All changes were made via collaborative practice agreement with Lm Garnica. A copy of the visit note was provided to the patient's primary care provider.    Sol Escobar, PharmD  Medication Therapy Management Pharmacist  [facility]  Office Phone: [phone]

## 2022-02-10 ENCOUNTER — OFFICE VISIT (OUTPATIENT)
Dept: FAMILY MEDICINE | Facility: CLINIC | Age: 77
End: 2022-02-10

## 2022-02-10 DIAGNOSIS — E11.9 TYPE 2 DIABETES MELLITUS WITHOUT COMPLICATION, WITHOUT LONG-TERM CURRENT USE OF INSULIN (H): Primary | ICD-10-CM

## 2022-02-10 NOTE — PROGRESS NOTES
It was a pleasure seeing you again today.    PLAN DETAILS:  1) Placed Mendel sensor on left arm. Connected sensor to phone guero. Set up connection to clinic.   2) Discussed diet, specifically carbohydrate consumption. Gave patient handout on carb counting and carbohydrate content in foods.    FOLLOW-UP VISIT DETAILS:  Patient will follow up in 1-2 months to let us know how Mendel is working. If results are seen, consider trying PA for Mendel. At this time, consider adding GLP-1 RA if still needing help with blood glucose control.    If you have any questions or concerns, please feel free to contact me.  If I do not answer my phone, leave a message and I will return your call as soon as I can.    Best Regards,    Sol Escobar, PharmD  Clinical Pharmacist  [facility]  General Clinic Phone: [phone]  Direct Office Phone: [phone]

## 2022-02-17 DIAGNOSIS — E11.9 TYPE 2 DIABETES MELLITUS WITHOUT COMPLICATION, WITHOUT LONG-TERM CURRENT USE OF INSULIN (H): Primary | ICD-10-CM

## 2022-02-17 RX ORDER — SEMAGLUTIDE 1.34 MG/ML
0.25 INJECTION, SOLUTION SUBCUTANEOUS
Qty: 1.5 ML | Refills: 0 | Status: SHIPPED | OUTPATIENT
Start: 2022-02-17 | End: 2022-02-18

## 2022-02-17 NOTE — PROGRESS NOTES
SUBJECTIVE/OBJECTIVE:                Gaetano Sheriff is a 76 year old male seen for a follow-up visit for Medication Management Services.  He was referred to me from Dr. Lm Garnica.     Chief Complaint: Follow up from [facility] visit on 02/02/22.     diabetes:  Current Medications:  Current Outpatient Medications   Medication     semaglutide (OZEMPIC, 0.25 OR 0.5 MG/DOSE,) 2 MG/1.5ML SOPN pen     ACCU-CHEK MICHAEL PLUS test strip     apixaban ANTICOAGULANT (ELIQUIS) 5 MG tablet     Continuous Blood Gluc Sensor (FREESTYLE MENDEL 14 DAY SENSOR) MISC     Continuous Blood Gluc Sensor (FREESTYLE MENDEL 14 DAY SENSOR) MISC     empagliflozin (JARDIANCE) 25 MG TABS tablet     glimepiride (AMARYL) 4 MG tablet     levothyroxine (SYNTHROID/LEVOTHROID) 50 MCG tablet     losartan (COZAAR) 25 MG tablet     metFORMIN (GLUCOPHAGE) 500 MG tablet     metoprolol succinate ER (TOPROL-XL) 25 MG 24 hr tablet     nitroGLYcerin (NITROSTAT) 0.4 MG sublingual tablet     pioglitazone (ACTOS) 45 MG tablet     rosuvastatin (CRESTOR) 20 MG tablet     No current facility-administered medications for this visit.     We discussed the benefits and risks of each medication.  Patient Questions/Concerns:  Patient interested in starting GLP-1 RA after monitoring with Mendel for a week. Also would like to have a consult with a nutritionist to help with his diabetic diet.        ASSESSMENT/PLAN:                diabetes:  Assessment: Mendel sensor was placed last Thursday. Patient has tried to minimize carbohydrate options in diet and still is struggling with baseline BG. We will add Ozempic to his regimen today. Gaetano is interested in meeting with a nutritionist to help him and his wife learn more about what substitutions would be the best in their diet and any tips to help manage his diabetes better.  Status: Type 2 diabetes uncontrolled  Drug Therapy Problems:  1) Additional medication needed  2) Diet help  Plan:  1) Adding Ozempic 0.25mg  weekly  Demonstrated administration and discussed medication in clinic today  2) Nutrition consult      I spent 30 minutes with this patient today.  All changes were made via collaborative practice agreement with Lm Garnica. A copy of the visit note was provided to the patient's primary care pro    Sol Escobar, PharmD  Clinical Pharmacist  [facility] Phone: [phone]  Direct Office Phone: [phone]

## 2022-02-18 ENCOUNTER — TELEPHONE (OUTPATIENT)
Dept: FAMILY MEDICINE | Facility: CLINIC | Age: 77
End: 2022-02-18

## 2022-02-18 DIAGNOSIS — E11.9 TYPE 2 DIABETES MELLITUS WITHOUT COMPLICATION, WITHOUT LONG-TERM CURRENT USE OF INSULIN (H): ICD-10-CM

## 2022-02-18 RX ORDER — SEMAGLUTIDE 1.34 MG/ML
0.25 INJECTION, SOLUTION SUBCUTANEOUS
Qty: 1.5 ML | Refills: 0 | Status: SHIPPED | OUTPATIENT
Start: 2022-02-18 | End: 2022-04-19

## 2022-02-18 NOTE — TELEPHONE ENCOUNTER
Patient called in stating that he needed his Ozempic to go to the [facility] pharmacy in [place] and not the [facility]. He is asking for this to be sent in today to [facility] so that he is able to pick it up. Routing to Dr. Garnica to send in.

## 2022-02-24 ENCOUNTER — OFFICE VISIT (OUTPATIENT)
Dept: FAMILY MEDICINE | Facility: CLINIC | Age: 77
End: 2022-02-24

## 2022-02-24 DIAGNOSIS — E11.9 TYPE 2 DIABETES MELLITUS WITHOUT COMPLICATION, WITHOUT LONG-TERM CURRENT USE OF INSULIN (H): Primary | ICD-10-CM

## 2022-02-24 NOTE — PROGRESS NOTES
Patient came to clinic today to have demonstration of Freestyle Mendel removal and was able to put new sensor on independently.    Patient also brought Ozempic to do first injection and was able to complete first injection by himself.     Patient has been working on diet control and minimizing eating out, and his numbers have been improving with these changes. The past week he was in target range about 70% of time. His fasting readings are still slightly elevated, and he peaks daily late morning, typically around 220s. His evening numbers are better controlled.    Gaetano will check in with us in about a month to let us know how the Ozempic is working.    Sol Escobar, PharmD  Clinical Pharmacist  [facility]  General Clinic Phone: [phone]  Direct Office Phone: [phone]

## 2022-02-24 NOTE — Clinical Note
Education today on removal of Mendel and was able to reapply independently. Also completed first injection of Ozempic in clinic today. Numbers have been improving since on Mendel, and we will continue to monitor.

## 2022-03-08 ENCOUNTER — OFFICE VISIT (OUTPATIENT)
Dept: FAMILY MEDICINE | Facility: CLINIC | Age: 77
End: 2022-03-08

## 2022-03-08 VITALS
TEMPERATURE: 97.6 F | HEIGHT: 74 IN | SYSTOLIC BLOOD PRESSURE: 102 MMHG | OXYGEN SATURATION: 97 % | HEART RATE: 51 BPM | DIASTOLIC BLOOD PRESSURE: 68 MMHG | WEIGHT: 268 LBS | BODY MASS INDEX: 34.39 KG/M2

## 2022-03-08 DIAGNOSIS — M79.641 PAIN IN BOTH HANDS: ICD-10-CM

## 2022-03-08 DIAGNOSIS — M79.642 PAIN IN BOTH HANDS: ICD-10-CM

## 2022-03-08 DIAGNOSIS — M79.89 SWELLING OF BOTH HANDS: Primary | ICD-10-CM

## 2022-03-08 LAB
% GRANULOCYTES: 60.3 %
ERYTHROCYTE [SEDIMENTATION RATE] IN BLOOD: 30 MM/HR (ref 0–20)
HCT VFR BLD AUTO: 43.8 % (ref 40–53)
HEMOGLOBIN: 14.6 G/DL (ref 13.3–17.7)
LYMPHOCYTES NFR BLD AUTO: 31 %
MCH RBC QN AUTO: 26.7 PG (ref 26–33)
MCHC RBC AUTO-ENTMCNC: 33.3 G/DL (ref 31–36)
MCV RBC AUTO: 80.3 FL (ref 78–100)
MONOCYTES NFR BLD AUTO: 8.7 %
PLATELET COUNT - QUEST: 401 10^9/L (ref 150–375)
RBC # BLD AUTO: 5.46 10*12/L (ref 4.4–5.9)
WBC # BLD AUTO: 11.1 10*9/L (ref 4–11)

## 2022-03-08 PROCEDURE — 85651 RBC SED RATE NONAUTOMATED: CPT | Performed by: FAMILY MEDICINE

## 2022-03-08 PROCEDURE — 99213 OFFICE O/P EST LOW 20 MIN: CPT | Performed by: FAMILY MEDICINE

## 2022-03-08 PROCEDURE — 85025 COMPLETE CBC W/AUTO DIFF WBC: CPT | Performed by: FAMILY MEDICINE

## 2022-03-08 RX ORDER — LOSARTAN POTASSIUM 50 MG/1
50 TABLET ORAL DAILY
COMMUNITY
Start: 2022-02-11

## 2022-03-08 NOTE — PROGRESS NOTES
"SUBJECTIVE:  Gaetano Sheriff, a 76 year old male scheduled an appointment to discuss the following issues:     Pain in finger of both hands  Finger swelling  PT noting increasing pain and swelling in both hands for 4 months. Right worse than left. Pt notes \"lumps\" in palm-feels he can't close fist now, difficulty with lid opening    Pt describes intermittent burning pain sensation in palm.    He denies other joint involvement.  No dry mouth, dry eyes.  No fevers or weight loss    No hx gout , sarcoid, TB, autoimmune issues      Medical, social, surgical, and family histories reviewed.    Patient Active Problem List   Diagnosis     Acquired hypothyroidism     Acute diverticulitis     Atherosclerotic heart disease     Essential hypertension     Mixed hyperlipidemia     BMI 30.0-30.9,adult     Presence of cardiac pacemaker     Uncontrolled type 2 diabetes mellitus with hyperglycemia (H)     Type 2 diabetes mellitus without complication, without long-term current use of insulin (H)     Health Care Home     ACP (advance care planning)     Syncope     Shoulder joint pain     Past Medical History:   Diagnosis Date     Acquired hypothyroidism      Arrhythmia      Atherosclerotic heart disease      Chronic infection      Diabetes mellitus (H)     type 2     Essential hypertension      Hyperlipidemia      Hypertension      Mixed hyperlipidemia      SSS (sick sinus syndrome) (H)      Syncope 2001    PPM placed     Type 2 diabetes mellitus without complication, without long-term current use of insulin (H) 04/02/2020     Uncontrolled type 2 diabetes mellitus with hyperglycemia (H) 01/10/2019    A1c on 8/31/20 was 8.4     Family History   Problem Relation Age of Onset     Diabetes Father      Diabetes Sister      Diabetes Brother      Coronary Artery Disease No family hx of      Cerebrovascular Disease No family hx of      Colon Cancer No family hx of      Prostate Cancer No family hx of      Social History     Socioeconomic " History     Marital status:      Spouse name: Va     Number of children: Not on file     Years of education: Not on file     Highest education level: Not on file   Occupational History     Not on file   Tobacco Use     Smoking status: Never Smoker     Smokeless tobacco: Never Used   Substance and Sexual Activity     Alcohol use: Yes     Comment: Occas     Drug use: Never     Sexual activity: Not on file   Other Topics Concern     Parent/sibling w/ CABG, MI or angioplasty before 65F 55M? Not Asked   Social History Narrative     Not on file     Social Determinants of Health     Financial Resource Strain: Not on file   Food Insecurity: Not on file   Transportation Needs: Not on file   Physical Activity: Not on file   Stress: Not on file   Social Connections: Not on file   Intimate Partner Violence: Not on file   Housing Stability: Not on file     Past Surgical History:   Procedure Laterality Date     IMPLANT PACEMAKER  2001    Placed a [facility]     OPEN REDUCTION INTERNAL FIXATION ANKLE Left 4/6/2020    Procedure: Open reduction internal fixation left bimalleolar ankle fracture;  Surgeon: Kaz Apple MD;  Location: [facility] OR     ORTHOPEDIC SURGERY Left     fracture repaired     REPLACE PACEMAKER GENERATOR  2010     ACCU-CHEK MICHAEL PLUS test strip, 1 strip by In Vitro route daily  apixaban ANTICOAGULANT (ELIQUIS) 5 MG tablet, Take 5 mg by mouth 2 times daily  Continuous Blood Gluc Sensor (FREESTYLE ABBI 14 DAY SENSOR) MISC, Change every 14 days.  Continuous Blood Gluc Sensor (FREESTYLE ABBI 14 DAY SENSOR) MISC, Change every 14 days.  empagliflozin (JARDIANCE) 25 MG TABS tablet, Take 1 tablet (25 mg) by mouth daily  glimepiride (AMARYL) 4 MG tablet, Take 1 tablet (4 mg) by mouth daily (with breakfast)  levothyroxine (SYNTHROID/LEVOTHROID) 50 MCG tablet, Take 1 tablet (50 mcg) by mouth daily  losartan (COZAAR) 50 MG tablet, Take 50 mg by mouth daily  metFORMIN (GLUCOPHAGE) 500 MG tablet, TAKE  "2 TABLETS BY MOUTH TWICE DAILY WITH MEALS  metoprolol succinate ER (TOPROL-XL) 25 MG 24 hr tablet, Take 25 mg by mouth daily  nitroGLYcerin (NITROSTAT) 0.4 MG sublingual tablet, nitroglycerin 0.4 mg sublingual tablet  pioglitazone (ACTOS) 45 MG tablet, Take 1 tablet (45 mg) by mouth daily  rosuvastatin (CRESTOR) 20 MG tablet, Take 20 mg by mouth daily  semaglutide (OZEMPIC, 0.25 OR 0.5 MG/DOSE,) 2 MG/1.5ML SOPN pen, Inject 0.25 mg Subcutaneous every 7 days    No current facility-administered medications on file prior to visit.       Allergies: Patient has no known allergies.    Immunization History   Administered Date(s) Administered     COVID-19,PF,Pfizer (12+ Yrs) 02/03/2021, 02/24/2021, 09/27/2021     Influenza (High Dose) 3 valent vaccine 10/18/2016     Influenza Quad, Recombinant, pf(RIV4) (Flublok) 12/31/2018, 02/18/2020     Influenza, Quad, High Dose, Pf, 65yr+ (Fluzone HD) 10/06/2020, 09/15/2021     Pneumo Conj 13-V (2010&after) 12/05/2016     Pneumococcal 23 valent 12/31/2018     Tdap (Adacel,boostrix) 04/02/2019        ROS:  CONSTITUTIONAL: NEGATIVE for fever, chills  EYES: NEGATIVE for vision changes   RESP: NEGATIVE for significant cough or SOB  CV: NEGATIVE for chest pain, palpitations   GI: NEGATIVE for nausea, abdominal pain, heartburn, or change in bowel habits  : NEGATIVE for frequency, dysuria, or hematuria  NEURO: NEGATIVE for weakness, dizziness or paresthesias or headache    OBJECTIVE:  /68 (BP Location: Right arm, Patient Position: Sitting, Cuff Size: Adult Large)   Pulse 51   Temp 97.6  F (36.4  C) (Temporal)   Ht 1.88 m (6' 2\")   Wt 121.6 kg (268 lb)   SpO2 97%   BMI 34.41 kg/m    EXAM:  GENERAL APPEARANCE: healthy, alert and no distress    RESP: lungs clear to auscultation - no rales, rhonchi or wheezes  CV: regular rates and rhythm, normal S1 S2, no S3 or S4 and no murmur, click or rub -    MS: pt with notable swelling of both hands diffusely, some possible contractures on " palms, mild TTP palms   SKIN: as above  NEURO: Normal strength and tone, sensory exam grossly normal, mentation intact and speech normal    ASSESSMENT/PLAN:  (M79.89) Swelling of both hands  (primary encounter diagnosis)  Comment: pt with apparent inflammatory dactylitis issue- no other symptoms to suggest etiology- no signs infection,  will check inflammatory markers    ddx OA/RA, gout, sarcoid, TB-suspect inflammatory arthropathy  Plan: ESR WESTERGREN (BFP), HEMOGRAM PLATELET DIFF         (BFP), RHEUMATOID FACTOR ([facility]), SANDER Scrn Rflx        to [facility] Ptr[facility] ([facility])        await labs, could consider steroids vs ortho hand eval pending results    (M79.641,  M79.642) Pain in both hands  Comment: as above  Plan: ESR WESTERGREN (BFP), HEMOGRAM PLATELET DIFF         (BFP), RHEUMATOID FACTOR ([facility]), SANDER Scrn Rflx        to [facility] ([facility])

## 2022-03-08 NOTE — NURSING NOTE
"Chief Complaint   Patient presents with     Hand Pain     swelling on the palm of right hand, 3 small bumps on palm of hand, feels like a \"burning\" sensation      Finger     all fingers are swollen, hard to bend fingers      Pre-visit Screening:  Immunizations:  up to date  Colonoscopy:  is up to date  Mammogram: NA  Asthma Action Test/Plan:  NA  PHQ9:  NA  GAD7:  NA  Questioned patient about current smoking habits Pt. has never smoked.  Ok to leave detailed message on voice mail for today's visit only Yes, phone # [phone]      "

## 2022-03-09 LAB
ANA SCREEN - QUEST: NEGATIVE
RHEUMATOID FACT SER NEPH-ACNC: <14 IU/ML (ref 0–20)

## 2022-03-30 ENCOUNTER — ALLIED HEALTH/NURSE VISIT (OUTPATIENT)
Dept: EDUCATION SERVICES | Facility: CLINIC | Age: 77
End: 2022-03-30
Payer: MEDICARE

## 2022-03-30 DIAGNOSIS — E11.9 TYPE 2 DIABETES MELLITUS WITHOUT COMPLICATION, WITHOUT LONG-TERM CURRENT USE OF INSULIN (H): ICD-10-CM

## 2022-03-30 PROCEDURE — G0108 DIAB MANAGE TRN  PER INDIV: HCPCS | Performed by: DIETITIAN, REGISTERED

## 2022-03-30 NOTE — PATIENT INSTRUCTIONS
MY DIABETES TODAY:    1)  Goal A1C is under <7.0  Mine is:      Lab Results   Component Value Date    A1C 9.1 01/25/2022       2)  Goal LDL (bad cholesterol) under 100  (measured at least yearly)- I am currently at:   Lab Results   Component Value Date    LDL 33 01/25/2022       3)  Goal blood pressure under 130/80- mine was Data Unavailable today    Care Plan:  Meal Plan Recommendation: eat 3 meals a day, have small snacks between meals, if needed, use portion control and Aim for 3-4 carb servings at meals and 0-1 carb servings at snacks  Exercise / activity plan: As able aim for 20-30 min daily  Check blood sugars 4+ times daily with Mendel CGM    Follow up:  Call ([phone]), e-mail ([email or web address]), or send MyChart message with questions, concerns or if follow-up is needed.  Follow-up for annual diabetes education review in 1 year or sooner, if needed.     Bring blood glucose meter and logbook with you to all doctor and follow-up appointments.     [place] [facility] Nutrition Services for the [facility] Area:  For Your Diabetes or Nutrition Education Appointments Call:  [phone]   For Diabetes or Nutrition Related Questions:   [phone]  Send MyChart Message   If you need a medication refill please contact your pharmacy. Please allow 3 business days for your refills to be completed.

## 2022-03-30 NOTE — PROGRESS NOTES
"Diabetes Self-Management Education & Support    Presents for: Individual review    SUBJECTIVE/OBJECTIVE:  Presents for: Individual review  Accompanied by: Self, Spouse  Diabetes education in the past 24mo: No  Focus of Visit: Healthy Eating, Assistance w/ making life changes  Diabetes type: Type 2  Disease course: Improving  How confident are you filling out medical forms by yourself:: Extremely  Transportation concerns: No  Other concerns:: None  Cultural Influences/Ethnic Background:  Not  or       Diabetes Symptoms & Complications:  Fatigue: Sometimes  Neuropathy: No  Polydipsia: Sometimes  Polyphagia: No  Polyuria: Sometimes  Visual change: No  Slow healing wounds: No  Weight trend: Decreasing  Complications assessed today?: No  Autonomic neuropathy: No  CVA: No  Heart disease: Yes  Nephropathy: No  Retinopathy: No  Sexual dysfunction: Yes    Patient Problem List and Family Medical History reviewed for relevant medical history, current medical status, and diabetes risk factors.    Vitals:  There were no vitals taken for this visit.  Estimated body mass index is 34.41 kg/m  as calculated from the following:    Height as of 3/8/22: 1.88 m (6' 2\").    Weight as of 3/8/22: 121.6 kg (268 lb).   Last 3 BP:   BP Readings from Last 3 Encounters:   03/08/22 102/68   01/25/22 108/64   09/16/21 122/76       History   Smoking Status     Never Smoker   Smokeless Tobacco     Never Used       Labs:  Lab Results   Component Value Date    A1C 9.1 01/25/2022     Lab Results   Component Value Date     01/25/2022     Lab Results   Component Value Date    LDL 33 01/25/2022     HDL Cholesterol   Date Value Ref Range Status   01/25/2022 66 40 - 150 mg/dL Final   ]  GFR Estimate   Date Value Ref Range Status   08/31/2020 59 (A) >60 ml/min/1.73m2 Final     No results found for: GFRESTBLACK  Lab Results   Component Value Date    CR 1.27 01/25/2022     No results found for: MICROALBUMIN    Healthy Eating:  Healthy " Eating Assessed Today: Yes  Cultural/[place] diet restrictions?: No  Meal planning/habits: Smaller portions  How many times a week on average do you eat food made away from home (restaurant/take-out)?: 3  Meals include: Breakfast, Lunch, Dinner  Breakfast: english muffin w pbj, milk, juice  Lunch: soup, crackers, milk OR burger, small fries, small coke OR 1 toast, milk, 1/2 cupcake  Dinner: bbq ribs, fries OR chow mein OR  Snacks: cookie  Beverages: Water, Coffee, Milk, Juice, Alcohol  Has patient met with a dietitian in the past?: No    Being Active:  Being Active Assessed Today: No  Exercise:: Currently not exercising  Barrier to exercise: None    Monitoring:  Monitoring Assessed Today: Yes  Blood Glucose Meter: FreeStyle  Times checking blood sugar at home (number): 5+  Times checking blood sugar at home (per): Day  Blood glucose trend: Decreasing        Taking Medications:  Diabetes Medication(s)     Biguanides       metFORMIN (GLUCOPHAGE) 500 MG tablet    TAKE 2 TABLETS BY MOUTH TWICE DAILY WITH MEALS    Sodium-Glucose Co-Transporter 2 (SGLT2) Inhibitors       empagliflozin (JARDIANCE) 25 MG TABS tablet    Take 1 tablet (25 mg) by mouth daily    Sulfonylureas       glimepiride (AMARYL) 4 MG tablet    Take 1 tablet (4 mg) by mouth daily (with breakfast)    Insulin Sensitizing Agents       pioglitazone (ACTOS) 45 MG tablet    Take 1 tablet (45 mg) by mouth daily    Incretin Mimetic Agents (GLP-1 Receptor Agonists)       semaglutide (OZEMPIC, 0.25 OR 0.5 MG/DOSE,) 2 MG/1.5ML SOPN pen    Inject 0.25 mg Subcutaneous every 7 days          Taking Medication Assessed Today: Yes  Current Treatments: Non-insulin Injectables, Oral Medication (taken by mouth)  Problems taking diabetes medications regularly?: No  Diabetes medication side effects?: No    Problem Solving:  Problem Solving Assessed Today: Yes  Is the patient at risk for hypoglycemia?: Yes  Hypoglycemia Frequency: Weekly  Hypoglycemia Treatment:  (pop)               Reducing Risks:  Reducing Risks Assessed Today: No  CAD Risks: Diabetes Mellitus, Family history, Obesity, Sedentary lifestyle  Has dilated eye exam at least once a year?: Yes  Sees dentist every 6 months?: Yes  Feet checked by healthcare provider in the last year?: Yes    Healthy Coping:  Healthy Coping Assessed Today: Yes  Emotional response to diabetes: Ready to learn  Informal Support system:: Spouse  Stage of change: ACTION (Actively working towards change)  Patient Activation Measure Survey Score:  No flowsheet data found.    Diabetes knowledge and skills assessment:   Patient is knowledgeable in diabetes management concepts related to: Monitoring, Taking Medication and Problem Solving    Patient needs further education on the following diabetes management concepts: Healthy Eating and Problem Solving    Based on learning assessment above, most appropriate setting for further diabetes education would be: Individual setting.      INTERVENTIONS:    Education provided today on:  AADE Self-Care Behaviors:  Healthy Eating: carbohydrate counting, consistency in amount, composition, and timing of food intake, weight reduction, portion control and plate planning method  Problem Solving: high blood glucose - causes, signs/symptoms, treatment and prevention and low blood glucose - causes, signs/symptoms, treatment and prevention    Opportunities for ongoing education and support in diabetes-self management were discussed.    Pt verbalized understanding of concepts discussed and recommendations provided today.       Education Materials Provided:   [facility] Understanding Diabetes Booklet      ASSESSMENT:  Patient with questions about healthy diet for diabetes.  He is pleased with progress in lowering glucose with CGM alone.  He reports consuming fewer sweets as he sees how these affect his glucose level.    Provided education both on plate planning method and carb counting.  Recommended 3-4 carb choices  per meal.  Reviewed typical intake and how to count the carbs.  Reviewed CGM report and identified foods that are causing hyperglycemia.    Congratulated patient on his progress in improving his diabetes!    Patient's most recent   Lab Results   Component Value Date    A1C 9.1 01/25/2022    is not meeting goal of <7.0    PLAN  See Patient Instructions for co-developed, patient-stated behavior change goals.  AVS printed and provided to patient today. See Follow-Up section for recommended follow-up.    JONH Grenee [facility]    Time Spent: 30 minutes  Encounter Type: Individual    Any diabetes medication dose changes were made via the CDE Protocol and Collaborative Practice Agreement with the patient's referring provider. A copy of this encounter was shared with the provider.

## 2022-03-30 NOTE — LETTER
"    3/30/2022         RE: Gaetano Sheriff  [address]   [place] [address]        Dear Colleague,    Thank you for referring your patient, Gaetano Sheriff, to the [facility]. Please see a copy of my visit note below.    Diabetes Self-Management Education & Support    Presents for: Individual review    SUBJECTIVE/OBJECTIVE:  Presents for: Individual review  Accompanied by: Self, Spouse  Diabetes education in the past 24mo: No  Focus of Visit: Healthy Eating, Assistance w/ making life changes  Diabetes type: Type 2  Disease course: Improving  How confident are you filling out medical forms by yourself:: Extremely  Transportation concerns: No  Other concerns:: None  Cultural Influences/Ethnic Background:  Not  or       Diabetes Symptoms & Complications:  Fatigue: Sometimes  Neuropathy: No  Polydipsia: Sometimes  Polyphagia: No  Polyuria: Sometimes  Visual change: No  Slow healing wounds: No  Weight trend: Decreasing  Complications assessed today?: No  Autonomic neuropathy: No  CVA: No  Heart disease: Yes  Nephropathy: No  Retinopathy: No  Sexual dysfunction: Yes    Patient Problem List and Family Medical History reviewed for relevant medical history, current medical status, and diabetes risk factors.    Vitals:  There were no vitals taken for this visit.  Estimated body mass index is 34.41 kg/m  as calculated from the following:    Height as of 3/8/22: 1.88 m (6' 2\").    Weight as of 3/8/22: 121.6 kg (268 lb).   Last 3 BP:   BP Readings from Last 3 Encounters:   03/08/22 102/68   01/25/22 108/64   09/16/21 122/76       History   Smoking Status     Never Smoker   Smokeless Tobacco     Never Used       Labs:  Lab Results   Component Value Date    A1C 9.1 01/25/2022     Lab Results   Component Value Date     01/25/2022     Lab Results   Component Value Date    LDL 33 01/25/2022     HDL Cholesterol   Date Value Ref Range Status   01/25/2022 66 40 - 150 mg/dL Final "   ]  GFR Estimate   Date Value Ref Range Status   08/31/2020 59 (A) >60 ml/min/1.73m2 Final     No results found for: GFRESTBLACK  Lab Results   Component Value Date    CR 1.27 01/25/2022     No results found for: MICROALBUMIN    Healthy Eating:  Healthy Eating Assessed Today: Yes  Cultural/Latter-day diet restrictions?: No  Meal planning/habits: Smaller portions  How many times a week on average do you eat food made away from home (restaurant/take-out)?: 3  Meals include: Breakfast, Lunch, Dinner  Breakfast: english muffin w pbj, milk, juice  Lunch: soup, crackers, milk OR burger, small fries, small coke OR 1 toast, milk, 1/2 cupcake  Dinner: bbq ribs, fries OR chow mein OR  Snacks: cookie  Beverages: Water, Coffee, Milk, Juice, Alcohol  Has patient met with a dietitian in the past?: No    Being Active:  Being Active Assessed Today: No  Exercise:: Currently not exercising  Barrier to exercise: None    Monitoring:  Monitoring Assessed Today: Yes  Blood Glucose Meter: FreeStyle  Times checking blood sugar at home (number): 5+  Times checking blood sugar at home (per): Day  Blood glucose trend: Decreasing        Taking Medications:  Diabetes Medication(s)     Biguanides       metFORMIN (GLUCOPHAGE) 500 MG tablet    TAKE 2 TABLETS BY MOUTH TWICE DAILY WITH MEALS    Sodium-Glucose Co-Transporter 2 (SGLT2) Inhibitors       empagliflozin (JARDIANCE) 25 MG TABS tablet    Take 1 tablet (25 mg) by mouth daily    Sulfonylureas       glimepiride (AMARYL) 4 MG tablet    Take 1 tablet (4 mg) by mouth daily (with breakfast)    Insulin Sensitizing Agents       pioglitazone (ACTOS) 45 MG tablet    Take 1 tablet (45 mg) by mouth daily    Incretin Mimetic Agents (GLP-1 Receptor Agonists)       semaglutide (OZEMPIC, 0.25 OR 0.5 MG/DOSE,) 2 MG/1.5ML SOPN pen    Inject 0.25 mg Subcutaneous every 7 days          Taking Medication Assessed Today: Yes  Current Treatments: Non-insulin Injectables, Oral Medication (taken by mouth)  Problems  taking diabetes medications regularly?: No  Diabetes medication side effects?: No    Problem Solving:  Problem Solving Assessed Today: Yes  Is the patient at risk for hypoglycemia?: Yes  Hypoglycemia Frequency: Weekly  Hypoglycemia Treatment:  (pop)              Reducing Risks:  Reducing Risks Assessed Today: No  CAD Risks: Diabetes Mellitus, Family history, Obesity, Sedentary lifestyle  Has dilated eye exam at least once a year?: Yes  Sees dentist every 6 months?: Yes  Feet checked by healthcare provider in the last year?: Yes    Healthy Coping:  Healthy Coping Assessed Today: Yes  Emotional response to diabetes: Ready to learn  Informal Support system:: Spouse  Stage of change: ACTION (Actively working towards change)  Patient Activation Measure Survey Score:  No flowsheet data found.    Diabetes knowledge and skills assessment:   Patient is knowledgeable in diabetes management concepts related to: Monitoring, Taking Medication and Problem Solving    Patient needs further education on the following diabetes management concepts: Healthy Eating and Problem Solving    Based on learning assessment above, most appropriate setting for further diabetes education would be: Individual setting.      INTERVENTIONS:    Education provided today on:  AADE Self-Care Behaviors:  Healthy Eating: carbohydrate counting, consistency in amount, composition, and timing of food intake, weight reduction, portion control and plate planning method  Problem Solving: high blood glucose - causes, signs/symptoms, treatment and prevention and low blood glucose - causes, signs/symptoms, treatment and prevention    Opportunities for ongoing education and support in diabetes-self management were discussed.    Pt verbalized understanding of concepts discussed and recommendations provided today.       Education Materials Provided:  [facility] Understanding Diabetes Booklet      ASSESSMENT:  Patient with questions about healthy diet for  diabetes.  He is pleased with progress in lowering glucose with CGM alone.  He reports consuming fewer sweets as he sees how these affect his glucose level.    Provided education both on plate planning method and carb counting.  Recommended 3-4 carb choices per meal.  Reviewed typical intake and how to count the carbs.  Reviewed CGM report and identified foods that are causing hyperglycemia.    Congratulated patient on his progress in improving his diabetes!    Patient's most recent   Lab Results   Component Value Date    A1C 9.1 01/25/2022    is not meeting goal of <7.0    PLAN  See Patient Instructions for co-developed, patient-stated behavior change goals.  AVS printed and provided to patient today. See Follow-Up section for recommended follow-up.    JONH [facility]    Time Spent: 30 minutes  Encounter Type: Individual    Any diabetes medication dose changes were made via the CDE Protocol and Collaborative Practice Agreement with the patient's referring provider. A copy of this encounter was shared with the provider.

## 2022-03-31 ENCOUNTER — OFFICE VISIT (OUTPATIENT)
Dept: FAMILY MEDICINE | Facility: CLINIC | Age: 77
End: 2022-03-31

## 2022-03-31 DIAGNOSIS — E11.9 TYPE 2 DIABETES MELLITUS WITHOUT COMPLICATION, WITHOUT LONG-TERM CURRENT USE OF INSULIN (H): Primary | ICD-10-CM

## 2022-03-31 NOTE — PROGRESS NOTES
SUBJECTIVE/OBJECTIVE:                Gaetano Sheriff is a 76 year old male seen for a follow-up visit for Medication Management Services.  He was referred to me from Dr Lm Garnica.     Chief Complaint: Follow up from [facility] visit on 02/02/22.     diabetes:  Current Medications:  Current Outpatient Medications   Medication     ACCU-CHEK MICHAEL PLUS test strip     apixaban ANTICOAGULANT (ELIQUIS) 5 MG tablet     Continuous Blood Gluc Sensor (FREESTYLE ABBI 14 DAY SENSOR) MISC     Continuous Blood Gluc Sensor (FREESTYLE ABBI 14 DAY SENSOR) MISC     empagliflozin (JARDIANCE) 25 MG TABS tablet     glimepiride (AMARYL) 4 MG tablet     levothyroxine (SYNTHROID/LEVOTHROID) 50 MCG tablet     losartan (COZAAR) 50 MG tablet     metFORMIN (GLUCOPHAGE) 500 MG tablet     metoprolol succinate ER (TOPROL-XL) 25 MG 24 hr tablet     nitroGLYcerin (NITROSTAT) 0.4 MG sublingual tablet     pioglitazone (ACTOS) 45 MG tablet     rosuvastatin (CRESTOR) 20 MG tablet     semaglutide (OZEMPIC, 0.25 OR 0.5 MG/DOSE,) 2 MG/1.5ML SOPN pen     No current facility-administered medications for this visit.     We discussed the benefits and risks of each medication.  Patient Questions/Concerns:  Patient has had a few episodes of hypoglycemia. Has dipped slightly below 70, but feels uneasy about this    ASSESSMENT/PLAN:                diabetes:  Assessment: Patient started Ozempic about 6 week ago. Has seen tremendous improvement, but recently has had a few episodes of hypoglycemia. These are typically happening while sleeping. Did recommend small snack before bedtime to help minimize.  Status: Diabetes under much better control  Drug Therapy Problems:  1) Hypoglycemia, likely due to glimepiride with addition of Ozempic.  Plan:  1) Discontinue glimepiride and continue to monitor BG levels.       I spent 45 minutes with this patient today.  All changes were made via collaborative practice agreement with Lm Garnica. A copy of the  visit note was provided to the patient's primary care pro    Sol Escobar, PharmD  Clinical Pharmacist  [facility] Phone: [phone]  Direct Office Phone: [phone]

## 2022-03-31 NOTE — Clinical Note
Follow up from appt 6 weeks ago. Remarkable improvement with addition of Ozempic. Discontinuing glimepiride today to try to minimize hypoglycemic events.

## 2022-04-17 ENCOUNTER — HEALTH MAINTENANCE LETTER (OUTPATIENT)
Age: 77
End: 2022-04-17

## 2022-04-19 DIAGNOSIS — E11.9 TYPE 2 DIABETES MELLITUS WITHOUT COMPLICATION, WITHOUT LONG-TERM CURRENT USE OF INSULIN (H): ICD-10-CM

## 2022-04-19 NOTE — TELEPHONE ENCOUNTER
Received incoming refill request for  Pending Prescriptions:                       Disp   Refills    OZEMPIC (0.25 OR 0.5 MG/DOSE) 2 MG/1.5ML *3 mL   1            Sig: INJECT 0.25 MG SUBCUTANEOUSLY EVERY 7 DAYS    Spoke to Sol Pharm D and she states that the patient has been stable on this and that a 6 month supply should be sent in. Routing to Dr. Garnica for approval.

## 2022-04-20 RX ORDER — SEMAGLUTIDE 1.34 MG/ML
INJECTION, SOLUTION SUBCUTANEOUS
Qty: 3 ML | Refills: 1 | Status: SHIPPED | OUTPATIENT
Start: 2022-04-20 | End: 2022-08-24

## 2022-06-09 ENCOUNTER — OFFICE VISIT (OUTPATIENT)
Dept: FAMILY MEDICINE | Facility: CLINIC | Age: 77
End: 2022-06-09

## 2022-06-09 VITALS — WEIGHT: 260.8 LBS | BODY MASS INDEX: 33.48 KG/M2

## 2022-06-09 DIAGNOSIS — E11.9 TYPE 2 DIABETES MELLITUS WITHOUT COMPLICATION, WITHOUT LONG-TERM CURRENT USE OF INSULIN (H): Primary | ICD-10-CM

## 2022-06-09 LAB
ALBUMIN SERPL-MCNC: 4.2 G/DL (ref 3.6–5.1)
ALBUMIN/GLOB SERPL: 1.6 {RATIO} (ref 1–2.5)
ALP SERPL-CCNC: 42 U/L (ref 33–130)
ALT 1742-6: 14 U/L (ref 0–32)
AST 1920-8: 37 U/L (ref 0–35)
BILIRUB SERPL-MCNC: 1 MG/DL (ref 0.2–1.2)
BUN SERPL-MCNC: 23 MG/DL (ref 7–25)
BUN/CREATININE RATIO: 19.3 (ref 6–22)
CALCIUM SERPL-MCNC: 9.7 MG/DL (ref 8.6–10.3)
CHLORIDE SERPLBLD-SCNC: 105.4 MMOL/L (ref 98–110)
CO2 SERPL-SCNC: 28.3 MMOL/L (ref 20–32)
CREAT SERPL-MCNC: 1.19 MG/DL (ref 0.6–1.3)
GLOBULIN, CALCULATED - QUEST: 2.7 (ref 1.9–3.7)
GLUCOSE SERPL-MCNC: 126 MG/DL (ref 60–99)
HBA1C MFR BLD: 7.9 % (ref 4–7)
POTASSIUM SERPL-SCNC: 4.49 MMOL/L (ref 3.5–5.3)
PROT SERPL-MCNC: 6.9 G/DL (ref 6.1–8.1)
SODIUM SERPL-SCNC: 141 MMOL/L (ref 135–146)

## 2022-06-09 PROCEDURE — 80053 COMPREHEN METABOLIC PANEL: CPT | Performed by: FAMILY MEDICINE

## 2022-06-09 PROCEDURE — 36415 COLL VENOUS BLD VENIPUNCTURE: CPT | Performed by: FAMILY MEDICINE

## 2022-06-09 PROCEDURE — 83036 HEMOGLOBIN GLYCOSYLATED A1C: CPT | Performed by: FAMILY MEDICINE

## 2022-06-09 NOTE — Clinical Note
Gaetano has had a remarkable improvement in BG readings, however his improvement does not seem to reflect his A1c. I would like to continue his current regimen and follow up again in 3 months.

## 2022-06-09 NOTE — PROGRESS NOTES
SUBJECTIVE/OBJECTIVE:                Gaetano Sheriff is a 76 year old male seen for a follow-up visit for Medication Management Services.  He was referred to me from Dr Lm Garnica.     Chief Complaint: Follow up from [facility] visit on 03.31.22.     Diabetes:  Current Medications:  Current Outpatient Medications   Medication     ACCU-CHEK MICHAEL PLUS test strip     apixaban ANTICOAGULANT (ELIQUIS) 5 MG tablet     Continuous Blood Gluc Sensor (FREESTYLE MENDEL 14 DAY SENSOR) MISC     Continuous Blood Gluc Sensor (FREESTYLE MENDEL 14 DAY SENSOR) MISC     empagliflozin (JARDIANCE) 25 MG TABS tablet     glimepiride (AMARYL) 4 MG tablet     levothyroxine (SYNTHROID/LEVOTHROID) 50 MCG tablet     losartan (COZAAR) 50 MG tablet     metFORMIN (GLUCOPHAGE) 500 MG tablet     metoprolol succinate ER (TOPROL-XL) 25 MG 24 hr tablet     nitroGLYcerin (NITROSTAT) 0.4 MG sublingual tablet     OZEMPIC, 0.25 OR 0.5 MG/DOSE, 2 MG/1.5ML SOPN pen     pioglitazone (ACTOS) 45 MG tablet     rosuvastatin (CRESTOR) 20 MG tablet     No current facility-administered medications for this visit.     We discussed the benefits and risks of each medication.  Patient Questions/Concerns:  Diabetes check-in.   Labs:  Hemoglobin A1C POCT   Date Value Ref Range Status   06/09/2022 7.9 (A) 4.0 - 7.0 % Final   01/25/2022 9.1 (A) 4.0 - 7.0 % Final   07/27/2021 8.8 (A) 4.0 - 7.0 % Final     Additional Information:  Patient has had about 7lb weight loss and averaging  over the last 3 months per Mendel sensors.    ASSESSMENT/PLAN:                Diabetes:  Assessment: In February, Ozempic was initiated. Patient has had great results and has seen about a 7lb weight loss. Appetite has decreased a bit, but manageable.   Status: BG readings indicate at goal, A1c not reflecting BG results  Drug Therapy Problems:  1) Medication regimen appropriate. Will watch hypoglycemic events and adjust accordingly.  2) Would like to try to get Mendel sensors covered  through insurance.   Plan:  1) Continue on current regimen. Patient will follow up with any hypoglycemic events. Continue to assess BG results weekly.  2) Will look into how to get Mendel sensors covered.      I spent 40 minutes with this patient today.  All changes were made via collaborative practice agreement with Lm Garnica. A copy of the visit note was provided to the patient's primary care pro    Tray ThorneD  Clinical Pharmacist  [facility]  General Clinic Phone: [phone]  Direct Office Phone: [phone]

## 2022-08-23 DIAGNOSIS — E11.9 TYPE 2 DIABETES MELLITUS WITHOUT COMPLICATION, WITHOUT LONG-TERM CURRENT USE OF INSULIN (H): ICD-10-CM

## 2022-08-23 RX ORDER — SEMAGLUTIDE 1.34 MG/ML
INJECTION, SOLUTION SUBCUTANEOUS
Qty: 2 ML | Refills: 0 | COMMUNITY
Start: 2022-08-23

## 2022-08-23 NOTE — TELEPHONE ENCOUNTER
Gaetano Sheriff is requesting a refill of:    Refused Prescriptions:                       Disp   Refills    OZEMPIC, 0.25 OR 0.5 MG/DOSE, 2 MG/1.5ML S*2 mL   0        Sig: INJECT 0.25 MG SUBCUTANEOUSLY EVERY 7 DAYS  Refused By: MIKE MAYS  Reason for Refusal: Patient needs appointment    Last seen by [facility] on 1/25/22 for DM, has seen Sol

## 2022-08-24 DIAGNOSIS — E11.9 TYPE 2 DIABETES MELLITUS WITHOUT COMPLICATION, WITHOUT LONG-TERM CURRENT USE OF INSULIN (H): ICD-10-CM

## 2022-08-24 RX ORDER — SEMAGLUTIDE 1.34 MG/ML
INJECTION, SOLUTION SUBCUTANEOUS
Qty: 3 ML | Refills: 1 | Status: SHIPPED | OUTPATIENT
Start: 2022-08-24 | End: 2023-03-07

## 2022-08-24 RX ORDER — SEMAGLUTIDE 1.34 MG/ML
INJECTION, SOLUTION SUBCUTANEOUS
Qty: 3 ML | Refills: 1 | Status: SHIPPED | OUTPATIENT
Start: 2022-08-24 | End: 2022-08-24

## 2022-08-24 NOTE — TELEPHONE ENCOUNTER
Patient called in and left a message stating that he did schedule an appt with Dr. Garnica on 9/12/22 but needs an extension of  Pending Prescriptions:                       Disp   Refills    semaglutide (OZEMPIC (0.25 OR 0.5 MG/DOSE*3 mL   1        Refused Prescriptions:                       Disp   Refills    OZEMPIC, 0.25 OR 0.5 MG/DOSE, 2 MG/1.5ML S*2 mL   0        Sig: INJECT 0.25 MG SUBCUTANEOUSLY EVERY 7 DAYS  Refused By: MIKE MAYS  Reason for Refusal: Patient needs appointment    To be sent in. Routing to Dr. Garnica to send in for patient so that he does not go without his medication, pt ran out yesterday.

## 2022-08-24 NOTE — TELEPHONE ENCOUNTER
Renewing Ozempic. Last visit 06.09.22 and follow up with Dr. Garnica scheduled for 09.12.22.     Patient doing well on 0.25mg dose. BG in range about 80% according to Mendel Sensor.

## 2022-09-12 ENCOUNTER — OFFICE VISIT (OUTPATIENT)
Dept: FAMILY MEDICINE | Facility: CLINIC | Age: 77
End: 2022-09-12

## 2022-09-12 VITALS
BODY MASS INDEX: 33.78 KG/M2 | SYSTOLIC BLOOD PRESSURE: 110 MMHG | DIASTOLIC BLOOD PRESSURE: 70 MMHG | WEIGHT: 263.2 LBS | HEIGHT: 74 IN | TEMPERATURE: 97.9 F | RESPIRATION RATE: 20 BRPM | HEART RATE: 64 BPM

## 2022-09-12 DIAGNOSIS — I10 ESSENTIAL HYPERTENSION: ICD-10-CM

## 2022-09-12 DIAGNOSIS — E11.9 TYPE 2 DIABETES MELLITUS WITHOUT COMPLICATION, WITHOUT LONG-TERM CURRENT USE OF INSULIN (H): Primary | ICD-10-CM

## 2022-09-12 DIAGNOSIS — E78.2 MIXED HYPERLIPIDEMIA: ICD-10-CM

## 2022-09-12 DIAGNOSIS — I25.10 ATHEROSCLEROSIS OF CORONARY ARTERY OF NATIVE HEART WITHOUT ANGINA PECTORIS, UNSPECIFIED VESSEL OR LESION TYPE: ICD-10-CM

## 2022-09-12 DIAGNOSIS — I48.0 PAROXYSMAL ATRIAL FIBRILLATION (H): ICD-10-CM

## 2022-09-12 DIAGNOSIS — E03.9 HYPOTHYROIDISM, UNSPECIFIED TYPE: ICD-10-CM

## 2022-09-12 LAB
% GRANULOCYTES: 61.4 %
ALBUMIN SERPL-MCNC: 4.4 G/DL (ref 3.6–5.1)
ALBUMIN/GLOB SERPL: 1.7 {RATIO} (ref 1–2.5)
ALP SERPL-CCNC: 44 U/L (ref 33–130)
ALT 1742-6: 13 U/L (ref 0–32)
AST 1920-8: 17 U/L (ref 0–35)
BILIRUB SERPL-MCNC: 0.9 MG/DL (ref 0.2–1.2)
BUN SERPL-MCNC: 27 MG/DL (ref 7–25)
BUN/CREATININE RATIO: 20.3 (ref 6–22)
CALCIUM SERPL-MCNC: 9.5 MG/DL (ref 8.6–10.3)
CHLORIDE SERPLBLD-SCNC: 107 MMOL/L (ref 98–110)
CHOLEST SERPL-MCNC: 129 MG/DL (ref 0–199)
CHOLEST/HDLC SERPL: 2 {RATIO} (ref 0–5)
CO2 SERPL-SCNC: 30.1 MMOL/L (ref 20–32)
CREAT SERPL-MCNC: 1.33 MG/DL (ref 0.6–1.3)
GFR SERPL CREATININE-BSD FRML MDRD: 55 ML/MIN/1.73M2
GLOBULIN, CALCULATED - QUEST: 2.6 (ref 1.9–3.7)
GLUCOSE SERPL-MCNC: 133 MG/DL (ref 60–99)
HBA1C MFR BLD: 7.9 % (ref 4–7)
HCT VFR BLD AUTO: 41.3 % (ref 40–53)
HDLC SERPL-MCNC: 63 MG/DL (ref 40–150)
HEMOGLOBIN: 13.7 G/DL (ref 13.3–17.7)
LDLC SERPL CALC-MCNC: 46 MG/DL (ref 0–130)
LYMPHOCYTES NFR BLD AUTO: 28.5 %
MCH RBC QN AUTO: 31.4 PG (ref 26–33)
MCHC RBC AUTO-ENTMCNC: 33.2 G/DL (ref 31–36)
MCV RBC AUTO: 94.6 FL (ref 78–100)
MONOCYTES NFR BLD AUTO: 10.1 %
PLATELET COUNT - QUEST: 176 10^9/L (ref 150–375)
POTASSIUM SERPL-SCNC: 4.93 MMOL/L (ref 3.5–5.3)
PROT SERPL-MCNC: 7 G/DL (ref 6.1–8.1)
RBC # BLD AUTO: 4.37 10*12/L (ref 4.4–5.9)
SODIUM SERPL-SCNC: 142.2 MMOL/L (ref 135–146)
TRIGL SERPL-MCNC: 102 MG/DL (ref 0–149)
WBC # BLD AUTO: 4.6 10*9/L (ref 4–11)

## 2022-09-12 PROCEDURE — 90662 IIV NO PRSV INCREASED AG IM: CPT | Performed by: FAMILY MEDICINE

## 2022-09-12 PROCEDURE — 36415 COLL VENOUS BLD VENIPUNCTURE: CPT | Performed by: FAMILY MEDICINE

## 2022-09-12 PROCEDURE — 83036 HEMOGLOBIN GLYCOSYLATED A1C: CPT | Performed by: FAMILY MEDICINE

## 2022-09-12 PROCEDURE — 80061 LIPID PANEL: CPT | Performed by: FAMILY MEDICINE

## 2022-09-12 PROCEDURE — 80053 COMPREHEN METABOLIC PANEL: CPT | Performed by: FAMILY MEDICINE

## 2022-09-12 PROCEDURE — 99214 OFFICE O/P EST MOD 30 MIN: CPT | Mod: 25 | Performed by: FAMILY MEDICINE

## 2022-09-12 PROCEDURE — 85025 COMPLETE CBC W/AUTO DIFF WBC: CPT | Performed by: FAMILY MEDICINE

## 2022-09-12 PROCEDURE — G0008 ADMIN INFLUENZA VIRUS VAC: HCPCS | Performed by: FAMILY MEDICINE

## 2022-09-12 NOTE — PROGRESS NOTES
"  Assessment & Plan     Type 2 diabetes mellitus without complication, without long-term current use of insulin (H)  A1C stable, under 8 so reasonable range, will have Sol reach out (MTM) to see if any tweaks she would recommend after reviewing CGM data  - HEMOGLOBIN A1C (BFP)  - Comprehensive Metobolic Panel (BFP)  - VENOUS COLLECTION    Mixed hyperlipidemia  Control uncertain, continue current medications at current doses pending labs  - Lipid Panel (BFP)  - Comprehensive Metobolic Panel (BFP)  - VENOUS COLLECTION    Essential hypertension  Well controlled, continue current medications at current doses   - Comprehensive Metobolic Panel (BFP)  - VENOUS COLLECTION    Atherosclerosis of coronary artery of native heart without angina pectoris, unspecified vessel or lesion type  stable symptomatically , reviewed cardiology notes    Hypothyroidism, unspecified type  stable symptomatically continue current medications at current doses     Paroxysmal atrial fibrillation (H)  On Eliquis, continue current medications at current doses       Review of external notes as documented elsewhere in note  Review of the result(s) of each unique test - labs  Ordering of each unique test  Prescription drug management         BMI:   Estimated body mass index is 33.79 kg/m  as calculated from the following:    Height as of this encounter: 1.88 m (6' 2\").    Weight as of this encounter: 119.4 kg (263 lb 3.2 oz).   Weight management plan: Discussed healthy diet and exercise guidelines    FUTURE APPOINTMENTS:       - Follow-up visit in 6 mo  Work on weight loss  Regular exercise    No follow-ups on file.    Lm Garnica MD  [facility]    Subjective   Gaetano is a 76 year old, presenting for the following health issues:  Recheck Medication      HPI     Diabetes Follow-up-in 5 agents    How often are you checking your blood sugar? Continuous glucose monitor  74% in range  What time of day are you checking your " blood sugars (select all that apply)?  Not applicable  Have you had any blood sugars above 200?  No  Have you had any blood sugars below 70?  No    What symptoms do you notice when your blood sugar is low?  Shaky and Weak    What concerns do you have today about your diabetes? None     Do you have any of these symptoms? (Select all that apply)  No numbness or tingling in feet.  No redness, sores or blisters on feet.  No complaints of excessive thirst.  No reports of blurry vision.  No significant changes to weight.    Have you had a diabetic eye exam in the last 12 months? Yes, DR Lyons 2/22          Hyperlipidemia Follow-Up      Are you regularly taking any medication or supplement to lower your cholesterol?   Yes- crestor    Are you having muscle aches or other side effects that you think could be caused by your cholesterol lowering medication?      Hypertension Follow-up      Do you check your blood pressure regularly outside of the clinic? Yes     Are you following a low salt diet? No    Are your blood pressures ever more than 140 on the top number (systolic) OR more   than 90 on the bottom number (diastolic), for example 140/90? No    BP Readings from Last 2 Encounters:   09/12/22 110/70   03/08/22 102/68     Hemoglobin A1C (%)   Date Value   06/09/2022 7.9 (A)   01/25/2022 9.1 (A)     LDL Cholesterol Direct (mg/dL)   Date Value   01/25/2022 33   07/27/2021 67       Vascular Disease Follow-up      How often do you take nitroglycerin? Never    Do you take an aspirin every day? No on eliquis for A Fib    Hypothyroidism Follow-up      Since last visit, patient describes the following symptoms: Weight stable, no hair loss, no skin changes, no constipation, no loose stools      How many servings of fruits and vegetables do you eat daily?  2-3    On average, how many sweetened beverages do you drink each day (Examples: soda, juice, sweet tea, etc.  Do NOT count diet or artificially sweetened beverages)?    "1    How many days per week do you exercise enough to make your heart beat faster? 5    How many minutes a day do you exercise enough to make your heart beat faster? 20 - 29    How many days per week do you miss taking your medication? 0        Review of Systems   Constitutional, HEENT, cardiovascular, pulmonary, gi and gu systems are negative, except as otherwise noted.      Objective    /70 (BP Location: Left arm, Patient Position: Chair, Cuff Size: Adult Large)   Pulse 64   Temp 97.9  F (36.6  C) (Temporal)   Resp 20   Ht 1.88 m (6' 2\")   Wt 119.4 kg (263 lb 3.2 oz)   BMI 33.79 kg/m    Body mass index is 33.79 kg/m .  Physical Exam   GENERAL: healthy, alert and no distress  EYES: Eyes grossly normal to inspection, PERRL and conjunctivae and sclerae normal  HENT: ear canals and TM's normal, nose and mouth without ulcers or lesions  NECK: no adenopathy, no asymmetry, masses, or scars and thyroid normal to palpation  RESP: lungs clear to auscultation - no rales, rhonchi or wheezes  CV: regular rate and rhythm, normal S1 S2, no S3 or S4, no murmur, click or rub, no peripheral edema and peripheral pulses strong  ABDOMEN: soft, nontender, no hepatosplenomegaly, no masses and bowel sounds normal  MS: no gross musculoskeletal defects noted, no edema  PSYCH: mentation appears normal, affect normal/bright  Diabetic foot exam: normal DP and PT pulses, no trophic changes or ulcerative lesions and normal sensory exam    Results for orders placed or performed in visit on 09/12/22 (from the past 24 hour(s))   HEMOGLOBIN A1C (BFP)   Result Value Ref Range    Hemoglobin A1C 7.9 (A) 4.0 - 7.0 %                     "

## 2022-09-12 NOTE — NURSING NOTE
Gaetano CAN Sheriff is here for a diabetic check and medication refill.    Questioned patient about current smoking habits.  Pt. has never smoked.  Body mass index is 33.79 kg/m .  PULSE regular  My Chart: active  CLASSIFICATION OF OVERWEIGHT AND OBESITY BY BMI                        Obesity Class           BMI(kg/m2)  Underweight                                    < 18.5  Normal                                         18.5-24.9  Overweight                                     25.0-29.9  OBESITY                     I                  30.0-34.9                             II                 35.0-39.9  EXTREME OBESITY             III                >40                            Patient's  BMI Body mass index is 33.79 kg/m .  http://hin.nhlbi.nih.gov/menuplanner/menu.cgi    Pre-visit planning  Immunizations - up to date  Colonoscopy -   Mammogram -   Asthma -   PHQ9 -    SHERWIN-7 -       She should be taken of the list as she has severe developmental problems and is not sexually active.

## 2022-09-13 LAB
% SATURATION - QUEST: 25 % (CALC) (ref 20–48)
FERRITIN SERPL-MCNC: 132 NG/ML (ref 24–380)
IRON: 86 MCG/DL (ref 50–180)
TIBC - QUEST: 340 MCG/DL (CALC) (ref 250–425)

## 2022-11-01 NOTE — PROVIDER NOTIFICATION
Assessment:  1  Lumbar radiculopathy    2  DDD (degenerative disc disease), lumbar    3  Sacroiliitis ([address] )    4  Spinal stenosis of lumbar region with neurogenic claudication        Plan:  1  I will trial the patient on gabapentin 100 mg q h s  and titrate up to 300 mg q h s  for neuropathic pain complaints  Patient was educated on medications most common side effects which include dizziness, drowsiness, and swelling of the hands and feet  Patient was instructed to call the office with any adverse medication side effects  The patient is also aware that if they would like to come off of the medication, they need to let us know as suddenly stopping the medication puts them at risk to have a seizure  The patient was agreeable and verbalized an understanding  2  Patient not interested in a 2nd surgical opinion at this time   3  Will avoid NSAIDs secondary to kidney disease and history of GI bleed  4  May continue Tylenol p r n  and should not exceed more than 3000 mg in 24 hours   5  Patient prefers to avoid opioid medications secondary to side effects  6  Follow-up in 4 weeks or sooner if needed    History of Present Illness: The patient is a 79 y o  male with a history of heart and kidney transplant, CAD, CKD last seen on 9/20/22 who presents for a follow up office visit in regards to chronic left-sided low back pain that will occasionally radiate into the buttock  Patient denies right-sided symptoms, bowel or bladder incontinence or saddle anesthesia  Did have LESI in July 2022 with mild improvement for about a month and L3-5 RFA in August 2021 without relief  He recently underwent an SI joint injection on September 27, 2022 with minimal improvement of his pain  He was evaluated by Orthopedic surgery and deemed a poor candidate for surgical intervention secondary to numerous comorbidities  He has taken Tramadol in the past but did not like how it made him feel    He is unable to take NSAIDs secondary to Hemoglobin A1C 8.9 at pre-op visit on 04/02/2020. Results called to [place] at Dr. Apple's office.   history of GI bleed and CKD and finds minimal relief with Tylenol  Patient rates his pain a 10/10 on the numeric pain rating scale  He constantly has pain throughout the day which is described as sharp      I have personally reviewed and/or updated the patient's past medical history, past surgical history, family history, social history, current medications, allergies, and vital signs today  Review of Systems:    Review of Systems   Respiratory: Negative for shortness of breath  Cardiovascular: Negative for chest pain  Gastrointestinal: Negative for constipation, diarrhea, nausea and vomiting  Musculoskeletal: Positive for gait problem  Negative for arthralgias, joint swelling and myalgias  Skin: Negative for rash  Neurological: Negative for dizziness, seizures and weakness  All other systems reviewed and are negative          Past Medical History:   Diagnosis Date   • Achilles tendinitis, unspecified leg     Last assessed - 4/29/14   • Actinic keratosis     Scalp and face   • Acute MI, inferolateral wall ([facility] [address] ) 01/02/2018   • Anxiety    • Arthritis    • Arthritis of shoulder region, degenerative     Last assessed - 7/23/15   • Bleeding from anus    • Bone spur     Last assessed - 4/29/14   • CHF (congestive heart failure) (HCC)    • Chronic pain disorder     lumbar   • Closed displaced fracture of fifth metatarsal bone of left foot with routine healing     Last assessed - 4/20/16   • Coronary artery disease    • COVID-19 08/17/2022   • Degenerative joint disease (DJD) of hip     Last assessed - 4/1/15   • Displaced fracture of fifth metatarsal bone, left foot, initial encounter for closed fracture     Last assessed - 5/13/16   • Displaced fracture of fourth metatarsal bone, left foot, initial encounter for closed fracture     Last assessed - 5/13/16   • Dyspnea on exertion     current 4/2021   • GERD (gastroesophageal reflux disease)    • Gout     Last assessed - 4/29/14   • H/O angioplasty heart attack   • H/O kidney transplant 2007   • Herpes zoster    • History of heart transplant ([facility] [address] ) 12/04/1997    [facility] [place]; acute rejection in 2006   • History of transfusion 1997    during heart transplant, no rx   • Hyperlipidemia    • Hypertension    • Mass of face     Last assessed - 12/29/16   • Myocardial infarction [facility])    • Past heart attack     [id number]  [id number]   • Recurrent UTI     Last assessed - 1/28/16   • Renal disorder     currently only one functional kidney   • S/P CABG x 3     03/22/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   • Skin lesion of right lower extremity     Resolved - 8/4/16   • Sleep apnea    • Small bowel obstruction ([facility] [address] )     Last assessed - 11/4/16   • Solitary kidney, acquired    • Umbilical hernia    • Ventral hernia     Last assessed - 1/28/16   • Vesico-ureteral reflux     Last assessed - 12/21/15       Past Surgical History:   Procedure Laterality Date   • CATARACT EXTRACTION Bilateral    • CATARACT EXTRACTION, BILATERAL     • CHOLECYSTECTOMY     • COLONOSCOPY     • CORONARY ANGIOPLASTY WITH STENT PLACEMENT  02/2019   • CORONARY ARTERY BYPASS GRAFT  03/1982    x3   • EGD AND COLONOSCOPY N/A 7/17/2018    Procedure: EGD AND COLONOSCOPY;  Surgeon: Chung Rockwell DO;  Location: [facility];   Service: Gastroenterology   • ESOPHAGOGASTRODUODENOSCOPY     • FLAP LOCAL HEAD / NECK N/A 4/29/2021    Procedure: FLAP X2 SCALP;  Surgeon: Dahlia Moran MD;  [facility];  Service: Plastics   • FULL THICKNESS SKIN GRAFT Left 1/27/2017    Procedure: NASAL RADIX DEFECT RECONSTRUCTION; FULL THICKNESS SKIN GRAFT ;  Surgeon: Dahlia Moran MD;  [facility];  Service:    • FULL THICKNESS SKIN GRAFT Right 9/11/2017    Procedure: FULL THICKNESS SKIN GRAFT VERSUS FLAP RECONSTRUCTION;  Surgeon: Dahlia Moran MD;  [facility];  Service: Plastics   • HEART TRANSPLANT  12/04/1997   • HERNIA REPAIR      chest hernia in 1999   • LAPAROTOMY N/A 10/24/2016    Procedure: Exploratory laparotomy, lysis of adhesions  ;  Surgeon: Dayanara Calzada MD;  [facility];  Service:    • MOHS RECONSTRUCTION N/A 6/28/2016    Procedure: RECONSTRUCTION MOHS DEFECT; NASAL ROOT; NASAL ALA with flap and skin graft;  Surgeon: Lucrecia Ramos MD;  Location: [facility];  Service:    • MOHS RECONSTRUCTION N/A 4/29/2021    Procedure: RECONSTRUCTION MOHS DEFECT X3 SCALP;  Surgeon: Lucrecia Ramos MD;  [facility]: [facility];  Service: Plastics   • WA DELAY/SECTN FLAP LID,NOS,EAR,LIP N/A 2/16/2017    Procedure: DIVISION/INSET FOREHEAD FLAP TO NOSE;  Surgeon: Lucrecia Ramos MD;  [facility];  Service: Plastics   • WA [address] [place] [address] Dr <0 5 CM FACE,FACIAL Left 1/27/2017    Procedure: NASAL SIDE WALL SQUAMOUS CELL CANCER WIDE EXCISION ;  Surgeon: Cassandra Wang MD;  [facility];  Service: Surgical Oncology   • WA EXC SKIN MALIG <0 5 CM REMAINDER BODY N/A 6/29/2017    Procedure: SCALP EXCISION SQUAMOUS CELL CANCER;  Surgeon: Cassandra Wang MD;  [facility];  Service: Surgical Oncology   • WA EXC SKIN MALIG >4 CM FACE,FACIAL Right 9/11/2017    Procedure: EAR SCC IN SITU EXCISION; FROZEN SECTION;  Surgeon: Lucrecia Ramos MD;  [facility];  Service: Plastics   • WA SPLIT GRFT,HEAD,FAC,HAND,FEET <100 SQCM N/A 6/29/2017    Procedure: SCALP DEFECT RECONSTRUCTION; SPLIT THICKNESS SKIN GRAFT;  Surgeon: Lucrecia Ramos MD;  [facility];  Service: Plastics   • SKIN BIOPSY  05/12/2016    Nasal root and Lt ala    • SKIN CANCER EXCISION Bilateral 01/06/2021    cancer remover from lip   • SKIN LESION EXCISION      Nose   • TONSILLECTOMY     • TRANSPLANTATION RENAL  12/29/2006   • TRANSPLANTATION RENAL  09/14/2007       Family History   Problem Relation Age of Onset   • Hypertension Mother    • Heart disease Mother    • Coronary artery disease Mother    • Pancreatic cancer Mother    • Diabetes Father    • Coronary artery disease Father    • Heart disease Sister    • Lung cancer Sister    • Heart disease Brother    • Hypertension Brother    • Colon cancer Brother    • Thyroid cancer Daughter    • Stroke Paternal Grandmother    • Heart disease Sister    • Hypertension Sister    • Heart disease Sister    • Hypertension Sister    • Heart disease Brother    • Hypertension Brother        Social History     Occupational History   • Not on file   Tobacco Use   • Smoking status: Former Smoker     Years: 16 00     Types: [de-identified], Pipe     Quit date:      Years since quittin 8   • Smokeless tobacco: Never Used   • Tobacco comment: Smoked only cigars ;NO cigarettes  ; Quit at age 42 per [employer]    Vaping Use   • Vaping Use: Never used   Substance and Sexual Activity   • Alcohol use:  Yes     Alcohol/week: 1 0 standard drink     Types: 1 Glasses of wine per week     Comment: occasional   x4 monthly   • Drug use: No   • Sexual activity: Not Currently         Current Outpatient Medications:   •  acetaminophen (TYLENOL) 325 mg tablet, Take 3 tablets (975 mg total) by mouth every 8 (eight) hours, Disp: 90 tablet, Rfl: 0  •  allopurinol (ZYLOPRIM) 100 mg tablet, Take 200 mg by mouth 2 (two) times a day per patient taking 100mg in AM and 200mg PM , Disp: , Rfl:   •  Aspirin 81 MG CAPS, Take 81 mg by mouth in the morning, Disp: , Rfl:   •  atorvastatin (LIPITOR) 40 mg tablet, Take 40 mg by mouth daily, Disp: , Rfl:   •  benzonatate (TESSALON PERLES) 100 mg capsule, Take 1 capsule (100 mg total) by mouth 3 (three) times a day as needed for cough, Disp: 30 capsule, Rfl: 0  •  Calcium Carbonate 1500 (600 Ca) MG TABS, Take 600 mg by mouth daily , Disp: , Rfl:   •  carvedilol (COREG) 25 mg tablet, Take 1 tablet by mouth 2 (two) times a day Hold  and 22 VO via [facility] , Disp: , Rfl:   •  furosemide (LASIX) 20 mg tablet, TAKE 2 TABLETS (40 MG TOTAL) BY MOUTH DAILY, Disp: 180 tablet, Rfl: 3  •  isosorbide mononitrate (IMDUR) 120 mg 24 hr tablet, Take 60 mg by mouth daily, Disp: , Rfl:   •  mirtazapine (REMERON) 7 5 MG tablet, Take 1 tablet (7 5 mg total) by mouth daily at bedtime, Disp: 90 tablet, Rfl: 0  •  multivitamin (THERAGRAN) TABS, Take 1 tablet by mouth daily  , Disp: , Rfl:   •  multivitamin (THERAGRAN) TABS, Take 1 tablet by mouth, Disp: , Rfl:   •  mycophenolic acid (MYFORTIC) 613 mg EC tablet, Take 180 mg by mouth 2 (two) times a day  , Disp: , Rfl:   •  nitroglycerin (NITROSTAT) 0 4 mg SL tablet, DISSOLVE 1 TABLET (0 4 MG TOTAL) UNDER THE TONGUE EVERY 5 (FIVE) MINUTES AS NEEDED FOR CHEST PAIN, Disp: 25 tablet, Rfl: 4  •  OMEGA-3-ACID ETHYL ESTERS PO, Take 1 g by mouth daily  , Disp: , Rfl:   •  omeprazole (PriLOSEC) 20 mg delayed release capsule, Take 2 capsules (40 mg total) by mouth every evening (Patient taking differently: Take 20 mg by mouth as needed (patient doesnt always feel need for it )), Disp: 30 capsule, Rfl: 0  •  Polyvinyl Alcohol-Povidone (REFRESH OP), Apply to eye as needed, Disp: , Rfl:   •  prednisoLONE acetate (PRED FORTE) 1 % ophthalmic suspension, , Disp: , Rfl:   •  predniSONE 2 5 mg tablet, Take 2 5 mg by mouth daily, Disp: , Rfl:   •  tacrolimus (PROGRAF) 1 mg capsule, Take 1 mg by mouth every 12 (twelve) hours, Disp: , Rfl:   •  tamsulosin (FLOMAX) 0 4 mg, Take 1 capsule (0 4 mg total) by mouth in the morning to take in evening (Patient taking differently: Take 0 4 mg by mouth daily with dinner), Disp: 90 capsule, Rfl: 1  •  zolpidem (AMBIEN) 10 mg tablet, Take 10 mg by mouth daily at bedtime  , Disp: , Rfl:     Allergies   Allergen Reactions   • Aspartame - Food Allergy Rash   • Atenolol Other (See Comments)     Category: Allergy; Annotation - [id number]: all forms  Edema of skin    Category: Allergy;  Annotation - [id number]: all forms  Edema of skin   • Cyclosporine Diarrhea   • Monosodium Glutamate - Food Allergy Rash   • Morphine Other (See Comments) and Hallucinations     Hallucinations  Hallucinations   • Penicillins Rash and Other (See Comments)     Category: Allergy; Annotation - [id number]: all forms  md cerda meropenem  Category: Allergy; Annotation - [id number]: all forms   • Sucralose - Food Allergy Rash   • Sulfa Antibiotics Rash       Physical Exam:    Ht 5' 5" (1 651 m)   BMI 34 10 kg/m²     Constitutional:normal, well developed, well nourished, alert, in no distress and non-toxic and no overt pain behavior  Eyes:anicteric  HEENT:grossly intact  Neck:supple, symmetric, trachea midline and no masses   Pulmonary:even and unlabored  Cardiovascular:No edema or pitting edema present  Skin:Normal without rashes or lesions and well hydrated  Psychiatric:Mood and affect appropriate  Neurologic:Cranial Nerves II-XII grossly intact  Musculoskeletal:antalgic gait, ambulates with a walker      Imaging  No orders to display         No orders of the defined types were placed in this encounter

## 2022-12-23 ENCOUNTER — TELEPHONE (OUTPATIENT)
Dept: FAMILY MEDICINE | Facility: CLINIC | Age: 77
End: 2022-12-23

## 2022-12-23 NOTE — TELEPHONE ENCOUNTER
Pt called stating he tested pos for covid a few days ago and again today. He has a sore throat, fatigue, and runny nose. Overall he said he is feeling better everyday. We discussed Paxlovid versus using OTC medications. He will continue on with OTC medications. Encouraged rest, fluids, Mucinex, salt water gargle, chloraseptic spray, throat lozenges, adding zinc and vitamin d. All his questions were answered.

## 2023-02-01 DIAGNOSIS — E11.9 TYPE 2 DIABETES MELLITUS WITHOUT COMPLICATION, WITHOUT LONG-TERM CURRENT USE OF INSULIN (H): Primary | ICD-10-CM

## 2023-02-01 RX ORDER — FLASH GLUCOSE SENSOR
KIT MISCELLANEOUS
Qty: 2 EACH | Refills: 5 | Status: SHIPPED | OUTPATIENT
Start: 2023-02-01 | End: 2023-08-04

## 2023-02-01 NOTE — TELEPHONE ENCOUNTER
Gaetano Sheriff is requesting a refill of:    Pending Prescriptions:                       Disp   Refills    Continuous Blood Gluc Sensor (FREESTYLE L*2 each 5            Sig: APPLY 1 SENSOR AND CHANGE EVERY 14 DAYS AS           DIRECTED    Please close encounter if RX was sent. Thanks, Jaz

## 2023-03-06 DIAGNOSIS — E11.9 TYPE 2 DIABETES MELLITUS WITHOUT COMPLICATION, WITHOUT LONG-TERM CURRENT USE OF INSULIN (H): ICD-10-CM

## 2023-03-07 ENCOUNTER — OFFICE VISIT (OUTPATIENT)
Dept: FAMILY MEDICINE | Facility: CLINIC | Age: 78
End: 2023-03-07

## 2023-03-07 VITALS
BODY MASS INDEX: 34.27 KG/M2 | DIASTOLIC BLOOD PRESSURE: 62 MMHG | HEART RATE: 68 BPM | WEIGHT: 267 LBS | SYSTOLIC BLOOD PRESSURE: 116 MMHG | RESPIRATION RATE: 20 BRPM | HEIGHT: 74 IN | TEMPERATURE: 97.6 F

## 2023-03-07 DIAGNOSIS — E78.2 MIXED HYPERLIPIDEMIA: ICD-10-CM

## 2023-03-07 DIAGNOSIS — E11.9 TYPE 2 DIABETES MELLITUS WITHOUT COMPLICATION, WITHOUT LONG-TERM CURRENT USE OF INSULIN (H): Primary | ICD-10-CM

## 2023-03-07 DIAGNOSIS — I25.10 ATHEROSCLEROSIS OF CORONARY ARTERY OF NATIVE HEART WITHOUT ANGINA PECTORIS, UNSPECIFIED VESSEL OR LESION TYPE: ICD-10-CM

## 2023-03-07 DIAGNOSIS — I48.0 PAROXYSMAL ATRIAL FIBRILLATION (H): ICD-10-CM

## 2023-03-07 DIAGNOSIS — R25.1 TREMOR OF LEFT HAND: ICD-10-CM

## 2023-03-07 DIAGNOSIS — I10 ESSENTIAL HYPERTENSION: ICD-10-CM

## 2023-03-07 DIAGNOSIS — E03.9 HYPOTHYROIDISM, UNSPECIFIED TYPE: ICD-10-CM

## 2023-03-07 DIAGNOSIS — Z00.00 ENCOUNTER FOR ANNUAL WELLNESS EXAM IN MEDICARE PATIENT: ICD-10-CM

## 2023-03-07 LAB
ALBUMIN (URINE) MG/L: 30
ALBUMIN SERPL-MCNC: 4.2 G/DL (ref 3.6–5.1)
ALBUMIN URINE MG/G CR: <30 MG/G CREATININE
ALBUMIN/GLOB SERPL: 1.7 {RATIO} (ref 1–2.5)
ALP SERPL-CCNC: 50 U/L (ref 33–130)
ALT 1742-6: 13 U/L (ref 0–32)
AST 1920-8: 16 U/L (ref 0–35)
BILIRUB SERPL-MCNC: 0.8 MG/DL (ref 0.2–1.2)
BUN SERPL-MCNC: 24 MG/DL (ref 7–25)
BUN/CREATININE RATIO: 17.8 (ref 6–22)
CALCIUM SERPL-MCNC: 9.2 MG/DL (ref 8.6–10.3)
CHLORIDE SERPLBLD-SCNC: 107 MMOL/L (ref 98–110)
CHOLEST SERPL-MCNC: 121 MG/DL (ref 0–199)
CHOLEST/HDLC SERPL: 2 {RATIO} (ref 0–5)
CO2 SERPL-SCNC: 26.2 MMOL/L (ref 20–32)
CREAT SERPL-MCNC: 1.32 MG/DL (ref 0.6–1.3)
CREATININE URINE MG/DL: 100 MG/DL
GFR SERPL CREATININE-BSD FRML MDRD: 55 ML/MIN/1.73M2
GLOBULIN, CALCULATED - QUEST: 2.4 (ref 1.9–3.7)
GLUCOSE SERPL-MCNC: 149 MG/DL (ref 60–99)
HBA1C MFR BLD: 7.7 % (ref 4–7)
HDLC SERPL-MCNC: 60 MG/DL (ref 40–150)
LDLC SERPL CALC-MCNC: 39 MG/DL (ref 0–130)
POTASSIUM SERPL-SCNC: 4.45 MMOL/L (ref 3.5–5.3)
PROT SERPL-MCNC: 6.6 G/DL (ref 6.1–8.1)
SODIUM SERPL-SCNC: 142.6 MMOL/L (ref 135–146)
TRIGL SERPL-MCNC: 112 MG/DL (ref 0–149)

## 2023-03-07 PROCEDURE — 36415 COLL VENOUS BLD VENIPUNCTURE: CPT | Performed by: FAMILY MEDICINE

## 2023-03-07 PROCEDURE — 83036 HEMOGLOBIN GLYCOSYLATED A1C: CPT | Performed by: FAMILY MEDICINE

## 2023-03-07 PROCEDURE — 82570 ASSAY OF URINE CREATININE: CPT | Performed by: FAMILY MEDICINE

## 2023-03-07 PROCEDURE — 80061 LIPID PANEL: CPT | Performed by: FAMILY MEDICINE

## 2023-03-07 PROCEDURE — 82043 UR ALBUMIN QUANTITATIVE: CPT | Performed by: FAMILY MEDICINE

## 2023-03-07 PROCEDURE — 99214 OFFICE O/P EST MOD 30 MIN: CPT | Mod: 25 | Performed by: FAMILY MEDICINE

## 2023-03-07 PROCEDURE — 80053 COMPREHEN METABOLIC PANEL: CPT | Performed by: FAMILY MEDICINE

## 2023-03-07 PROCEDURE — G0439 PPPS, SUBSEQ VISIT: HCPCS | Performed by: FAMILY MEDICINE

## 2023-03-07 RX ORDER — LEVOTHYROXINE SODIUM 50 UG/1
TABLET ORAL
Qty: 90 TABLET | Refills: 0 | COMMUNITY
Start: 2023-03-07

## 2023-03-07 RX ORDER — PIOGLITAZONEHYDROCHLORIDE 45 MG/1
TABLET ORAL
Qty: 90 TABLET | Refills: 0 | COMMUNITY
Start: 2023-03-07

## 2023-03-07 RX ORDER — SEMAGLUTIDE 1.34 MG/ML
INJECTION, SOLUTION SUBCUTANEOUS
Qty: 3 ML | Refills: 1 | Status: SHIPPED | OUTPATIENT
Start: 2023-03-07 | End: 2023-04-26

## 2023-03-07 RX ORDER — GLIMEPIRIDE 4 MG/1
4 TABLET ORAL
Qty: 90 TABLET | Refills: 3 | Status: SHIPPED | OUTPATIENT
Start: 2023-03-07 | End: 2024-02-13

## 2023-03-07 RX ORDER — GLIMEPIRIDE 4 MG/1
TABLET ORAL
Qty: 90 TABLET | Refills: 0 | COMMUNITY
Start: 2023-03-07

## 2023-03-07 RX ORDER — SEMAGLUTIDE 1.34 MG/ML
INJECTION, SOLUTION SUBCUTANEOUS
Qty: 4 ML | Refills: 0 | COMMUNITY
Start: 2023-03-07

## 2023-03-07 RX ORDER — LEVOTHYROXINE SODIUM 50 UG/1
50 TABLET ORAL DAILY
Qty: 90 TABLET | Refills: 3 | Status: SHIPPED | OUTPATIENT
Start: 2023-03-07 | End: 2024-02-13

## 2023-03-07 RX ORDER — PIOGLITAZONEHYDROCHLORIDE 45 MG/1
45 TABLET ORAL DAILY
Qty: 90 TABLET | Refills: 3 | Status: SHIPPED | OUTPATIENT
Start: 2023-03-07 | End: 2024-02-13

## 2023-03-07 RX ORDER — EMPAGLIFLOZIN 25 MG/1
TABLET, FILM COATED ORAL
Qty: 90 TABLET | Refills: 0 | COMMUNITY
Start: 2023-03-07

## 2023-03-07 NOTE — NURSING NOTE
Gaetano CAN Sheriff is here for a diabetic check and medication refill.    Questioned patient about current smoking habits.  Pt. has never smoked.  Body mass index is 34.28 kg/m .  PULSE regular  My Chart: active  CLASSIFICATION OF OVERWEIGHT AND OBESITY BY BMI                        Obesity Class           BMI(kg/m2)  Underweight                                    < 18.5  Normal                                         18.5-24.9  Overweight                                     25.0-29.9  OBESITY                     I                  30.0-34.9                             II                 35.0-39.9  EXTREME OBESITY             III                >40                            Patient's  BMI Body mass index is 34.28 kg/m .  http://hin.nhlbi.nih.gov/menuplanner/menu.cgi    Pre-visit planning  Immunizations - up to date  Colonoscopy -   Mammogram -   Asthma -   PHQ9 -    SHERWIN-7 -      The patient has verbalized that it is ok to leave a detailed voice message on the patient's cell phone with results/recommendations from this visit.

## 2023-03-07 NOTE — TELEPHONE ENCOUNTER
Gaetano Sheriff is requesting a refill of:    Refused Prescriptions:                       Disp   Refills    JARDIANCE 25 MG TABS tablet [Pharmacy Med *90 tab*0        Sig: Take 1 tablet by mouth once daily  Refused By: MIKE MAYS  Reason for Refusal: OTHER    semaglutide (OZEMPIC, 0.25 OR 0.5 MG/DOSE,*4 mL   0        Sig: INJECT 0.25MG SUBCUTANEOUSLY EVERY 7 DAYS  Refused By: MIKE MAYS  Reason for Refusal: OTHER    glimepiride (AMARYL) 4 MG tablet [Pharmacy*90 tab*0        Sig: Take 1 tablet by mouth once daily with breakfast  Refused By: MIKE MAYS  Reason for Refusal: OTHER    metFORMIN (GLUCOPHAGE) 500 MG tablet [Phar*360 ta*0        Sig: TAKE 2 TABLETS BY MOUTH TWICE DAILY WITH MEALS  Refused By: MIKE MAYS  Reason for Refusal: OTHER    levothyroxine (SYNTHROID/LEVOTHROID) 50 MC*90 tab*0        Sig: Take 1 tablet by mouth once daily  Refused By: MIKE MAYS  Reason for Refusal: OTHER    pioglitazone (ACTOS) 45 MG tablet [Pharmac*90 tab*0        Sig: Take 1 tablet by mouth once daily  Refused By: MIKE MAYS  Reason for Refusal: OTHER    Sent new Rx today

## 2023-03-07 NOTE — PROGRESS NOTES
Gaetano Sheriff is a 77 year old male who presents for Medicare Annual Wellness Visit.    Current providers caring for this patient include:  Patient Care Team:  Lm Garnica MD as PCP - General (Family Practice)  Lm Garnica MD as Assigned PCP  Zenaida Garg RD as Diabetes Educator (Dietitian, Registered)  Marcia Escobar RPH as Assigned MTM Pharmacist    Complete Medical and Social history reviewed with patient, outlined below.    Patient Active Problem List   Diagnosis     Acquired hypothyroidism     Acute diverticulitis     Atherosclerotic heart disease     Essential hypertension     Mixed hyperlipidemia     BMI 30.0-30.9,adult     Presence of cardiac pacemaker     Uncontrolled type 2 diabetes mellitus with hyperglycemia (H)     Type 2 diabetes mellitus without complication, without long-term current use of insulin (H)     Health Care Home     ACP (advance care planning)     Syncope     Shoulder joint pain       Past Medical History:   Diagnosis Date     Acquired hypothyroidism      Arrhythmia      Atherosclerotic heart disease      Chronic infection      Diabetes mellitus (H)     type 2     Essential hypertension      Hyperlipidemia      Hypertension      Mixed hyperlipidemia      SSS (sick sinus syndrome) (H)      Syncope 2001    PPM placed     Type 2 diabetes mellitus without complication, without long-term current use of insulin (H) 04/02/2020     Uncontrolled type 2 diabetes mellitus with hyperglycemia (H) 01/10/2019    A1c on 8/31/20 was 8.4       Past Surgical History:   Procedure Laterality Date     IMPLANT PACEMAKER  2001    Placed a [place] heart [place]     OPEN REDUCTION INTERNAL FIXATION ANKLE Left 4/6/2020    Procedure: Open reduction internal fixation left bimalleolar ankle fracture;  Surgeon: Kaz Apple MD;  Location: [facility] OR     ORTHOPEDIC SURGERY Left     fracture repaired     REPLACE PACEMAKER GENERATOR  2010       Family History   Problem Relation Age of  "Onset     Diabetes Father      Diabetes Sister      Diabetes Brother      Coronary Artery Disease No family hx of      Cerebrovascular Disease No family hx of      Colon Cancer No family hx of      Prostate Cancer No family hx of        Social History     Tobacco Use     Smoking status: Never     Smokeless tobacco: Never   Substance Use Topics     Alcohol use: Yes     Comment: Occas       Diet: regular, low salt/low fat  Physical Activity: active without specific exercise program  Depression Screen:    Over the past 2 weeks, patient has felt down, depressed, or hopeless:  No    Over the past 2 weeks, patient has felt little interest or pleasure in doing things: No    Functional ability/Safety screen:  Up and go test (able to get up and walk longer than 30 seconds): Passed  Patient needs assistance with: nothing  Patient's home has the following possible safety concerns: none identified  Patient has concerns about his hearing:  No  Cognitive Screen  Patient repeats three objects (ball, flag, tree)      Clock drawing test:   NORMAL  Recalls three objects after 3 minutes (ball,flag,tree):                                                                                               recalls 3 objects (3 points)    Physical Exam:  /62 (BP Location: Right arm, Patient Position: Chair, Cuff Size: Adult Large)   Pulse 68   Temp 97.6  F (36.4  C) (Temporal)   Resp 20   Ht 1.88 m (6' 2\")   Wt 121.1 kg (267 lb)   BMI 34.28 kg/m     Body mass index is 34.28 kg/m .                End of Life Planning:   Patient currently has an advanced directive: Yes.  Practitioner is supportive of decision.    Education/Counseling:   Based on review of the above information, the following items were addressed:      Obesity - appropriate counseling on diet, exercise, etc.      Elevated blood pressure - follow-up plans made      Diabetes -  access to diabetes self-management training, medical nutrition therapy, and " treatment    Appropriate preventive services were discussed with this patient, including applicable screening as appropriate for cardiovascular disease, diabetes, osteopenia/osteoporosis, and glaucoma.  As appropriate for age/gender, discussed screening for colorectal cancer, prostate cancer, breast cancer, and cervical cancer.   Checklist reviewing preventive services available has been given to the patient.              Assessment & Plan     Type 2 diabetes mellitus without complication, without long-term current use of insulin (H)  Results for orders placed or performed in visit on 03/07/23 (from the past 24 hour(s))   HEMOGLOBIN A1C (BFP)   Result Value Ref Range    Hemoglobin A1C 7.7 (A) 4.0 - 7.0 %   ALBUMIN RANDOM URINE QUANTITATIVE (BFP)   Result Value Ref Range    Albumin mg/L 30 <30    Creatinine Urine mg/dL 100 <300 mg/dL    Albumin Urine mg/g Cr <30 <30 MG/G Creatinine   Improved somewhat-continue current medications at current doses , work on exercise and weight loss    MTM f/u    - HEMOGLOBIN A1C (BFP)  - VENOUS COLLECTION  - empagliflozin (JARDIANCE) 25 MG TABS tablet  Dispense: 90 tablet; Refill: 3  - glimepiride (AMARYL) 4 MG tablet  Dispense: 90 tablet; Refill: 3  - levothyroxine (SYNTHROID/LEVOTHROID) 50 MCG tablet  Dispense: 90 tablet; Refill: 3  - pioglitazone (ACTOS) 45 MG tablet  Dispense: 90 tablet; Refill: 3  - semaglutide (OZEMPIC, 0.25 OR 0.5 MG/DOSE,) 2 MG/1.5ML SOPN pen  Dispense: 3 mL; Refill: 1  - metFORMIN (GLUCOPHAGE) 500 MG tablet  Dispense: 360 tablet; Refill: 3  - ALBUMIN RANDOM URINE QUANTITATIVE (BFP)    Mixed hyperlipidemia  Control uncertain, continue current medications at current doses pending labs  - Lipid Panel (BFP)  - Comprehensive Metobolic Panel (BFP)    Essential hypertension  , well controlled, continue current medications at current doses   - Comprehensive Metobolic Panel (BFP)    Atherosclerosis of coronary artery of native heart without angina pectoris,  "unspecified vessel or lesion type  stable symptomatically continue current medications at current doses     Paroxysmal atrial fibrillation (H)  Well controlled, continue current medications at current doses     Hypothyroidism, unspecified type  stable symptomatically continue current medications at current doses pending labs  - TSH WITH FREE T4 REFLEX ([facility])    Encounter for annual wellness exam in Medicare patient  discussed preventitive healthcare     Tremor of left hand  Mild intention tremor, no Parkinsonian symptoms , not present on exam today, f/u if not improving or worsening       Review of external notes as documented elsewhere in note  Review of the result(s) of each unique test - labs  Ordering of each unique test  Prescription drug management         BMI:   Estimated body mass index is 34.28 kg/m  as calculated from the following:    Height as of this encounter: 1.88 m (6' 2\").    Weight as of this encounter: 121.1 kg (267 lb).   Weight management plan: Discussed healthy diet and exercise guidelines    Work on weight loss  Regular exercise    Recheck 6 mo    No follow-ups on file.    Lm Garnica MD  [facility]    Natalia Salter is a 77 year old, presenting for the following health issues:  Recheck Medication      HPI     Diabetes Follow-up    How often are you checking your blood sugar? Continuous glucose monitor 120 today  What time of day are you checking your blood sugars (select all that apply)?  Not applicable  Have you had any blood sugars above 200?  some    Have you had any blood sugars below 70?  A few during sleep, mild    What symptoms do you notice when your blood sugar is low?  Shaky    What concerns do you have today about your diabetes? None     Do you have any of these symptoms? (Select all that apply)  Numbness in feet    Have you had a diabetic eye exam in the last 12 months? No                Hyperlipidemia Follow-Up      Are you regularly taking " any medication or supplement to lower your cholesterol?   Yes- rosuvatatin    Are you having muscle aches or other side effects that you think could be caused by your cholesterol lowering medication?  No    Hypertension Follow-up      Do you check your blood pressure regularly outside of the clinic? Yes     Are you following a low salt diet? No    Are your blood pressures ever more than 140 on the top number (systolic) OR more   than 90 on the bottom number (diastolic), for example 140/90? No    BP Readings from Last 2 Encounters:   03/07/23 116/62   09/12/22 110/70     Hemoglobin A1C (%)   Date Value   09/12/2022 7.9 (A)   06/09/2022 7.9 (A)     LDL Cholesterol Direct (mg/dL)   Date Value   09/12/2022 46   01/25/2022 33       Atrial Fibrillation Follow-up      Symptoms: no recent chest pain, significant palpitations, dizziness/lightheadedness, dyspnea, or increased peripheral edema.    Stroke prevention: DOAC (Eliquis, Xarelto, Pradaxa)    Date 9/16/2021 1/25/2022 3/8/2022 9/12/2022 3/7/2023   Pulse 72 60 51 64 68     Current      Vascular Disease Follow-up      How often do you take nitroglycerin? Never    Do you take an aspirin every day? No    Hypothyroidism Follow-up      Since last visit, patient describes the following symptoms: Weight stable, no hair loss, no skin changes, no constipation, no loose stools      How many servings of fruits and vegetables do you eat daily?  2-3    On average, how many sweetened beverages do you drink each day (Examples: soda, juice, sweet tea, etc.  Do NOT count diet or artificially sweetened beverages)?   1    How many days per week do you exercise enough to make your heart beat faster? 3 or less    How many minutes a day do you exercise enough to make your heart beat faster? 20 - 29    How many days per week do you miss taking your medication? 0    Pt notes a tremor with certain lifting left hand, present more than a year, not worsening     Review of Systems   Constitutional,  "HEENT, cardiovascular, pulmonary, gi and gu systems are negative, except as otherwise noted.      Objective    /62 (BP Location: Right arm, Patient Position: Chair, Cuff Size: Adult Large)   Pulse 68   Temp 97.6  F (36.4  C) (Temporal)   Resp 20   Ht 1.88 m (6' 2\")   Wt 121.1 kg (267 lb)   BMI 34.28 kg/m    Body mass index is 34.28 kg/m .  Physical Exam   GENERAL: healthy, alert and no distress  EYES: Eyes grossly normal to inspection, PERRL and conjunctivae and sclerae normal  HENT: ear canals and TM's normal, nose and mouth without ulcers or lesions  NECK: no adenopathy, no asymmetry, masses, or scars and thyroid normal to palpation  RESP: lungs clear to auscultation - no rales, rhonchi or wheezes  CV: regular rate and rhythm, normal S1 S2, no S3 or S4, no murmur, click or rub, no peripheral edema and peripheral pulses strong  ABDOMEN: soft, nontender, no hepatosplenomegaly, no masses and bowel sounds normal  MS: no gross musculoskeletal defects noted, no edema  NEURO: Normal strength and tone, mentation intact and speech normal  PSYCH: mentation appears normal, affect normal/bright    Results for orders placed or performed in visit on 03/07/23 (from the past 24 hour(s))   HEMOGLOBIN A1C (BFP)   Result Value Ref Range    Hemoglobin A1C 7.7 (A) 4.0 - 7.0 %   ALBUMIN RANDOM URINE QUANTITATIVE (BFP)   Result Value Ref Range    Albumin mg/L 30 <30    Creatinine Urine mg/dL 100 <300 mg/dL    Albumin Urine mg/g Cr <30 <30 MG/G Creatinine                        "

## 2023-03-08 LAB
T4, FREE, NON-DIALYSIS - QUEST: 1.2 NG/DL (ref 0.8–1.8)
TSH SERPL-ACNC: 4.56 MIU/L (ref 0.4–4.5)

## 2023-04-26 DIAGNOSIS — E11.9 TYPE 2 DIABETES MELLITUS WITHOUT COMPLICATION, WITHOUT LONG-TERM CURRENT USE OF INSULIN (H): Primary | ICD-10-CM

## 2023-04-26 NOTE — TELEPHONE ENCOUNTER
Gaetano Sheriff is requesting a refill of:    Pending Prescriptions:                       Disp   Refills    semaglutide (OZEMPIC) 2 MG/3ML pen        3 mL   1            Sig: Inject 0.25 mg Subcutaneous every 7 days    Need to change Rx to the 2MG/3ML from 2 MG/1.5ML

## 2023-04-27 ENCOUNTER — OFFICE VISIT (OUTPATIENT)
Dept: FAMILY MEDICINE | Facility: CLINIC | Age: 78
End: 2023-04-27

## 2023-04-27 DIAGNOSIS — E11.9 TYPE 2 DIABETES MELLITUS WITHOUT COMPLICATION, WITHOUT LONG-TERM CURRENT USE OF INSULIN (H): Primary | ICD-10-CM

## 2023-04-27 NOTE — PROGRESS NOTES
Gaetano came in today to discuss Ozempic pricing. It was different than it has previously been.    Pharmacy dispensed one pen for $80. He used to get two pens for $90.     Talked to pharmacy who agreed to rerun as 2 pens with new prescriptions. Came up to a much higher price. Likely in the donut hole.    He will stick with one month for now and try to refill when running low.    Sol Escobar, PharmD  Clinical Pharmacist  [facility]  General Clinic Phone: [phone]  Direct Office Phone: [phone]

## 2023-06-18 ENCOUNTER — HEALTH MAINTENANCE LETTER (OUTPATIENT)
Age: 78
End: 2023-06-18

## 2023-08-03 ENCOUNTER — OFFICE VISIT (OUTPATIENT)
Dept: FAMILY MEDICINE | Facility: CLINIC | Age: 78
End: 2023-08-03

## 2023-08-03 DIAGNOSIS — E11.9 TYPE 2 DIABETES MELLITUS WITHOUT COMPLICATION, WITHOUT LONG-TERM CURRENT USE OF INSULIN (H): Primary | ICD-10-CM

## 2023-08-03 NOTE — PROGRESS NOTES
SUBJECTIVE/OBJECTIVE:                Gaetano Sheriff is a 77 year old male seen for a follow-up visit for Medication Management Services.  He was referred to me from Dr. Lm Garnica.     Chief Complaint: Follow up from [facility] visit on 04/27/2023.      Diabetes:  Current Medications:  Current Outpatient Medications   Medication    ACCU-CHEK MICHAEL PLUS test strip    apixaban ANTICOAGULANT (ELIQUIS) 5 MG tablet    Continuous Blood Gluc Sensor (FREESTYLE ABBI 14 DAY SENSOR) MISC    Continuous Blood Gluc Sensor (FREESTYLE ABBI 14 DAY SENSOR) MISC    Continuous Blood Gluc Sensor (FREESTYLE ABBI 14 DAY SENSOR) MISC    empagliflozin (JARDIANCE) 25 MG TABS tablet    glimepiride (AMARYL) 4 MG tablet    insulin pen needle (32G X 6 MM) 32G X 6 MM miscellaneous    levothyroxine (SYNTHROID/LEVOTHROID) 50 MCG tablet    losartan (COZAAR) 50 MG tablet    metFORMIN (GLUCOPHAGE) 500 MG tablet    metoprolol succinate ER (TOPROL-XL) 25 MG 24 hr tablet    nitroGLYcerin (NITROSTAT) 0.4 MG sublingual tablet    pioglitazone (ACTOS) 45 MG tablet    rosuvastatin (CRESTOR) 20 MG tablet    semaglutide (OZEMPIC) 2 MG/3ML pen     No current facility-administered medications for this visit.     We discussed the benefits and risks of each medication.  Patient Questions/Concerns:  Diarrhea    ASSESSMENT/PLAN:                Diabetes:  Assessment: Patient discontinued Ozempic two weeks ago to see if this may be leading to some diarrhea he has been experiencing. Stools have been loose and notes urgency when occurs. While off Ozempic, noticed slight decrease in occurrences, but did not resolve.     Patient has noticed increase in BG while off of Ozempic as well. Fasting level this morning was 140 and has noticed some spikes after meals into the mid to high 200s.    Metformin could be contributing to this side effect. Discussed reinitiating Ozempic and decreasing metformin to 1 tablet twice daily. Patient agrees with this plan. If resolution,  will remain on decreased dose of metformin. If not resolved, will continue to taper back on metformin.    Diet is well controlled. No change in activity. No illness.     Patient will return in two weeks to discuss further taper.    Status: Diabetes control slipping.    Drug Therapy Problems:  1) Metformin likely contributing to loose stools  2) BG control not at goal with discontinuation of Ozempic    Plan:  1) Decrease metformin to 1 tab BID.   2) Reinitiate Ozempic at 0.25mg weekly  3) Follow up in 2 weeks to see if resolution. Will determine further taper at that time.        I spent 1 hour with this patient today.  All changes were made via collaborative practice agreement with Lm Garnica. A copy of the visit note was provided to the patient's primary care provider.    Sol Escobar, PharmD  Clinical Pharmacist  [facility]  General Clinic Phone: [phone]  Direct Office Phone: [phone]

## 2023-08-04 RX ORDER — FLASH GLUCOSE SENSOR
KIT MISCELLANEOUS
Qty: 2 EACH | Refills: 5 | Status: SHIPPED | OUTPATIENT
Start: 2023-08-04 | End: 2024-02-14

## 2023-08-17 ENCOUNTER — OFFICE VISIT (OUTPATIENT)
Dept: FAMILY MEDICINE | Facility: CLINIC | Age: 78
End: 2023-08-17

## 2023-08-17 DIAGNOSIS — E11.9 TYPE 2 DIABETES MELLITUS WITHOUT COMPLICATION, WITHOUT LONG-TERM CURRENT USE OF INSULIN (H): Primary | ICD-10-CM

## 2023-08-17 RX ORDER — TIRZEPATIDE 2.5 MG/.5ML
2.5 INJECTION, SOLUTION SUBCUTANEOUS
COMMUNITY
Start: 2023-08-17 | End: 2024-02-13

## 2023-08-17 NOTE — PROGRESS NOTES
SUBJECTIVE/OBJECTIVE:                Gaetano Sheriff is a 77 year old male seen for a follow-up visit for Medication Management Services.  He was referred to me from Dr. Lm Garnica.     Chief Complaint: Follow up from [facility] visit on 08/03/23.      Diabetes:  Current Medications:  Current Outpatient Medications   Medication    tirzepatide (MOUNJARO) 2.5 MG/0.5ML pen    apixaban ANTICOAGULANT (ELIQUIS) 5 MG tablet    Continuous Blood Gluc Sensor (FREESTYLE ABBI 14 DAY SENSOR) MISC    empagliflozin (JARDIANCE) 25 MG TABS tablet    glimepiride (AMARYL) 4 MG tablet    insulin pen needle (32G X 6 MM) 32G X 6 MM miscellaneous    levothyroxine (SYNTHROID/LEVOTHROID) 50 MCG tablet    losartan (COZAAR) 50 MG tablet    metFORMIN (GLUCOPHAGE) 500 MG tablet    metoprolol succinate ER (TOPROL-XL) 25 MG 24 hr tablet    nitroGLYcerin (NITROSTAT) 0.4 MG sublingual tablet    pioglitazone (ACTOS) 45 MG tablet    rosuvastatin (CRESTOR) 20 MG tablet    semaglutide (OZEMPIC) 2 MG/3ML pen     No current facility-administered medications for this visit.       We discussed the benefits and risks of each medication.  Patient Questions/Concerns:  Follow up after decreasing metformin, reintroducing Ozempic    ASSESSMENT/PLAN:                Diabetes:  Assessment: Patient was in to discuss loose BM pattern two weeks ago. He had discontinued Ozempic at this time thinking it could be due to the Ozempic. Was not resolved with discontinuation of Ozempic.    Discussed this potentially could be caused by metformin. Decreased metformin to 1 tablet twice daily. Since decrease, diarrhea has improved greatly.    Since medication changes, patients average blood glucose has decreased slightly, but is still elevated at 184. Fasting and post prandial levels have both been above goal.    I recommend patient stay at this dose of metformin and increase to 0.5mg of Ozempic to use up the medication he currently has at home. I would like for patient to  try Mounjaro, starting at 2.5mg weekly. The coverage looks the same for Mounjaro with insurance. I would like to see if we could get greater A1c reduction with Mounjaro. Sent patient home with a sample pack of Mounjaro to start when out of Ozempic.    Status: Diabetes control slipping  Drug Therapy Problems:  1) Diarrhea due to metformin  2) Additional blood glucose control needed  Plan:  1) Continue with decreased dose of metformin 500mg BID  2) Increase Ozempic to 0.5mg weekly until out of medication. When next dose is due, start with Mounjaro 2.5mg weekly.      I spent 45 minutes with this patient today.  All changes were made via collaborative practice agreement with Lm Garnica. A copy of the visit note was provided to the patient's primary care provider.    Sol Escobar, PharmD  Clinical Pharmacist  [facility]  General Clinic Phone: [phone]  Direct Office Phone: [phone]

## 2023-09-28 ENCOUNTER — OFFICE VISIT (OUTPATIENT)
Dept: FAMILY MEDICINE | Facility: CLINIC | Age: 78
End: 2023-09-28

## 2023-09-28 DIAGNOSIS — E11.9 TYPE 2 DIABETES MELLITUS WITHOUT COMPLICATION, WITHOUT LONG-TERM CURRENT USE OF INSULIN (H): Primary | ICD-10-CM

## 2023-11-05 ENCOUNTER — HEALTH MAINTENANCE LETTER (OUTPATIENT)
Age: 78
End: 2023-11-05

## 2024-02-12 ENCOUNTER — TELEPHONE (OUTPATIENT)
Dept: FAMILY MEDICINE | Facility: CLINIC | Age: 79
End: 2024-02-12

## 2024-02-12 DIAGNOSIS — E11.9 TYPE 2 DIABETES MELLITUS WITHOUT COMPLICATION, WITHOUT LONG-TERM CURRENT USE OF INSULIN (H): ICD-10-CM

## 2024-02-12 RX ORDER — FLASH GLUCOSE SENSOR
KIT MISCELLANEOUS
Qty: 2 EACH | Refills: 5 | Status: CANCELLED | OUTPATIENT
Start: 2024-02-12

## 2024-02-12 NOTE — TELEPHONE ENCOUNTER
Gaetano Sheriff is requesting a refill of:    Pending Prescriptions:                       Disp   Refills    Continuous Blood Gluc Sensor (FREESTYLE L*2 each 5            Sig: Change every 14 days.    Please close encounter if RX was sent. Thanks, Jaz

## 2024-02-13 ENCOUNTER — OFFICE VISIT (OUTPATIENT)
Dept: FAMILY MEDICINE | Facility: CLINIC | Age: 79
End: 2024-02-13

## 2024-02-13 ENCOUNTER — TELEPHONE (OUTPATIENT)
Dept: FAMILY MEDICINE | Facility: CLINIC | Age: 79
End: 2024-02-13

## 2024-02-13 VITALS
BODY MASS INDEX: 34.14 KG/M2 | HEART RATE: 64 BPM | WEIGHT: 266 LBS | HEIGHT: 74 IN | TEMPERATURE: 97.2 F | RESPIRATION RATE: 20 BRPM | SYSTOLIC BLOOD PRESSURE: 120 MMHG | DIASTOLIC BLOOD PRESSURE: 64 MMHG

## 2024-02-13 DIAGNOSIS — Z13.89 SCREENING FOR DIABETIC PERIPHERAL NEUROPATHY: ICD-10-CM

## 2024-02-13 DIAGNOSIS — I25.10 ATHEROSCLEROSIS OF CORONARY ARTERY OF NATIVE HEART WITHOUT ANGINA PECTORIS, UNSPECIFIED VESSEL OR LESION TYPE: ICD-10-CM

## 2024-02-13 DIAGNOSIS — Z11.59 SCREENING FOR VIRAL DISEASE: ICD-10-CM

## 2024-02-13 DIAGNOSIS — E78.2 MIXED HYPERLIPIDEMIA: ICD-10-CM

## 2024-02-13 DIAGNOSIS — I48.0 PAROXYSMAL ATRIAL FIBRILLATION (H): ICD-10-CM

## 2024-02-13 DIAGNOSIS — E03.9 HYPOTHYROIDISM, UNSPECIFIED TYPE: ICD-10-CM

## 2024-02-13 DIAGNOSIS — I10 ESSENTIAL HYPERTENSION: ICD-10-CM

## 2024-02-13 DIAGNOSIS — E11.9 TYPE 2 DIABETES MELLITUS WITHOUT COMPLICATION, WITHOUT LONG-TERM CURRENT USE OF INSULIN (H): ICD-10-CM

## 2024-02-13 DIAGNOSIS — E11.9 TYPE 2 DIABETES MELLITUS WITHOUT COMPLICATION, WITHOUT LONG-TERM CURRENT USE OF INSULIN (H): Primary | ICD-10-CM

## 2024-02-13 LAB
ALBUMIN (URINE) MG/L: 80
ALBUMIN SERPL-MCNC: 4 G/DL (ref 3.6–5.1)
ALBUMIN URINE MG/G CR: <30 MG/G CREATININE
ALBUMIN/GLOB SERPL: 1.3 {RATIO} (ref 1–2.5)
ALP SERPL-CCNC: 54 U/L (ref 33–130)
ALT 1742-6: 11 U/L (ref 0–32)
AST 1920-8: 13 U/L (ref 0–35)
BILIRUB SERPL-MCNC: 1 MG/DL (ref 0.2–1.2)
BUN SERPL-MCNC: 27 MG/DL (ref 7–25)
BUN/CREATININE RATIO: 18.8 (ref 6–32)
CALCIUM SERPL-MCNC: 9.2 MG/DL (ref 8.6–10.3)
CHLORIDE SERPLBLD-SCNC: 106 MMOL/L (ref 98–110)
CHOLEST SERPL-MCNC: 131 MG/DL (ref 0–199)
CHOLEST/HDLC SERPL: 2 {RATIO} (ref 0–5)
CO2 SERPL-SCNC: 29.4 MMOL/L (ref 20–32)
CREAT SERPL-MCNC: 1.44 MG/DL (ref 0.6–1.3)
CREATININE URINE MG/DL: 200 MG/DL
GFR SERPL CREATININE-BSD FRML MDRD: 49 ML/MIN/1.73M2
GLOBULIN, CALCULATED - QUEST: 3.1 (ref 1.9–3.7)
GLUCOSE SERPL-MCNC: 126 MG/DL (ref 60–99)
HBA1C MFR BLD: 7.6 % (ref 4–5.6)
HDLC SERPL-MCNC: 69 MG/DL (ref 40–150)
LDLC SERPL CALC-MCNC: 43 MG/DL (ref 0–130)
POTASSIUM SERPL-SCNC: 4.72 MMOL/L (ref 3.5–5.3)
PROT SERPL-MCNC: 7.1 G/DL (ref 6.1–8.1)
SODIUM SERPL-SCNC: 144.1 MMOL/L (ref 135–146)
TRIGL SERPL-MCNC: 97 MG/DL (ref 0–149)

## 2024-02-13 PROCEDURE — 82043 UR ALBUMIN QUANTITATIVE: CPT | Performed by: FAMILY MEDICINE

## 2024-02-13 PROCEDURE — 36415 COLL VENOUS BLD VENIPUNCTURE: CPT | Performed by: FAMILY MEDICINE

## 2024-02-13 PROCEDURE — 80053 COMPREHEN METABOLIC PANEL: CPT | Performed by: FAMILY MEDICINE

## 2024-02-13 PROCEDURE — G2211 COMPLEX E/M VISIT ADD ON: HCPCS | Performed by: FAMILY MEDICINE

## 2024-02-13 PROCEDURE — 83036 HEMOGLOBIN GLYCOSYLATED A1C: CPT | Performed by: FAMILY MEDICINE

## 2024-02-13 PROCEDURE — 99214 OFFICE O/P EST MOD 30 MIN: CPT | Performed by: FAMILY MEDICINE

## 2024-02-13 PROCEDURE — 80061 LIPID PANEL: CPT | Performed by: FAMILY MEDICINE

## 2024-02-13 PROCEDURE — 82570 ASSAY OF URINE CREATININE: CPT | Performed by: FAMILY MEDICINE

## 2024-02-13 RX ORDER — TIRZEPATIDE 2.5 MG/.5ML
2.5 INJECTION, SOLUTION SUBCUTANEOUS
COMMUNITY
Start: 2024-02-13 | End: 2024-03-26

## 2024-02-13 RX ORDER — PIOGLITAZONEHYDROCHLORIDE 45 MG/1
45 TABLET ORAL DAILY
Qty: 90 TABLET | Refills: 3 | Status: SHIPPED | OUTPATIENT
Start: 2024-02-13

## 2024-02-13 RX ORDER — GLIMEPIRIDE 4 MG/1
4 TABLET ORAL
Qty: 90 TABLET | Refills: 3 | Status: SHIPPED | OUTPATIENT
Start: 2024-02-13

## 2024-02-13 RX ORDER — LEVOTHYROXINE SODIUM 50 UG/1
50 TABLET ORAL DAILY
Qty: 90 TABLET | Refills: 3 | Status: SHIPPED | OUTPATIENT
Start: 2024-02-13

## 2024-02-13 NOTE — TELEPHONE ENCOUNTER
Pt continuous glucose monitor was not sent in during his visit today, can this be sent in so he can  tomorrow? Informed pt [facility] out of clinic this evening, and I would route message for tomorrow morning.     Pt can be reached at home number if needed. Routing to Annita and [facility]

## 2024-02-13 NOTE — NURSING NOTE
Gaetano CAN Sheriff is here for a diabetic check and medication refill.    Questioned patient about current smoking habits.  Pt. has never smoked.  Body mass index is 34.15 kg/m .  PULSE regular  My Chart: active  CLASSIFICATION OF OVERWEIGHT AND OBESITY BY BMI                        Obesity Class           BMI(kg/m2)  Underweight                                    < 18.5  Normal                                         18.5-24.9  Overweight                                     25.0-29.9  OBESITY                     I                  30.0-34.9                             II                 35.0-39.9  EXTREME OBESITY             III                >40                            Patient's  BMI Body mass index is 34.15 kg/m .  http://hin.nhlbi.nih.gov/menuplanner/menu.cgi    Pre-visit planning  Immunizations - up to date  Colonoscopy -   Mammogram -   Asthma -   PHQ9 -    SHERWIN-7 -      The patient has verbalized that it is ok to leave a detailed voice message on the patient's cell phone with results/recommendations from this visit.

## 2024-02-13 NOTE — PROGRESS NOTES
"  Assessment & Plan     Type 2 diabetes mellitus without complication, without long-term current use of insulin (H)  Improving, pt had erratic sugars after flu illness, now better, continue current medications at current doses   - HEMOGLOBIN A1C (BFP)  - VENOUS COLLECTION    Mixed hyperlipidemia  Control uncertain, continue current medications at current doses pending labs    Essential hypertension  Well controlled, continue current medications at current doses     Atherosclerosis of coronary artery of native heart without angina pectoris, unspecified vessel or lesion type  Stable, continue current medications at current doses     Paroxysmal atrial fibrillation (H)  Well controlled, continue current medications at current doses     Hypothyroidism, unspecified type  stable symptomatically continue current medications at current doses pending labs    Screening for diabetic peripheral neuropathy      Screening for viral disease  Per cdc      Review of the result(s) of each unique test - labs  Ordering of each unique test  Prescription drug management        BMI  Estimated body mass index is 34.15 kg/m  as calculated from the following:    Height as of this encounter: 1.88 m (6' 2\").    Weight as of this encounter: 120.7 kg (266 lb).   Weight management plan: Discussed healthy diet and exercise guidelines      FUTURE APPOINTMENTS:       - Follow-up visit in 6 mo  Work on weight loss  Regular exercise    No follow-ups on file.    Natalia Salter is a 78 year old, presenting for the following health issues:  Recheck Medication    HPI       Diabetes Follow-up    How often are you checking your blood sugar? Continuous glucose monitor  What time of day are you checking your blood sugars (select all that apply)?  Not applicable  Have you had any blood sugars above 200?  Yes yes a few  Have you had any blood sugars below 70?  A couple high 60's  What symptoms do you notice when your blood sugar is low?  Shaky  What " concerns do you have today about your diabetes? None   Do you have any of these symptoms? (Select all that apply)  No numbness or tingling in feet.  No redness, sores or blisters on feet.  No complaints of excessive thirst.  No reports of blurry vision.  No significant changes to weight.  Have you had a diabetic eye exam in the last 12 months? yes            Hyperlipidemia Follow-Up    Are you regularly taking any medication or supplement to lower your cholesterol?   Yes- crestor   Are you having muscle aches or other side effects that you think could be caused by your cholesterol lowering medication?  No    Hypertension Follow-up    Do you check your blood pressure regularly outside of the clinic? Yes   Are you following a low salt diet? No  Are your blood pressures ever more than 140 on the top number (systolic) OR more   than 90 on the bottom number (diastolic), for example 140/90? No    BP Readings from Last 2 Encounters:   02/13/24 120/64   03/07/23 116/62     Hemoglobin A1C (%)   Date Value   03/07/2023 7.7 (A)   09/12/2022 7.9 (A)     LDL Cholesterol Direct (mg/dL)   Date Value   03/07/2023 39   09/12/2022 46         Atrial Fibrillation Follow-up    Symptoms: no recent chest pain, significant palpitations, dizziness/lightheadedness, dyspnea, or increased peripheral edema.  Stroke prevention: DOAC (Eliquis, Xarelto, Pradaxa)        1/25/2022     9:02 AM 3/8/2022    12:24 PM 9/12/2022     9:15 AM 3/7/2023     7:22 AM 2/13/2024     9:35 AM   Date   Pulse 60 51 64 68 64     Current        Vascular Disease Follow-up    How often do you take nitroglycerin? Never  Do you take an aspirin every day? No    Hypothyroidism Follow-up    Since last visit, patient describes the following symptoms: Weight stable, no hair loss, no skin changes, no constipation, no loose stools  How many servings of fruits and vegetables do you eat daily?  2-3  On average, how many sweetened beverages do you drink each day (Examples: soda,  "juice, sweet tea, etc.  Do NOT count diet or artificially sweetened beverages)?   1  How many days per week do you exercise enough to make your heart beat faster? 4  How many minutes a day do you exercise enough to make your heart beat faster? 10 - 19  How many days per week do you miss taking your medication? 0        Review of Systems  Constitutional, HEENT, cardiovascular, pulmonary, gi and gu systems are negative, except as otherwise noted.      Objective    /64 (BP Location: Right arm, Patient Position: Chair, Cuff Size: Adult Large)   Pulse 64   Temp 97.2  F (36.2  C) (Temporal)   Resp 20   Ht 1.88 m (6' 2\")   Wt 120.7 kg (266 lb)   BMI 34.15 kg/m    Body mass index is 34.15 kg/m .  Physical Exam   GENERAL: alert and no distress  EYES: Eyes grossly normal to inspection, PERRL and conjunctivae and sclerae normal  HENT: ear canals and TM's normal, nose and mouth without ulcers or lesions  NECK: no adenopathy, no asymmetry, masses, or scars  RESP: lungs clear to auscultation - no rales, rhonchi or wheezes  CV: regular rate and rhythm, normal S1 S2, no S3 or S4, no murmur, click or rub, no peripheral edema  ABDOMEN: soft, nontender, no hepatosplenomegaly, no masses and bowel sounds normal  MS: no gross musculoskeletal defects noted, no edema  PSYCH: mentation appears normal, affect normal/bright  Diabetic foot exam: normal DP and PT pulses, no trophic changes or ulcerative lesions, and normal sensory exam    Results for orders placed or performed in visit on 02/13/24 (from the past 24 hour(s))   HEMOGLOBIN A1C (BFP)   Result Value Ref Range    Hemoglobin A1C 7.6 (A) 4.0 - 5.6 %           Signed Electronically by: Lm Garnica MD      "

## 2024-02-13 NOTE — TELEPHONE ENCOUNTER
Gaetano Sheriff is requesting a refill of:    Pending Prescriptions:                       Disp   Refills    metFORMIN (GLUCOPHAGE) 500 MG tablet      90 tab*3            Sig: Take 1 tablet (500 mg) by mouth 2 times daily           (with meals)    Please close encounter if RX was sent. Thanks, Jaz

## 2024-02-14 DIAGNOSIS — E11.9 TYPE 2 DIABETES MELLITUS WITHOUT COMPLICATION, WITHOUT LONG-TERM CURRENT USE OF INSULIN (H): ICD-10-CM

## 2024-02-14 LAB
HCV AB - QUEST: NORMAL
TSH SERPL-ACNC: 3.55 MIU/L (ref 0.4–4.5)

## 2024-02-14 RX ORDER — FLASH GLUCOSE SENSOR
KIT MISCELLANEOUS
Qty: 2 EACH | Refills: 5 | Status: SHIPPED | OUTPATIENT
Start: 2024-02-14 | End: 2024-02-14

## 2024-02-14 RX ORDER — FLASH GLUCOSE SENSOR
KIT MISCELLANEOUS
Qty: 2 EACH | Refills: 5 | Status: SHIPPED | OUTPATIENT
Start: 2024-02-14

## 2024-02-14 NOTE — TELEPHONE ENCOUNTER
Gaetano wants his Freestyle Mendel 14 day sensors sent to [facility] in [place], not [facility].    Typically [place] would call [facility] for a transfer but it becomes difficult with Medicare billing purposes.    Dr. Garnica, could you resend?    Thanks, Jennifer      Pending Prescriptions:                       Disp   Refills    Continuous Blood Gluc Sensor (FREESTYLE L*2 each 5            Sig: Change every 14 days.    Signed Prescriptions:                        Disp   Refills    metFORMIN (GLUCOPHAGE) 500 MG tablet       90 tab*3        Sig: Take 1 tablet (500 mg) by mouth 2 times daily (with           meals)  Authorizing Provider: JAKE GARNICA

## 2024-02-14 NOTE — ADDENDUM NOTE
"  Mr. Urbina is 75 y.o. male    CHIEF COMPLAINT: I am here to follow up on my prostate cancer    HPI  Patient is here today for follow-up cancer of prostate.  This was diagnosed 7 years ago.  He had an elevated PSA so he underwent biopsy.  He has what appears to be organ confined low risk disease.  Remission status is been achieved with robot-assisted lap scopic prostatectomy.  He denies any incontinence or hematuria.  His most bothersome symptom is nocturia because he gets up at night sometimes up to 3 times.  That improves with limiting hydration in the evenings.    Lab Results   Component Value Date    PSA <0.070 03/26/2018    PSA <0.070 03/22/2017       The following portions of the patient's history were reviewed and updated as appropriate: allergies, current medications, past family history, past medical history, past social history, past surgical history and problem list.      Review of Systems   Constitutional: Negative for chills and fever.   Gastrointestinal: Negative for abdominal pain, anal bleeding and blood in stool.   Genitourinary: Negative for flank pain and hematuria.     Current Outpatient Prescriptions:   •  fluticasone (FLONASE) 50 MCG/ACT nasal spray, 1 spray into each nostril Daily., Disp: 1 bottle, Rfl: 0    Past Medical History:   Diagnosis Date   • Prostate cancer 05/2011    t2c,[place] 3+3       Past Surgical History:   Procedure Laterality Date   • HERNIA REPAIR     • PROSTATECTOMY  05/2011    RALP       Social History     Social History   • Marital status: Single     Social History Main Topics   • Smoking status: Former Smoker     Types: Cigarettes     Quit date: 6/8/2002   • Smokeless tobacco: Never Used   • Alcohol use No   • Drug use: Unknown     Other Topics Concern   • Not on file       Family History   Problem Relation Age of Onset   • Stroke Mother          Temp 98 °F (36.7 °C)   Ht 185.4 cm (73\")   Wt 63.5 kg (140 lb)   BMI 18.47 kg/m²       Physical Exam  Alert and oriented " Addended by: LYLE EID on: 2/14/2024 03:16 PM     Modules accepted: Orders     ×3  Not agitated or distressed  No obvious deformities  No respiratory distress  Skin without pallor or diaphoresis      Data  Results for orders placed or performed in visit on 04/02/18   POC Urinalysis Dipstick, Automated   Result Value Ref Range    Color Yellow Yellow, Straw, Dark Yellow, Page    Clarity, UA Clear Clear    Glucose, UA Negative Negative, 1000 mg/dL (3+) mg/dL    Bilirubin Negative Negative    Ketones, UA Negative Negative    Specific Gravity  1.015 1.005 - 1.030    Blood, UA Negative Negative    pH, Urine 6.0 5.0 - 8.0    Protein, POC Negative Negative mg/dL    Urobilinogen, UA Normal Normal    Leukocytes Negative Negative    Nitrite, UA Negative Negative       Assessment and Plan  Diagnoses and all orders for this visit:    Prostate cancer  -     POC Urinalysis Dipstick, Automated  -     PSA DIAGNOSTIC; Future    Nocturia    The PSA is <0.07 which is undetectable suggesting no clinical evidence of prostate cancer   Nocturia is persistent but no worse.           Yuriy Spears MD  04/02/18  11:39 AM      Cc: NKP

## 2024-03-26 DIAGNOSIS — E11.9 TYPE 2 DIABETES MELLITUS WITHOUT COMPLICATION, WITHOUT LONG-TERM CURRENT USE OF INSULIN (H): Primary | ICD-10-CM

## 2024-03-26 RX ORDER — TIRZEPATIDE 2.5 MG/.5ML
2.5 INJECTION, SOLUTION SUBCUTANEOUS
Qty: 2 ML | Refills: 3 | Status: SHIPPED | OUTPATIENT
Start: 2024-03-26 | End: 2024-08-01

## 2024-03-26 NOTE — TELEPHONE ENCOUNTER
Patient needing refill of Mounjaro 2.5mg. Due for follow up  with PCP in August.     Sol Escobar, PharmD  Clinical Pharmacist  [facility]  General Clinic Phone: [phone]  Direct Office Phone: [phone]

## 2024-06-02 ENCOUNTER — HEALTH MAINTENANCE LETTER (OUTPATIENT)
Age: 79
End: 2024-06-02

## 2024-06-17 PROBLEM — Z76.89 HEALTH CARE HOME: Status: RESOLVED | Noted: 2020-04-02 | Resolved: 2024-06-17

## 2024-07-06 ENCOUNTER — APPOINTMENT (OUTPATIENT)
Dept: GENERAL RADIOLOGY | Facility: CLINIC | Age: 79
End: 2024-07-06
Attending: EMERGENCY MEDICINE
Payer: MEDICARE

## 2024-07-06 ENCOUNTER — HOSPITAL ENCOUNTER (EMERGENCY)
Facility: CLINIC | Age: 79
Discharge: HOME | End: 2024-07-06
Attending: EMERGENCY MEDICINE | Admitting: EMERGENCY MEDICINE
Payer: MEDICARE

## 2024-07-06 VITALS
RESPIRATION RATE: 15 BRPM | OXYGEN SATURATION: 95 % | BODY MASS INDEX: 33.27 KG/M2 | HEART RATE: 95 BPM | DIASTOLIC BLOOD PRESSURE: 71 MMHG | SYSTOLIC BLOOD PRESSURE: 137 MMHG | WEIGHT: 259.26 LBS | HEIGHT: 74 IN | TEMPERATURE: 97.6 F

## 2024-07-06 DIAGNOSIS — R05.1 ACUTE COUGH: ICD-10-CM

## 2024-07-06 LAB
ANION GAP SERPL CALCULATED.3IONS-SCNC: 13 MMOL/L (ref 7–15)
BASOPHILS # BLD AUTO: 0 10E3/UL (ref 0–0.2)
BASOPHILS NFR BLD AUTO: 0 %
BUN SERPL-MCNC: 22.2 MG/DL (ref 8–23)
CALCIUM SERPL-MCNC: 9.4 MG/DL (ref 8.8–10.2)
CHLORIDE SERPL-SCNC: 100 MMOL/L (ref 98–107)
CREAT SERPL-MCNC: 1.03 MG/DL (ref 0.67–1.17)
DEPRECATED HCO3 PLAS-SCNC: 24 MMOL/L (ref 22–29)
EGFRCR SERPLBLD CKD-EPI 2021: 74 ML/MIN/1.73M2
EOSINOPHIL # BLD AUTO: 0.1 10E3/UL (ref 0–0.7)
EOSINOPHIL NFR BLD AUTO: 3 %
ERYTHROCYTE [DISTWIDTH] IN BLOOD BY AUTOMATED COUNT: 14 % (ref 10–15)
FLUAV RNA SPEC QL NAA+PROBE: NEGATIVE
FLUBV RNA RESP QL NAA+PROBE: NEGATIVE
GLUCOSE SERPL-MCNC: 238 MG/DL (ref 70–99)
GROUP A STREP BY PCR: NOT DETECTED
HCT VFR BLD AUTO: 39.3 % (ref 40–53)
HGB BLD-MCNC: 12.4 G/DL (ref 13.3–17.7)
HOLD SPECIMEN: NORMAL
HOLD SPECIMEN: NORMAL
IMM GRANULOCYTES # BLD: 0 10E3/UL
IMM GRANULOCYTES NFR BLD: 0 %
LYMPHOCYTES # BLD AUTO: 0.8 10E3/UL (ref 0.8–5.3)
LYMPHOCYTES NFR BLD AUTO: 17 %
MCH RBC QN AUTO: 29.5 PG (ref 26.5–33)
MCHC RBC AUTO-ENTMCNC: 31.6 G/DL (ref 31.5–36.5)
MCV RBC AUTO: 93 FL (ref 78–100)
MONOCYTES # BLD AUTO: 0.7 10E3/UL (ref 0–1.3)
MONOCYTES NFR BLD AUTO: 13 %
NEUTROPHILS # BLD AUTO: 3.4 10E3/UL (ref 1.6–8.3)
NEUTROPHILS NFR BLD AUTO: 67 %
NRBC # BLD AUTO: 0 10E3/UL
NRBC BLD AUTO-RTO: 0 /100
PLATELET # BLD AUTO: 153 10E3/UL (ref 150–450)
POTASSIUM SERPL-SCNC: 4.3 MMOL/L (ref 3.4–5.3)
RBC # BLD AUTO: 4.21 10E6/UL (ref 4.4–5.9)
RSV RNA SPEC NAA+PROBE: NEGATIVE
SARS-COV-2 RNA RESP QL NAA+PROBE: NEGATIVE
SODIUM SERPL-SCNC: 137 MMOL/L (ref 135–145)
WBC # BLD AUTO: 5.1 10E3/UL (ref 4–11)

## 2024-07-06 PROCEDURE — 85025 COMPLETE CBC W/AUTO DIFF WBC: CPT | Performed by: EMERGENCY MEDICINE

## 2024-07-06 PROCEDURE — 87637 SARSCOV2&INF A&B&RSV AMP PRB: CPT | Performed by: EMERGENCY MEDICINE

## 2024-07-06 PROCEDURE — 80048 BASIC METABOLIC PNL TOTAL CA: CPT | Performed by: EMERGENCY MEDICINE

## 2024-07-06 PROCEDURE — 87651 STREP A DNA AMP PROBE: CPT | Performed by: EMERGENCY MEDICINE

## 2024-07-06 PROCEDURE — 93005 ELECTROCARDIOGRAM TRACING: CPT

## 2024-07-06 PROCEDURE — 71046 X-RAY EXAM CHEST 2 VIEWS: CPT

## 2024-07-06 PROCEDURE — 36415 COLL VENOUS BLD VENIPUNCTURE: CPT | Performed by: EMERGENCY MEDICINE

## 2024-07-06 PROCEDURE — 99285 EMERGENCY DEPT VISIT HI MDM: CPT | Mod: 25

## 2024-07-06 RX ORDER — DOXYCYCLINE 100 MG/1
100 CAPSULE ORAL 2 TIMES DAILY
Qty: 20 CAPSULE | Refills: 0 | Status: SHIPPED | OUTPATIENT
Start: 2024-07-06 | End: 2024-07-16

## 2024-07-06 ASSESSMENT — COLUMBIA-SUICIDE SEVERITY RATING SCALE - C-SSRS
6. HAVE YOU EVER DONE ANYTHING, STARTED TO DO ANYTHING, OR PREPARED TO DO ANYTHING TO END YOUR LIFE?: NO
1. IN THE PAST MONTH, HAVE YOU WISHED YOU WERE DEAD OR WISHED YOU COULD GO TO SLEEP AND NOT WAKE UP?: NO
2. HAVE YOU ACTUALLY HAD ANY THOUGHTS OF KILLING YOURSELF IN THE PAST MONTH?: NO

## 2024-07-06 ASSESSMENT — ACTIVITIES OF DAILY LIVING (ADL)
ADLS_ACUITY_SCORE: 35
ADLS_ACUITY_SCORE: 35

## 2024-07-06 NOTE — ED TRIAGE NOTES
Pt comes in with cough when laying down for last week headache, body aches, productive cough, sore throat, tight chest, weakness, diarrhea and light headed for the last week and has been progressively getting worse. Pt wife was seen in the clinic last week dx. With pneumonia and started on a antibiotic. Pt took a COVID test 4 days and it was negative.

## 2024-07-06 NOTE — ED PROVIDER NOTES
Emergency Department Note      History of Present Illness     Chief Complaint   URI      HPI   Gaetano Sheriff is a 78 year old male who presents with wife for evaluation of a productive cough. The patient reports onset began on 6/25 and has been worsening over time. The cough has been producing phlegm. He also endorses chest tightness and diarrhea. The patient took a Covid test 4 days ago which came back negative. The wife notes that she was recently diagnosed with pneumonia and has been taking amoxicillin to treat it. The patient adds that his appetite has decreased as well. Denies fever, chills, vomiting, body aches, and chest pain.    Independent Historian   Wife reports a portion of the information noted in the history above.    Review of External Notes       Past Medical History     Medical History and Problem List   Past Medical History:   Diagnosis Date    Acquired hypothyroidism     Arrhythmia     Atherosclerotic heart disease     Chronic infection     Diabetes mellitus (H)     Essential hypertension     Hyperlipidemia     Hypertension     Mixed hyperlipidemia     SSS (sick sinus syndrome) (H)     Syncope 2001    Type 2 diabetes mellitus without complication, without long-term current use of insulin (H) 04/02/2020    Uncontrolled type 2 diabetes mellitus with hyperglycemia (H) 01/10/2019       Medications   apixaban ANTICOAGULANT (ELIQUIS) 5 MG tablet  Continuous Blood Gluc Sensor (FREESTYLE ABBI 14 DAY SENSOR) MISC  empagliflozin (JARDIANCE) 25 MG TABS tablet  glimepiride (AMARYL) 4 MG tablet  insulin pen needle (32G X 6 MM) 32G X 6 MM miscellaneous  levothyroxine (SYNTHROID/LEVOTHROID) 50 MCG tablet  losartan (COZAAR) 50 MG tablet  metFORMIN (GLUCOPHAGE) 500 MG tablet  metoprolol succinate ER (TOPROL-XL) 25 MG 24 hr tablet  nitroGLYcerin (NITROSTAT) 0.4 MG sublingual tablet  pioglitazone (ACTOS) 45 MG tablet  rosuvastatin (CRESTOR) 20 MG tablet  tirzepatide (MOUNJARO) 2.5 MG/0.5ML pen        Surgical  "History   Past Surgical History:   Procedure Laterality Date    IMPLANT PACEMAKER  2001    Placed a [facility]    OPEN REDUCTION INTERNAL FIXATION ANKLE Left 4/6/2020    Procedure: Open reduction internal fixation left bimalleolar ankle fracture;  Surgeon: Kaz Apple MD;  Location: [facility] OR    ORTHOPEDIC SURGERY Left     fracture repaired    REPLACE PACEMAKER GENERATOR  2010       Physical Exam     Patient Vitals for the past 24 hrs:   BP Temp Temp src Pulse Resp SpO2 Height Weight   07/06/24 0814 137/71 97.6  F (36.4  C) Oral 95 15 95 % 1.88 m (6' 2\") 117.6 kg (259 lb 4.2 oz)     Physical Exam  Constitutional: Well appearing.  HEENT: Atraumatic.  Moist mucous membranes.  Neck: Soft.  Supple.   Cardiac: Regular rate and rhythm.  No murmur or rub.  Respiratory: Clear to auscultation bilaterally.  No respiratory distress.  No wheezing, rhonchi, or rales.  Abdomen: Soft and nontender.  No guarding.  Nondistended.  Musculoskeletal: No edema.  Normal range of motion.  Neurologic: Alert and oriented x3.  Normal tone and bulk.  Normal gait.  Skin: No rashes.  No edema.  Psych: Normal affect.  Normal behavior.        Diagnostics     Lab Results   Labs Ordered and Resulted from Time of ED Arrival to Time of ED Departure   BASIC METABOLIC PANEL - Abnormal       Result Value    Sodium 137      Potassium 4.3      Chloride 100      Carbon Dioxide (CO2) 24      Anion Gap 13      Urea Nitrogen 22.2      Creatinine 1.03      GFR Estimate 74      Calcium 9.4      Glucose 238 (*)    CBC WITH PLATELETS AND DIFFERENTIAL - Abnormal    WBC Count 5.1      RBC Count 4.21 (*)     Hemoglobin 12.4 (*)     Hematocrit 39.3 (*)     MCV 93      MCH 29.5      MCHC 31.6      RDW 14.0      Platelet Count 153      % Neutrophils 67      % Lymphocytes 17      % Monocytes 13      % Eosinophils 3      % Basophils 0      % Immature Granulocytes 0      NRBCs per 100 WBC 0      Absolute Neutrophils 3.4      Absolute Lymphocytes 0.8      " Absolute Monocytes 0.7      Absolute Eosinophils 0.1      Absolute Basophils 0.0      Absolute Immature Granulocytes 0.0      Absolute NRBCs 0.0     INFLUENZA A/B, RSV, & SARS-COV2 PCR - Normal    Influenza A PCR Negative      Influenza B PCR Negative      RSV PCR Negative      SARS CoV2 PCR Negative     GROUP A STREPTOCOCCUS PCR THROAT SWAB - Normal    Group A strep by PCR Not Detected         Imaging   XR Chest 2 Views   Final Result   IMPRESSION: Dual-chamber cardiac pacer. Calcified aortic atherosclerosis. Mild degenerative change thoracic spine. Lungs clear.           EKG   ECG results from 07/06/24   EKG 12 lead     Value    Systolic Blood Pressure     Diastolic Blood Pressure     Ventricular Rate 97    Atrial Rate 97    OH Interval 142    QRS Duration 80        QTc 434    P Axis 8    R AXIS 66    T Axis 63    Interpretation ECG      Sinus rhythm with sinus arrhythmia  Nonspecific T wave abnormality  Abnormal ECG  No previous ECGs available           Independent Interpretation   CXR: No infiltrate.    ED Course      Medications Administered   Medications - No data to display    Procedures   Procedures     Discussion of Management   None    ED Course   ED Course as of 07/06/24 0849   Sat Jul 06, 2024   0836 I obtained history and performed physical exam as noted above.        Optional/Additional Documentation  None    Medical Decision Making / Diagnosis     CMS Diagnoses: None    MIPS       None    MDM   Gaetano Sheriff is a 78 year old male who is afebrile and hemodynamically stable.  Lungs are clear to auscultation.  Has no hypoxia.  He appears well and nontoxic and is hydrated.  X-ray without acute infiltrate or other abnormalities.  Lab workup with respiratory swab is negative.  His wife is being treated for bacterial pneumonia and I discussed that it should be prudent to start him on antibiotics as he continues to have ongoing cough that is worsening here over the last week or so.  We discussed this  may still be viral but we will start him on antibiotics.  Notification for IV antibiotics or admission to the hospital and he feels comfortable discharging home.  I stressed close primary care follow-up and very strict return precautions were given.  His questions were answered and he was in no distress at time of discharge.    Disposition   The patient was discharged.     Diagnosis     ICD-10-CM    1. Acute cough  R05.1            Discharge Medications   Discharge Medication List as of 7/6/2024 10:17 AM        START taking these medications    Details   amoxicillin-clavulanate (AUGMENTIN) 875-125 MG tablet Take 1 tablet by mouth 2 times daily for 10 days, Disp-20 tablet, R-0, E-Prescribe      doxycycline hyclate (VIBRAMYCIN) 100 MG capsule Take 1 capsule (100 mg) by mouth 2 times daily for 10 days, Disp-20 capsule, R-0, E-Prescribe               Scribe Disclosure:  Darrell DIGGS, am serving as a scribe at 8:48 AM on 7/6/2024 to document services personally performed by Jules Saleh MD based on my observations and the provider's statements to me.        Jules Saleh MD  07/07/24 2055

## 2024-07-06 NOTE — ED TRIAGE NOTES
Pt comes in with cough when laying down for last week headache, body aches, productive cough, sore throat, tight chest, weakness, diarrhea and light headed for the last week and has been progressively getting worse. Pt wife was seen in the clinic last week dx. With pneumonia and started on a antibiotic. Pt took a COVID test 4 days and it was negative.     Triage Assessment (Adult)       Row Name 07/06/24 0816          Triage Assessment    Airway WDL WDL        Respiratory WDL    Respiratory WDL all     Cough Frequency frequent     Cough Type productive;good        Skin Circulation/Temperature WDL    Skin Circulation/Temperature WDL WDL        Cardiac WDL    Cardiac WDL WDL        Peripheral/Neurovascular WDL    Peripheral Neurovascular WDL WDL        Cognitive/Neuro/Behavioral WDL    Cognitive/Neuro/Behavioral WDL WDL

## 2024-07-06 NOTE — DISCHARGE INSTRUCTIONS
Discharge Instructions  Bronchitis, Pneumonia, Bronchospasm    You were seen today for a chest infection or inflammation. If your provider decided this was due to a bacterial infection, you may need an antibiotic. Sometimes these are caused by a virus, and then an antibiotic will not help.      Generally, every Emergency Department visit should have a follow-up clinic visit with either a primary or a specialty clinic/provider. Please follow-up as instructed by your emergency provider today.     Return to the Emergency Department if:   Your breathing gets much worse.   You are very weak, or feel much more ill.   You develop new symptoms, such as chest pain.   You cough up blood.   You are vomiting (throwing up) enough that you cannot keep fluids or your medicine down.     What can I do to help myself?     Fill any prescriptions the provider gave you and take them right away--especially antibiotics. Be sure to finish the whole antibiotic prescription.   You may be given a prescription for an inhaler, which can help loosen tight air passages.  Use this as needed, but not more often than directed. Inhalers work much better when used with a spacer.      You may be given a prescription for a steroid to reduce inflammation. Used long-term, these can have side effects, but for short-term use they are safe. You may notice restlessness or increased appetite.         You may use non-prescription cough or cold medicines. Cough medicines may help, but don't make the cough go away completely.      Avoid smoke, because this can make your symptoms worse. If you smoke, this may be a good time to quit! Consider using nicotine lozenges, gum, or patches to reduce cravings.      If you have a fever, Tylenol  (acetaminophen), Motrin  (ibuprofen), or Advil  (ibuprofen) may help bring fever down and may help you feel more comfortable. Be sure to read and follow the package directions, and ask your provider if you have questions.     Be  sure to get your flu shot each year.  For certain ages, the pneumonia shot can help prevent pneumonia.  If you were given a prescription for medicine here today, be sure to read all of the information (including the package insert) that comes with your prescription.  This will include important information about the medicine, its side effects, and any warnings that you need to know about.  The pharmacist who fills the prescription can provide more information and answer questions you may have about the medicine.  If you have questions or concerns that the pharmacist cannot address, please call or return to the Emergency Department.      Remember that you can always come back to the Emergency Department if you are not able to see your regular provider in the amount of time listed above, if you get any new symptoms, or if there is anything that worries you.

## 2024-07-08 LAB
ATRIAL RATE - MUSE: 97 BPM
DIASTOLIC BLOOD PRESSURE - MUSE: NORMAL MMHG
INTERPRETATION ECG - MUSE: NORMAL
P AXIS - MUSE: 8 DEGREES
PR INTERVAL - MUSE: 142 MS
QRS DURATION - MUSE: 80 MS
QT - MUSE: 342 MS
QTC - MUSE: 434 MS
R AXIS - MUSE: 66 DEGREES
SYSTOLIC BLOOD PRESSURE - MUSE: NORMAL MMHG
T AXIS - MUSE: 63 DEGREES
VENTRICULAR RATE- MUSE: 97 BPM

## 2024-07-08 NOTE — PROGRESS NOTES
Assessment & Plan   Problem List Items Addressed This Visit    None  Visit Diagnoses       Other specified cough    -  Primary           1. Other specified cough  He is feeling better, is on both antibiotics and tolerating them well.               FUTURE APPOINTMENTS:       - Follow-up visit as needed. We manage his chronic medical care.    No follow-ups on file.    Yvonne Helms MD  [facility]    Subjective     Nursing Notes:   Michael Edmonds KATEY  7/9/2024 10:58 AM  Signed  Chief Complaint   Patient presents with    ER F/U     7/6/2024 (1 hours), [facility] Emergency Dept, improving, put on meds for pnemonia     Pre-visit Screening:  Immunizations:  up to date  Colonoscopy:  is up to date  Mammogram: na  Asthma Action Test/Plan:  na  PHQ9:  na  GAD7:  na  Questioned patient about current smoking habits Pt. has never smoked.  Ok to leave detailed message on voice mail for today's visit only yes, phone # [phone] (home)          Gaetano Sheriff is a 78 year old male who presents to clinic today for the following health issues   HPI     Here for ER followup, pneumonia. Was started on abx, on 07.06. wife also has had pneumonia. He is on doxycycline and augmentin. Is doing well on the medications, is feeling better. Clear lungs on chest xray.  Here with wife. He was miserable and went in to the ER.  He was coughing a lot. But this is much better.         Review of Systems   Constitutional, HEENT, cardiovascular, pulmonary, gi and gu systems are negative, except as otherwise noted.      Objective    /68 (BP Location: Right arm, Patient Position: Sitting, Cuff Size: Adult Large)   Pulse 80   Temp 96.9  F (36.1  C) (Temporal)   SpO2 97%   There is no height or weight on file to calculate BMI.  Physical Exam   GENERAL: alert and no distress  RESP: lungs clear to auscultation - no rales, rhonchi or wheezes  CV: regular rate and rhythm, normal S1 S2, no S3 or S4, no murmur, click or  rub, no peripheral edema  MS: no gross musculoskeletal defects noted, no edema  NEURO: Normal strength and tone, mentation intact and speech normal  PSYCH: mentation appears normal, affect normal/bright    No results found for any visits on 07/09/24.

## 2024-07-09 ENCOUNTER — OFFICE VISIT (OUTPATIENT)
Dept: FAMILY MEDICINE | Facility: CLINIC | Age: 79
End: 2024-07-09

## 2024-07-09 VITALS
TEMPERATURE: 96.9 F | OXYGEN SATURATION: 97 % | HEART RATE: 80 BPM | SYSTOLIC BLOOD PRESSURE: 136 MMHG | DIASTOLIC BLOOD PRESSURE: 68 MMHG

## 2024-07-09 DIAGNOSIS — R05.8 OTHER SPECIFIED COUGH: Primary | ICD-10-CM

## 2024-07-09 PROCEDURE — G2211 COMPLEX E/M VISIT ADD ON: HCPCS | Performed by: FAMILY MEDICINE

## 2024-07-09 PROCEDURE — 99213 OFFICE O/P EST LOW 20 MIN: CPT | Performed by: FAMILY MEDICINE

## 2024-07-09 NOTE — NURSING NOTE
Chief Complaint   Patient presents with    ER F/U     7/6/2024 (1 hours), [facility] Emergency Dept, improving, put on meds for pnemonia     Pre-visit Screening:  Immunizations:  up to date  Colonoscopy:  is up to date  Mammogram: na  Asthma Action Test/Plan:  na  PHQ9:  na  GAD7:  na  Questioned patient about current smoking habits Pt. has never smoked.  Ok to leave detailed message on voice mail for today's visit only yes, phone # [phone] (home)

## 2024-08-01 ENCOUNTER — OFFICE VISIT (OUTPATIENT)
Dept: FAMILY MEDICINE | Facility: CLINIC | Age: 79
End: 2024-08-01

## 2024-08-01 VITALS
WEIGHT: 255 LBS | TEMPERATURE: 97.3 F | RESPIRATION RATE: 20 BRPM | BODY MASS INDEX: 32.73 KG/M2 | HEIGHT: 74 IN | HEART RATE: 68 BPM | DIASTOLIC BLOOD PRESSURE: 72 MMHG | SYSTOLIC BLOOD PRESSURE: 132 MMHG

## 2024-08-01 DIAGNOSIS — E78.2 MIXED HYPERLIPIDEMIA: ICD-10-CM

## 2024-08-01 DIAGNOSIS — E11.9 TYPE 2 DIABETES MELLITUS WITHOUT COMPLICATION, WITHOUT LONG-TERM CURRENT USE OF INSULIN (H): Primary | ICD-10-CM

## 2024-08-01 DIAGNOSIS — I25.10 ATHEROSCLEROSIS OF CORONARY ARTERY OF NATIVE HEART WITHOUT ANGINA PECTORIS, UNSPECIFIED VESSEL OR LESION TYPE: ICD-10-CM

## 2024-08-01 DIAGNOSIS — E03.9 HYPOTHYROIDISM, UNSPECIFIED TYPE: ICD-10-CM

## 2024-08-01 DIAGNOSIS — I10 ESSENTIAL HYPERTENSION: ICD-10-CM

## 2024-08-01 DIAGNOSIS — Z00.00 ENCOUNTER FOR ANNUAL WELLNESS EXAM IN MEDICARE PATIENT: ICD-10-CM

## 2024-08-01 DIAGNOSIS — I48.0 PAROXYSMAL ATRIAL FIBRILLATION (H): ICD-10-CM

## 2024-08-01 LAB
ALBUMIN SERPL-MCNC: 4.2 G/DL (ref 3.6–5.1)
ALP SERPL-CCNC: 53 U/L (ref 33–130)
ALT 1742-6: 16 U/L (ref 0–32)
AST 1920-8: 17 U/L (ref 0–35)
BILIRUB SERPL-MCNC: 1.1 MG/DL (ref 0.2–1.2)
BUN SERPL-MCNC: 22 MG/DL (ref 7–25)
BUN/CREATININE RATIO: 19 (ref 6–32)
CALCIUM SERPL-MCNC: 9.6 MG/DL (ref 8.6–10.3)
CHLORIDE SERPLBLD-SCNC: 102.8 MMOL/L (ref 98–110)
CHOLEST SERPL-MCNC: 144 MG/DL (ref 0–199)
CHOLEST/HDLC SERPL: 2 {RATIO} (ref 0–5)
CO2 SERPL-SCNC: 29.8 MMOL/L (ref 20–32)
CREAT SERPL-MCNC: 1.16 MG/DL (ref 0.6–1.3)
GLUCOSE SERPL-MCNC: 170 MG/DL (ref 60–99)
HDLC SERPL-MCNC: 71 MG/DL (ref 40–150)
HEMOGLOBIN A1C: 8.8 % (ref 4–5.6)
LDLC SERPL CALC-MCNC: 48 MG/DL
POTASSIUM SERPL-SCNC: 4.59 MMOL/L (ref 3.5–5.3)
PROT SERPL-MCNC: 7.2 G/DL (ref 6.1–8.1)
SODIUM SERPL-SCNC: 138.9 MMOL/L (ref 135–146)
TRIGL SERPL-MCNC: 125 MG/DL (ref 0–149)

## 2024-08-01 PROCEDURE — 36415 COLL VENOUS BLD VENIPUNCTURE: CPT | Performed by: FAMILY MEDICINE

## 2024-08-01 PROCEDURE — 80061 LIPID PANEL: CPT | Performed by: FAMILY MEDICINE

## 2024-08-01 PROCEDURE — G0439 PPPS, SUBSEQ VISIT: HCPCS | Performed by: FAMILY MEDICINE

## 2024-08-01 PROCEDURE — 99214 OFFICE O/P EST MOD 30 MIN: CPT | Mod: 25 | Performed by: FAMILY MEDICINE

## 2024-08-01 PROCEDURE — 83036 HEMOGLOBIN GLYCOSYLATED A1C: CPT | Performed by: FAMILY MEDICINE

## 2024-08-01 PROCEDURE — 80053 COMPREHEN METABOLIC PANEL: CPT | Performed by: FAMILY MEDICINE

## 2024-08-01 NOTE — PROGRESS NOTES
Gaetano Sheriff is a 78 year old male who presents for Medicare Annual Wellness Visit.    Current providers caring for this patient include:  Patient Care Team:  Lm Garnica MD as PCP - General (Family Practice)  Lm Garnica MD as Assigned PCP  Zenaida Garg RD as Diabetes Educator (Dietitian, Registered)  Marcia Escobar RPH as Assigned MTM Pharmacist    Complete Medical and Social history reviewed with patient, outlined below.    Patient Active Problem List   Diagnosis    Acquired hypothyroidism    Acute diverticulitis    Atherosclerotic heart disease    Essential hypertension    Mixed hyperlipidemia    BMI 30.0-30.9,adult    Presence of cardiac pacemaker    Uncontrolled type 2 diabetes mellitus with hyperglycemia (H)    Type 2 diabetes mellitus without complication, without long-term current use of insulin (H)    ACP (advance care planning)    Syncope    Shoulder joint pain       Past Medical History:   Diagnosis Date    Acquired hypothyroidism     Arrhythmia     Atherosclerotic heart disease     Chronic infection     Diabetes mellitus (H)     type 2    Essential hypertension     Hyperlipidemia     Hypertension     Mixed hyperlipidemia     SSS (sick sinus syndrome) (H)     Syncope 2001    PPM placed    Type 2 diabetes mellitus without complication, without long-term current use of insulin (H) 04/02/2020    Uncontrolled type 2 diabetes mellitus with hyperglycemia (H) 01/10/2019    A1c on 8/31/20 was 8.4       Past Surgical History:   Procedure Laterality Date    IMPLANT PACEMAKER  2001    Placed a [facility]    OPEN REDUCTION INTERNAL FIXATION ANKLE Left 4/6/2020    Procedure: Open reduction internal fixation left bimalleolar ankle fracture;  Surgeon: Kaz Apple MD;  Location: [facility] OR    ORTHOPEDIC SURGERY Left     fracture repaired    REPLACE PACEMAKER GENERATOR  2010       Family History   Problem Relation Age of Onset    Diabetes Father     Diabetes Sister      "Diabetes Brother     Coronary Artery Disease No family hx of     Cerebrovascular Disease No family hx of     Colon Cancer No family hx of     Prostate Cancer No family hx of        Social History     Tobacco Use    Smoking status: Never     Passive exposure: Never    Smokeless tobacco: Never   Substance Use Topics    Alcohol use: Yes     Comment: Occas       Diet: regular, low salt/low fat  Physical Activity: active without specific exercise program  Depression Screen:    Over the past 2 weeks, patient has felt down, depressed, or hopeless:  No    Over the past 2 weeks, patient has felt little interest or pleasure in doing things: No    Functional ability/Safety screen:  Up and go test (able to get up and walk longer than 30 seconds): Passed  Patient needs assistance with: nothing  Patient's home has the following possible safety concerns: none identified  Patient has concerns about his hearing:  No  Cognitive Screen  Patient repeats three objects (ball, flag, tree)      Clock drawing test:   NORMAL  Recalls three objects after 3 minutes (ball,flag,tree):                                                                                               recalls 1 object (1 point)    Physical Exam:  /72 (BP Location: Left arm, Patient Position: Chair, Cuff Size: Adult Regular)   Pulse 68   Temp 97.3  F (36.3  C) (Temporal)   Resp 20   Ht 1.88 m (6' 2\")   Wt 115.7 kg (255 lb)   BMI 32.74 kg/m     Body mass index is 32.74 kg/m .              End of Life Planning:   Patient currently has an advanced directive: Yes.  Practitioner is supportive of decision.    Education/Counseling:   Based on review of the above information, the following items were addressed:      Elevated blood pressure - follow-up plans made      Diabetes -  access to diabetes self-management training, medical nutrition therapy, and treatment    Appropriate preventive services were discussed with this patient, including applicable screening as " appropriate for cardiovascular disease, diabetes, osteopenia/osteoporosis, and glaucoma.  As appropriate for age/gender, discussed screening for colorectal cancer, prostate cancer, breast cancer, and cervical cancer.   Checklist reviewing preventive services available has been given to the patient.              Assessment & Plan     Type 2 diabetes mellitus without complication, without long-term current use of insulin (H)  Control has slipped - presumably due to illness and time off meds, continue current medications at current doses check CGM regularly  - HEMOGLOBIN A1C (BFP)  - VENOUS COLLECTION  - Comprehensive Metobolic Panel (BFP)    Mixed hyperlipidemia  Control uncertain, continue current medications at current doses pending labs  - Lipid Panel (BFP)  - Comprehensive Metobolic Panel (BFP)    Essential hypertension  Well controlled,, continue current medications at current doses   - Comprehensive Metobolic Panel (BFP)    Atherosclerosis of coronary artery of native heart without angina pectoris, unspecified vessel or lesion type  stable symptomatically continue current medications at current doses     Paroxysmal atrial fibrillation (H)  Well controlled, continue current medications at current doses     Hypothyroidism, unspecified type  stable symptomatically     Encounter for annual wellness exam in Medicare patient  discussed preventitive healthcare Continue to work on healthy diet and exercise, discussed healthy habits   Personalized Prevention Plan  You are due for the preventive services outlined below.  Your care team is available to assist you in scheduling these services.  If you have already completed any of these items, please share that information with your care team to update in your medical record.  Health Maintenance   Topic Date Due    EYE EXAM  02/01/2022    COVID-19 Vaccine (7 - 2023-24 season) 02/19/2024    MEDICARE ANNUAL WELLNESS VISIT  03/07/2024    INFLUENZA VACCINE (1) 09/01/2024    A1C   11/01/2024    LIPID  02/13/2025    MICROALBUMIN  02/13/2025    TSH W/FREE T4 REFLEX  02/13/2025    DIABETIC FOOT EXAM  02/13/2025    ADVANCE CARE PLANNING  04/02/2025    BMP  07/06/2025    FALL RISK ASSESSMENT  07/09/2025    DTAP/TDAP/TD IMMUNIZATION (2 - Td or Tdap) 04/02/2029    HEPATITIS C SCREENING  Completed    PHQ-2 (once per calendar year)  Completed    Pneumococcal Vaccine: 65+ Years  Completed    ZOSTER IMMUNIZATION  Completed    RSV VACCINE (Pregnancy & 60+)  Completed    IPV IMMUNIZATION  Aged Out    HPV IMMUNIZATION  Aged Out    MENINGITIS IMMUNIZATION  Aged Out    RSV MONOCLONAL ANTIBODY  Aged Out                  FUTURE APPOINTMENTS:       - Follow-up visit in 6 mo  Work on weight loss  Regular exercise    No follow-ups on file.    Natalia Salter is a 78 year old, presenting for the following health issues:  Recheck Medication, Wellness Visit, and RECHECK (URI)    HPI       Diabetes [id number] ill on abx in  last month-didn't take meds for a few days due to diarrhea    How often are you checking your blood sugar? Continuous glucose monitor -sugars a little higher in last month but fine otherwise  What time of day are you checking your blood sugars (select all that apply)?  Not applicable  Have you had any blood sugars above 200?  No  Have you had any blood sugars below 70?  No  What symptoms do you notice when your blood sugar is low?  Shaky  What concerns do you have today about your diabetes? None   Do you have any of these symptoms? (Select all that apply)  Numbness in feet  Have you had a diabetic eye exam in the last 12 months? yes            Hyperlipidemia Follow-Up    Are you regularly taking any medication or supplement to lower your cholesterol?   Yes- crestor  Are you having muscle aches or other side effects that you think could be caused by your cholesterol lowering medication?  No    Hypertension Follow-up    Do you check your blood pressure regularly outside of the clinic? Yes  "  Are you following a low salt diet? No  Are your blood pressures ever more than 140 on the top number (systolic) OR more   than 90 on the bottom number (diastolic), for example 140/90? No    BP Readings from Last 2 Encounters:   08/01/24 132/72   07/09/24 136/68     Hemoglobin A1C (%)   Date Value   02/13/2024 7.6 (A)   03/07/2023 7.7 (A)     LDL Cholesterol Direct (mg/dL)   Date Value   02/13/2024 43   03/07/2023 39         Atrial Fibrillation Follow-up    Symptoms: no recent chest pain, significant palpitations, dizziness/lightheadedness, dyspnea, or increased peripheral edema.  Stroke prevention: DOAC (Eliquis, Xarelto, Pradaxa)        3/7/2023     7:22 AM 2/13/2024     9:35 AM 7/6/2024     8:14 AM 7/9/2024    10:42 AM 8/1/2024     9:08 AM   Date   Pulse 68 64 95 80 68     Current        Vascular Disease Follow-up    How often do you take nitroglycerin? Never  Do you take an aspirin every day? No    Hypothyroidism Follow-up    Since last visit, patient describes the following symptoms: Weight stable, no hair loss, no skin changes, no constipation, no loose stools  How many servings of fruits and vegetables do you eat daily?  2-3  On average, how many sweetened beverages do you drink each day (Examples: soda, juice, sweet tea, etc.  Do NOT count diet or artificially sweetened beverages)?   1  How many days per week do you exercise enough to make your heart beat faster? 4  How many minutes a day do you exercise enough to make your heart beat faster? 20 - 29  How many days per week do you miss taking your medication? 0        Review of Systems  Constitutional, HEENT, cardiovascular, pulmonary, gi and gu systems are negative, except as otherwise noted.      Objective    /72 (BP Location: Left arm, Patient Position: Chair, Cuff Size: Adult Regular)   Pulse 68   Temp 97.3  F (36.3  C) (Temporal)   Resp 20   Ht 1.88 m (6' 2\")   Wt 115.7 kg (255 lb)   BMI 32.74 kg/m    Body mass index is 32.74 " kg/m .  Physical Exam   GENERAL: alert and no distress  EYES: Eyes grossly normal to inspection, PERRL and conjunctivae and sclerae normal  HENT: ear canals and TM's normal, nose and mouth without ulcers or lesions  NECK: no adenopathy, no asymmetry, masses, or scars  RESP: lungs clear to auscultation - no rales, rhonchi or wheezes  CV: regular rate and rhythm, normal S1 S2, no S3 or S4, no murmur, click or rub, no peripheral edema  ABDOMEN: soft, nontender, no hepatosplenomegaly, no masses and bowel sounds normal  MS: no gross musculoskeletal defects noted, no edema  PSYCH: mentation appears normal, affect normal/bright    Results for orders placed or performed in visit on 08/01/24 (from the past 24 hour(s))   HEMOGLOBIN A1C (BFP)   Result Value Ref Range    Hemoglobin A1C 8.8 (A) 4 - 5.6 %           Signed Electronically by: Lm Garnica MD

## 2024-08-01 NOTE — NURSING NOTE
Gaetano Sheriff is here for an A1C, recheck of URI and wellness.    Questioned patient about current smoking habits.  Pt. has never smoked.  PULSE regular  My Chart: active  CLASSIFICATION OF OVERWEIGHT AND OBESITY BY BMI                        Obesity Class           BMI(kg/m2)  Underweight                                    < 18.5  Normal                                         18.5-24.9  Overweight                                     25.0-29.9  OBESITY                     I                  30.0-34.9                             II                 35.0-39.9  EXTREME OBESITY             III                >40                            Patient's  BMI Body mass index is 32.74 kg/m .  http://hin.nhlbi.nih.gov/menuplanner/menu.cgi  Pre-visit planning  Immunizations - up to date  Colonoscopy -   Mammogram -   Asthma -   PHQ9 -    SHERWIN-7 -      The patient has verbalized that it is ok to leave a detailed voice message on the patient's cell phone with results/recommendations from this visit.

## 2024-09-19 ENCOUNTER — OFFICE VISIT (OUTPATIENT)
Dept: FAMILY MEDICINE | Facility: CLINIC | Age: 79
End: 2024-09-19

## 2024-09-19 DIAGNOSIS — E11.9 TYPE 2 DIABETES MELLITUS WITHOUT COMPLICATION, WITHOUT LONG-TERM CURRENT USE OF INSULIN (H): Primary | ICD-10-CM

## 2024-09-19 NOTE — PROGRESS NOTES
Patient is questioning his coverage gap.    Called insurance with patient here and he still has a ways to go before getting out of the coverage gap.    Patient will obtain samples to stay on medication at this time.    Sol Escobar, PharmD  Clinical Pharmacist  [facility] Phone: [phone]  Direct Office Phone: [phone]

## 2024-12-06 ENCOUNTER — OFFICE VISIT (OUTPATIENT)
Dept: FAMILY MEDICINE | Facility: CLINIC | Age: 79
End: 2024-12-06

## 2024-12-06 VITALS
BODY MASS INDEX: 33.77 KG/M2 | OXYGEN SATURATION: 99 % | DIASTOLIC BLOOD PRESSURE: 72 MMHG | HEART RATE: 58 BPM | WEIGHT: 263 LBS | SYSTOLIC BLOOD PRESSURE: 122 MMHG

## 2024-12-06 DIAGNOSIS — B37.2 CANDIDIASIS OF SKIN: Primary | ICD-10-CM

## 2024-12-06 PROCEDURE — 99213 OFFICE O/P EST LOW 20 MIN: CPT

## 2024-12-06 RX ORDER — CLOTRIMAZOLE 1 %
CREAM (GRAM) TOPICAL 2 TIMES DAILY
Qty: 30 G | Refills: 0 | Status: SHIPPED | OUTPATIENT
Start: 2024-12-06 | End: 2024-12-13

## 2024-12-06 NOTE — PROGRESS NOTES
Assessment & Plan     1. Candidiasis of skin (Primary)  - Discussed with Gaetano symptoms/exam are most consistent with fungal/yeast skin infection. Will treat with Clotrimazole BID x 7 days, RX sent. Also encouraged him to keep skin and dry and try to wear looser clothing to prevent further irritation on skin. He will follow up if his symptoms do not resolve in next 1-2 weeks.   - clotrimazole (LOTRIMIN) 1 % external cream; Apply topically 2 times daily for 7 days.  Dispense: 30 g; Refill: 0    Follow up as needed. Reasons to follow-up sooner or seek emergent care reviewed.     Kary Espinoza PA-C  [facility]       Subjective     Gaetano Sheriff is a 79 year old male who presents to clinic today for the following health issues:    HPI   Chief Complaint   Patient presents with    Consult For     Has redness around belly button and itching since 3-4 days ago, has tried neosporin but does not feel it is working well, has never had this before       Gaetano presents with concerns of a possible infection around his belly button. States he has never had this before. Notes about 4 days ago, he noticed that the skin around his belly button is red and itchy. Denies any pain but notes it is very irritating. He denies any fevers/chills, discharge, or odor. Has tried Neosporin ointment without benefit.     Daily medications reviewed.       Objective    /72 (BP Location: Right arm, Patient Position: Sitting, Cuff Size: Adult Large)   Pulse 58   Wt 119.3 kg (263 lb)   SpO2 99%   BMI 33.77 kg/m    Body mass index is 33.77 kg/m .    Physical Examination:  GENERAL: healthy, alert and no distress  EYES: Eyes grossly normal to inspection  RESP: no audible wheezing  MS: no gross musculoskeletal defects noted, no edema  SKIN: dry, irritated, erythematous skin noted around outer belly button area, no pain, discharge, or odor  PSYCH: mentation appears normal, affect normal/bright

## 2024-12-06 NOTE — NURSING NOTE
Chief Complaint   Patient presents with    Consult For     Has redness around belly button and itching since 3-4 days ago, has tried neosporin but does not feel it is working well, has never had this before      Pre-visit Screening:  Immunizations:  up to date  Colonoscopy:  is up to date  Mammogram: na  Asthma Action Test/Plan:  na  PHQ9:  na  GAD7:  na  Questioned patient about current smoking habits Pt. has never smoked.  Ok to leave detailed message on voice mail for today's visit only yes, phone # [phone] (home)

## 2024-12-22 ENCOUNTER — HEALTH MAINTENANCE LETTER (OUTPATIENT)
Age: 79
End: 2024-12-22

## 2025-01-19 DIAGNOSIS — E11.9 TYPE 2 DIABETES MELLITUS WITHOUT COMPLICATION, WITHOUT LONG-TERM CURRENT USE OF INSULIN (H): ICD-10-CM

## 2025-01-20 RX ORDER — GLIMEPIRIDE 4 MG/1
4 TABLET ORAL
COMMUNITY
Start: 2025-01-20

## 2025-01-20 RX ORDER — LEVOTHYROXINE SODIUM 50 UG/1
50 TABLET ORAL DAILY
COMMUNITY
Start: 2025-01-20

## 2025-01-20 RX ORDER — PIOGLITAZONE 45 MG/1
45 TABLET ORAL DAILY
COMMUNITY
Start: 2025-01-20

## 2025-01-20 NOTE — TELEPHONE ENCOUNTER
Gaetano Sheriff is requesting a refill of:    Refused Prescriptions:                       Disp   Refills    glimepiride (AMARYL) 4 MG tablet [Pharmacy*                Sig: Take 1 tablet by mouth once daily with breakfast  Refused By: MIKE MAYS  Reason for Refusal: Patient needs appointment    levothyroxine (SYNTHROID/LEVOTHROID) 50 MC*                Sig: Take 1 tablet by mouth once daily  Refused By: MIKE MAYS  Reason for Refusal: Patient needs appointment    pioglitazone (ACTOS) 45 MG tablet [Pharmac*                Sig: Take 1 tablet by mouth once daily  Refused By: MIKE MAYS  Reason for Refusal: Patient needs appointment    Needs fasting OV for refills

## 2025-01-27 ENCOUNTER — OFFICE VISIT (OUTPATIENT)
Dept: FAMILY MEDICINE | Facility: CLINIC | Age: 80
End: 2025-01-27

## 2025-01-27 VITALS
SYSTOLIC BLOOD PRESSURE: 126 MMHG | RESPIRATION RATE: 20 BRPM | BODY MASS INDEX: 34.14 KG/M2 | DIASTOLIC BLOOD PRESSURE: 70 MMHG | HEIGHT: 74 IN | TEMPERATURE: 97.5 F | WEIGHT: 266 LBS | HEART RATE: 60 BPM

## 2025-01-27 DIAGNOSIS — I48.0 PAROXYSMAL ATRIAL FIBRILLATION (H): ICD-10-CM

## 2025-01-27 DIAGNOSIS — E11.9 TYPE 2 DIABETES MELLITUS WITHOUT COMPLICATION, WITHOUT LONG-TERM CURRENT USE OF INSULIN (H): Primary | ICD-10-CM

## 2025-01-27 DIAGNOSIS — I25.10 ATHEROSCLEROSIS OF CORONARY ARTERY OF NATIVE HEART WITHOUT ANGINA PECTORIS, UNSPECIFIED VESSEL OR LESION TYPE: ICD-10-CM

## 2025-01-27 DIAGNOSIS — E78.2 MIXED HYPERLIPIDEMIA: ICD-10-CM

## 2025-01-27 DIAGNOSIS — E03.9 HYPOTHYROIDISM, UNSPECIFIED TYPE: ICD-10-CM

## 2025-01-27 DIAGNOSIS — I10 ESSENTIAL HYPERTENSION: ICD-10-CM

## 2025-01-27 LAB
ALBUMIN SERPL-MCNC: 3.9 G/DL (ref 3.6–5.1)
ALP SERPL-CCNC: 53 U/L (ref 33–130)
ALT 1742-6: 13 U/L (ref 0–32)
AST 1920-8: 15 U/L (ref 0–35)
BILIRUB SERPL-MCNC: 1 MG/DL (ref 0.2–1.2)
BUN SERPL-MCNC: 23 MG/DL (ref 7–25)
BUN/CREATININE RATIO: 17 (ref 6–32)
CALCIUM SERPL-MCNC: 9.6 MG/DL (ref 8.6–10.3)
CHLORIDE SERPLBLD-SCNC: 105.7 MMOL/L (ref 98–110)
CHOLEST SERPL-MCNC: 132 MG/DL (ref 0–199)
CHOLEST/HDLC SERPL: 2 {RATIO} (ref 0–5)
CO2 SERPL-SCNC: 26.9 MMOL/L (ref 20–32)
CREAT SERPL-MCNC: 1.38 MG/DL (ref 0.6–1.3)
GFR SERPL CREATININE-BSD FRML MDRD: 52 ML/MIN/1.73M2
GLUCOSE SERPL-MCNC: 129 MG/DL (ref 60–99)
HDLC SERPL-MCNC: 66 MG/DL (ref 40–150)
HEMOGLOBIN A1C: 9 % (ref 4–5.6)
LDLC SERPL CALC-MCNC: 44 MG/DL (ref 0–129)
POTASSIUM SERPL-SCNC: 4.74 MMOL/L (ref 3.5–5.3)
PROT SERPL-MCNC: 7 G/DL (ref 6.1–8.1)
SODIUM SERPL-SCNC: 142.4 MMOL/L (ref 135–146)
TRIGL SERPL-MCNC: 110 MG/DL (ref 0–149)

## 2025-01-27 PROCEDURE — 80061 LIPID PANEL: CPT | Performed by: FAMILY MEDICINE

## 2025-01-27 PROCEDURE — 99214 OFFICE O/P EST MOD 30 MIN: CPT | Performed by: FAMILY MEDICINE

## 2025-01-27 PROCEDURE — 36415 COLL VENOUS BLD VENIPUNCTURE: CPT | Performed by: FAMILY MEDICINE

## 2025-01-27 PROCEDURE — 83036 HEMOGLOBIN GLYCOSYLATED A1C: CPT | Performed by: FAMILY MEDICINE

## 2025-01-27 PROCEDURE — 80053 COMPREHEN METABOLIC PANEL: CPT | Performed by: FAMILY MEDICINE

## 2025-01-27 PROCEDURE — G2211 COMPLEX E/M VISIT ADD ON: HCPCS | Performed by: FAMILY MEDICINE

## 2025-01-27 RX ORDER — PIOGLITAZONE 45 MG/1
45 TABLET ORAL DAILY
Qty: 90 TABLET | Refills: 3 | Status: SHIPPED | OUTPATIENT
Start: 2025-01-27

## 2025-01-27 RX ORDER — GLIMEPIRIDE 4 MG/1
4 TABLET ORAL
Qty: 90 TABLET | Refills: 3 | Status: SHIPPED | OUTPATIENT
Start: 2025-01-27

## 2025-01-27 RX ORDER — LEVOTHYROXINE SODIUM 50 UG/1
50 TABLET ORAL DAILY
Qty: 90 TABLET | Refills: 3 | Status: SHIPPED | OUTPATIENT
Start: 2025-01-27

## 2025-01-27 NOTE — PROGRESS NOTES
"  Assessment & Plan     Type 2 diabetes mellitus without complication, without long-term current use of insulin (H)  Suboptimal control, will have pt meet with mtm again- should likely be on GLP-1 or insulin  - HEMOGLOBIN A1C (BFP)  - VENOUS COLLECTION    Mixed hyperlipidemia  Control uncertain, continue current medications at current doses pending labs    Essential hypertension  Well controlled, continue current medications at current doses     Atherosclerosis of coronary artery of native heart without angina pectoris, unspecified vessel or lesion type  stable symptomatically continue current medications at current doses     Paroxysmal atrial fibrillation (H)  Well controlled, continue current medications at current doses     Hypothyroidism, unspecified type  stable symptomatically continue current medications at current doses             BMI  Estimated body mass index is 34.15 kg/m  as calculated from the following:    Height as of this encounter: 1.88 m (6' 2\").    Weight as of this encounter: 120.7 kg (266 lb).   Weight management plan: Discussed healthy diet and exercise guidelines      FUTURE APPOINTMENTS:       - Follow-up visit in 3 mo  Work on weight loss  Regular exercise    No follow-ups on file.    Natalia Salter is a 79 year old, presenting for the following health issues:  Recheck Medication    HPI       Diabetes Follow-up    How often are you checking your blood sugar? A few times a week  140  What time of day are you checking your blood sugars (select all that apply)?  Before meals  Have you had any blood sugars above 200?  Yes a few  Have you had any blood sugars below 70?  No  What symptoms do you notice when your blood sugar is low?  Shaky  What concerns do you have today about your diabetes? None   Do you have any of these symptoms? (Select all that apply)  No numbness or tingling in feet.  No redness, sores or blisters on feet.  No complaints of excessive thirst.  No reports of blurry " vision.  No significant changes to weight.  Have you had a diabetic eye exam in the last 12 months? yes            Hyperlipidemia Follow-Up    Are you regularly taking any medication or supplement to lower your cholesterol?   Yes- crestor  Are you having muscle aches or other side effects that you think could be caused by your cholesterol lowering medication?  No    Hypertension Follow-up    Do you check your blood pressure regularly outside of the clinic? Yes   Are you following a low salt diet? No  Are your blood pressures ever more than 140 on the top number (systolic) OR more   than 90 on the bottom number (diastolic), for example 140/90? No    BP Readings from Last 2 Encounters:   01/27/25 126/70   12/06/24 122/72     Hemoglobin A1C (%)   Date Value   08/01/2024 8.8 (A)   02/13/2024 7.6 (A)   03/07/2023 7.7 (A)     LDL Cholesterol Direct (mg/dL)   Date Value   02/13/2024 43   03/07/2023 39         Atrial Fibrillation Follow-up    Symptoms: no recent chest pain, significant palpitations, dizziness/lightheadedness, dyspnea, or increased peripheral edema.  Stroke prevention: DOAC (Eliquis, Xarelto, Pradaxa)        7/6/2024     8:14 AM 7/9/2024    10:42 AM 8/1/2024     9:08 AM 12/6/2024    11:21 AM 1/27/2025    12:46 PM   Date   Pulse 95 80 68 58 60     Current        Vascular Disease Follow-up    How often do you take nitroglycerin? Never  Do you take an aspirin every day? No    Hypothyroidism Follow-up    Since last visit, patient describes the following symptoms: Weight stable, no hair loss, no skin changes, no constipation, no loose stools  How many servings of fruits and vegetables do you eat daily?  2-3  On average, how many sweetened beverages do you drink each day (Examples: soda, juice, sweet tea, etc.  Do NOT count diet or artificially sweetened beverages)?   1  How many days per week do you exercise enough to make your heart beat faster? 6  How many minutes a day do you exercise enough to make your heart  "beat faster? 20 - 29  How many days per week do you miss taking your medication? 0        Review of Systems  Constitutional, HEENT, cardiovascular, pulmonary, gi and gu systems are negative, except as otherwise noted.      Objective    /70 (BP Location: Left arm, Patient Position: Chair, Cuff Size: Adult Large)   Pulse 60   Temp 97.5  F (36.4  C) (Temporal)   Resp 20   Ht 1.88 m (6' 2\")   Wt 120.7 kg (266 lb)   BMI 34.15 kg/m    Body mass index is 34.15 kg/m .  Physical Exam   GENERAL: alert and no distress  EYES: Eyes grossly normal to inspection, PERRL and conjunctivae and sclerae normal  HENT: ear canals and TM's normal, nose and mouth without ulcers or lesions  NECK: no adenopathy, no asymmetry, masses, or scars  RESP: lungs clear to auscultation - no rales, rhonchi or wheezes  CV: regular rate and rhythm, normal S1 S2, no S3 or S4, no murmur, click or rub, no peripheral edema  ABDOMEN: soft, nontender, no hepatosplenomegaly, no masses and bowel sounds normal  MS: no gross musculoskeletal defects noted, no edema  PSYCH: mentation appears normal, affect normal/bright    A1C 9.0        Signed Electronically by: Lm Garnica MD      "

## 2025-01-27 NOTE — Clinical Note
Pt likely would benefit from GLP-1 vs insulin- looks like you have helped with samples- can ypu check in with him ?  Needs an improved plan it appears :)

## 2025-01-27 NOTE — NURSING NOTE
Gaetano CAN Sheriff is here for a diabetic check and medication refill.    Questioned patient about current smoking habits.  Pt. has never smoked.  Body mass index is 32.74 kg/m .  PULSE regular  My Chart: active  CLASSIFICATION OF OVERWEIGHT AND OBESITY BY BMI                        Obesity Class           BMI(kg/m2)  Underweight                                    < 18.5  Normal                                         18.5-24.9  Overweight                                     25.0-29.9  OBESITY                     I                  30.0-34.9                             II                 35.0-39.9  EXTREME OBESITY             III                >40                            Patient's  BMI Body mass index is 32.74 kg/m .  http://hin.nhlbi.nih.gov/menuplanner/menu.cgi    Pre-visit planning  Immunizations - up to date  Colonoscopy -   Mammogram -   Asthma -   PHQ9 -    SHERWIN-7 -      The patient has verbalized that it is ok to leave a detailed voice message on the patient's cell phone with results/recommendations from this visit.

## 2025-01-28 LAB — TSH SERPL-ACNC: 4.2 MIU/L (ref 0.4–4.5)

## 2025-01-30 ENCOUNTER — OFFICE VISIT (OUTPATIENT)
Dept: FAMILY MEDICINE | Facility: CLINIC | Age: 80
End: 2025-01-30

## 2025-01-30 DIAGNOSIS — E11.9 TYPE 2 DIABETES MELLITUS WITHOUT COMPLICATION, WITHOUT LONG-TERM CURRENT USE OF INSULIN (H): Primary | ICD-10-CM

## 2025-01-30 NOTE — PROGRESS NOTES
SUBJECTIVE/OBJECTIVE:                Gaetano Sheriff is a 79 year old male seen for a follow-up visit for Medication Management Services.  He was referred to me from Dr. Lm Garnica.     Chief Complaint: Follow up from [facility] visit on 09/09/2024.      Diabetes:  Current Medications:      Current Outpatient Medications   Medication Sig Dispense Refill    apixaban ANTICOAGULANT (ELIQUIS) 5 MG tablet Take 5 mg by mouth 2 times daily      Continuous Blood Gluc Sensor (FREESTYLE ABBI 14 DAY SENSOR) MISC Change every 14 days. 2 each 5    empagliflozin (JARDIANCE) 25 MG TABS tablet Take 1 tablet (25 mg) by mouth daily. 90 tablet 3    glimepiride (AMARYL) 4 MG tablet Take 1 tablet (4 mg) by mouth daily (with breakfast). 90 tablet 3    insulin pen needle (32G X 6 MM) 32G X 6 MM miscellaneous Use 1 pen needle weekly or as directed. 100 each 0    levothyroxine (SYNTHROID/LEVOTHROID) 50 MCG tablet Take 1 tablet (50 mcg) by mouth daily. 90 tablet 3    losartan (COZAAR) 50 MG tablet Take 50 mg by mouth daily      metFORMIN (GLUCOPHAGE) 500 MG tablet Take 1 tablet (500 mg) by mouth 2 times daily (with meals). 90 tablet 3    metoprolol succinate ER (TOPROL-XL) 25 MG 24 hr tablet Take 25 mg by mouth daily      nitroGLYcerin (NITROSTAT) 0.4 MG sublingual tablet nitroglycerin 0.4 mg sublingual tablet      pioglitazone (ACTOS) 45 MG tablet Take 1 tablet (45 mg) by mouth daily. 90 tablet 3    rosuvastatin (CRESTOR) 20 MG tablet Take 20 mg by mouth daily       No current facility-administered medications for this visit.       We discussed the benefits and risks of each medication.  Patient Questions/Concerns:  A1c increase  Labs:  Lab Results   Component Value Date    A1C 9.0 01/27/2025    A1C 8.8 08/01/2024    A1C 7.6 02/13/2024    A1C 7.7 03/07/2023    A1C 7.9 09/12/2022    A1C 7.9 06/09/2022    A1C 9.1 01/25/2022       ASSESSMENT/PLAN:                Diabetes:  Assessment: Patient had appointment with PCP recently where A1c  had increased to 9.0. Patient admits that his diet was not the best over the holidays, however also discussed with patient that some of this is likely disease progression.    I would like for patient to increase to Ozempic 0.5mg as the 0.25mg he has been taking is not a therapeutic dose. If tolerating after one month and still struggling with control, would be willing to increase to Ozempic 1mg weekly.    Patient has started wearing Mendel sensor again, and he agrees to be more mindful of his diet.    Recommend repeat A1c in 3 months, and will continue to taper Ozempic as needed as tolerated.    Status: Diabetes control slipping    Drug Therapy Problems:  1) Dose of Ozempic not therapeutic.    Plan:  1) Increase Ozempic to 0.5mg for 4 weeks. If numbers not improving significantly, will consider increasing to 1mg weekly.      I spent 45 minutes with this patient today.  All changes were made via collaborative practice agreement with Lm Garnica. A copy of the visit note was provided to the patient's primary care provider.    Sol Escobar, PharmD  Clinical Pharmacist  [facility]  General [facility] Phone: [phone]  Direct Office Phone: [phone]

## 2025-02-25 DIAGNOSIS — E11.9 TYPE 2 DIABETES MELLITUS WITHOUT COMPLICATION, WITHOUT LONG-TERM CURRENT USE OF INSULIN (H): ICD-10-CM

## 2025-02-25 RX ORDER — FLASH GLUCOSE SENSOR
KIT MISCELLANEOUS
Qty: 2 EACH | Refills: 5 | Status: SHIPPED | OUTPATIENT
Start: 2025-02-25

## 2025-02-25 NOTE — TELEPHONE ENCOUNTER
Received incoming refill request for  Pending Prescriptions:                       Disp   Refills    Continuous Glucose Sensor (FREESTYLE LIBR*       5            Sig: APPLY 1 SENSOR AND CHANGE EVERY 14 DAYS AS           DIRECTED    Routing to Dr. Garnica for approval-pt last seen on 1/27/25 for medication check visit so they are up to date.

## 2025-03-06 ENCOUNTER — OFFICE VISIT (OUTPATIENT)
Dept: FAMILY MEDICINE | Facility: CLINIC | Age: 80
End: 2025-03-06

## 2025-03-06 VITALS
SYSTOLIC BLOOD PRESSURE: 114 MMHG | DIASTOLIC BLOOD PRESSURE: 66 MMHG | TEMPERATURE: 97.2 F | HEART RATE: 70 BPM | OXYGEN SATURATION: 98 %

## 2025-03-06 DIAGNOSIS — R21 RASH AND NONSPECIFIC SKIN ERUPTION: Primary | ICD-10-CM

## 2025-03-06 RX ORDER — MUPIROCIN 20 MG/G
OINTMENT TOPICAL 3 TIMES DAILY
Qty: 30 G | Refills: 0 | Status: SHIPPED | OUTPATIENT
Start: 2025-03-06 | End: 2025-03-13

## 2025-03-06 NOTE — NURSING NOTE
Chief Complaint   Patient presents with    Consult     Has a red [place] around his belly button, tried neosporin, wants it checked     Pre-visit Screening:  Immunizations:  up to date  Colonoscopy:  up to date  Mammogram: na  Asthma Action Test/Plan:  na  PHQ9:  na  GAD7:  na  Questioned patient about current smoking habits Pt. has never smoked.  Ok to leave detailed message on voice mail for today's visit only yes, phone # [phone] (home)

## 2025-03-06 NOTE — PROGRESS NOTES
Assessment & Plan     1. Rash and nonspecific skin eruption (Primary)  - Discussed with Gaetano possible staph infection in belly button, will do a trial of Mupirocin 2% ointment TID x 7 days. He will also keep belly button area clean and dry. Discussed could also try using a barrier cream such as [place] OTC or a non-scented moisturizer to provide as a barrier on skin. He will follow up as needed. Return to clinic and ER precautions discussed.   - mupirocin (BACTROBAN) 2 % external ointment; Apply topically 3 times daily for 7 days.  Dispense: 30 g; Refill: 0    Follow up as needed. Reasons to follow-up sooner or seek emergent care reviewed.     Kary Espinoza PA-C  [facility]       Subjective     Gaetano Sheriff is a 79 year old male who presents to clinic today for the following health issues:    HPI   Chief Complaint   Patient presents with    Consult     Has a red [place] around his belly button, tried neosporin, wants it checked      Gaetano presents with concerns of a possible infection around his belly button. Notes about 4-5 days ago, he noticed that the skin around his belly button is red and itchy. Denies any pain but notes it is very irritating. He denies any fevers/chills, discharge, or odor. Has tried Neosporin ointment and alcohol swabs without benefit. States he had a similar rash that happened in 12/2024 where he was prescribed Clotrimazole cream which he used for 2 days and it didn't seem to help but then the rash resolved on its own after about a week.      Daily medications reviewed.       Objective    /66 (BP Location: Left arm, Patient Position: Sitting, Cuff Size: Adult Large)   Pulse 70   Temp 97.2  F (36.2  C) (Temporal)   SpO2 98%   There is no height or weight on file to calculate BMI.    Physical Examination:  GENERAL: healthy, alert and no distress  EYES: Eyes grossly normal to inspection  RESP: no audible wheezing  MS: no gross musculoskeletal defects noted, no  edema  SKIN: dry, irritated, erythematous skin noted around outer belly button area, no pain, discharge, or odor  PSYCH: mentation appears normal, affect normal/bright

## 2025-05-04 ENCOUNTER — HEALTH MAINTENANCE LETTER (OUTPATIENT)
Age: 80
End: 2025-05-04

## 2025-05-29 ENCOUNTER — OFFICE VISIT (OUTPATIENT)
Dept: FAMILY MEDICINE | Facility: CLINIC | Age: 80
End: 2025-05-29

## 2025-05-29 VITALS
BODY MASS INDEX: 34.52 KG/M2 | TEMPERATURE: 97.5 F | RESPIRATION RATE: 20 BRPM | HEART RATE: 68 BPM | HEIGHT: 74 IN | SYSTOLIC BLOOD PRESSURE: 118 MMHG | WEIGHT: 269 LBS | DIASTOLIC BLOOD PRESSURE: 62 MMHG

## 2025-05-29 DIAGNOSIS — M54.41 ACUTE RIGHT-SIDED LOW BACK PAIN WITH RIGHT-SIDED SCIATICA: Primary | ICD-10-CM

## 2025-05-29 LAB
APPEARANCE UR: CLEAR
BACTERIA, UR: ABNORMAL
BILIRUB UR QL: ABNORMAL
CASTS/LPF: ABNORMAL
COLOR UR: YELLOW
EP/HPF: ABNORMAL
GLUCOSE URINE: ABNORMAL MG/DL
HGB UR QL: ABNORMAL
KETONES UR QL: ABNORMAL MG/DL
MISC.: ABNORMAL
NITRITE UR QL STRIP: ABNORMAL
PH UR STRIP: 5.5 PH (ref 5–7)
PROT UR QL: ABNORMAL MG/DL
RBC, UR MICRO: ABNORMAL (ref ?–2)
SP GR UR STRIP: 1.01
UROBILINOGEN UR QL STRIP: 0.2 EU/DL (ref 0.2–1)
WBC #/AREA URNS HPF: ABNORMAL /[HPF]
WBC, UR MICRO: ABNORMAL (ref ?–2)

## 2025-05-29 PROCEDURE — 99213 OFFICE O/P EST LOW 20 MIN: CPT | Performed by: FAMILY MEDICINE

## 2025-05-29 PROCEDURE — 81001 URINALYSIS AUTO W/SCOPE: CPT | Performed by: FAMILY MEDICINE

## 2025-05-29 PROCEDURE — G2211 COMPLEX E/M VISIT ADD ON: HCPCS | Performed by: FAMILY MEDICINE

## 2025-05-29 NOTE — PROGRESS NOTES
"SUBJECTIVE:   Gaetano Sheriff is a 79 year old male who complains of right buttock pain for 2 months, positional with bending or lifting, with occasional  radiation down the right leg. Precipitating factors: none recalled by the patient. Prior history of back problems: recurrent self limited episodes of low back pain in the past. There is no numbness in the legs.    Pt saw PT and has been doing exercises for sciatica- got a little better-pt admits he was not doing exercises regularly    Pt states he then rolled over while gardening last week and developed pain in right low back now- this has no improved in last week    No fecal incontinence, saddle numbness, fever, or weakness.     OBJECTIVE:  /62 (BP Location: Right arm, Patient Position: Chair, Cuff Size: Adult Large)   Pulse 68   Temp 97.5  F (36.4  C) (Temporal)   Resp 20   Ht 1.88 m (6' 2\")   Wt 122 kg (269 lb)   BMI 34.54 kg/m     Patient appears to be in mild to moderate pain, antalgic gait noted. Lumbosacral spine area reveals no local tenderness or mass.  Painful and reduced LS ROM noted. Straight leg raise is negative at 70 degrees on both sides. DTR's, motor strength and sensation normal, including heel and toe gait.  Peripheral pulses are palpable. X-Ray: not indicated.    Skin: no rash or echymoses over right flank    ASSESSMENT:   lumbar strain- normal UA and no signs kidney contusion- suspect he re- aggravated his sciatica now with symptoms  more in back    PLAN:pt will do home stretches from PT twice every day   Discussed longer term treatment plan of prn NSAID's and discussed a home back care exercise program with flexion exercise routine. Proper lifting with avoidance of heavy lifting discussed. Consider Physical Therapy and XRay studies if not improving. Call or return to clinic prn if these symptoms worsen or fail to improve as anticipated.   "

## 2025-05-29 NOTE — NURSING NOTE
Gaetano Sheriff is here for lower right back pain for the past couple weeks after a fall in the yard. Seems to be getting worse.    Questioned patient about current smoking habits.  Pt. has never smoked.  PULSE regular  My Chart: active  CLASSIFICATION OF OVERWEIGHT AND OBESITY BY BMI                        Obesity Class           BMI(kg/m2)  Underweight                                    < 18.5  Normal                                         18.5-24.9  Overweight                                     25.0-29.9  OBESITY                     I                  30.0-34.9                             II                 35.0-39.9  EXTREME OBESITY             III                >40                            Patient's  BMI Body mass index is 34.54 kg/m .  http://hin.nhlbi.nih.gov/menuplanner/menu.cgi  Pre-visit planning  Immunizations - up to date  Colonoscopy -   Mammogram -   Asthma -   PHQ9 -    SHERWIN-7 -      The patient has verbalized that it is ok to leave a detailed voice message on the patient's cell phone with results/recommendations from this visit.

## 2025-06-26 ENCOUNTER — OFFICE VISIT (OUTPATIENT)
Dept: FAMILY MEDICINE | Facility: CLINIC | Age: 80
End: 2025-06-26

## 2025-06-26 DIAGNOSIS — E11.9 TYPE 2 DIABETES MELLITUS WITHOUT COMPLICATION, WITHOUT LONG-TERM CURRENT USE OF INSULIN (H): Primary | ICD-10-CM

## 2025-06-26 RX ORDER — HYDROCHLOROTHIAZIDE 12.5 MG/1
CAPSULE ORAL
Qty: 6 EACH | Refills: 1 | Status: SHIPPED | OUTPATIENT
Start: 2025-06-26 | End: 2025-06-26

## 2025-06-26 RX ORDER — HYDROCHLOROTHIAZIDE 12.5 MG/1
CAPSULE ORAL
Qty: 6 EACH | Refills: 1 | Status: SHIPPED | OUTPATIENT
Start: 2025-06-26

## 2025-06-26 NOTE — PROGRESS NOTES
Patient needing to switch from Mendel sensor to Mendel 3 plus sensor due to discontinuation.    Sol Escobar, PharmD  Clinical Pharmacist  [facility] Phone: [phone]  Direct Office Phone: [phone]

## 2025-07-10 ENCOUNTER — OFFICE VISIT (OUTPATIENT)
Dept: FAMILY MEDICINE | Facility: CLINIC | Age: 80
End: 2025-07-10

## 2025-07-10 DIAGNOSIS — E11.9 TYPE 2 DIABETES MELLITUS WITHOUT COMPLICATION, WITHOUT LONG-TERM CURRENT USE OF INSULIN (H): Primary | ICD-10-CM

## 2025-07-10 NOTE — PROGRESS NOTES
SUBJECTIVE/OBJECTIVE:                Gaetano Sheriff is a 79 year old male seen for a follow-up visit for Medication Management Services.  He was referred to me from Dr Lm Garnica.     Chief Complaint: Follow up from [facility] visit on 06/26/2025.      Diabetes:  Current Medications:  Current Outpatient Medications   Medication Sig Dispense Refill    apixaban ANTICOAGULANT (ELIQUIS) 5 MG tablet Take 5 mg by mouth 2 times daily      Continuous Glucose Sensor (FREESTYLE ABBI 14 DAY SENSOR) MISC APPLY 1 SENSOR AND CHANGE EVERY 14 DAYS AS DIRECTED 2 each 5    Continuous Glucose Sensor (FREESTYLE ABBI 3 PLUS SENSOR) MISC Change every 15 days. 6 each 1    empagliflozin (JARDIANCE) 25 MG TABS tablet Take 1 tablet (25 mg) by mouth daily. 90 tablet 3    glimepiride (AMARYL) 4 MG tablet Take 1 tablet (4 mg) by mouth daily (with breakfast). 90 tablet 3    insulin pen needle (32G X 6 MM) 32G X 6 MM miscellaneous Use 1 pen needle weekly or as directed. 100 each 0    levothyroxine (SYNTHROID/LEVOTHROID) 50 MCG tablet Take 1 tablet (50 mcg) by mouth daily. 90 tablet 3    losartan (COZAAR) 50 MG tablet Take 50 mg by mouth daily      metFORMIN (GLUCOPHAGE) 500 MG tablet Take 1 tablet (500 mg) by mouth 2 times daily (with meals). 90 tablet 3    metoprolol succinate ER (TOPROL-XL) 25 MG 24 hr tablet Take 25 mg by mouth daily      nitroGLYcerin (NITROSTAT) 0.4 MG sublingual tablet nitroglycerin 0.4 mg sublingual tablet      pioglitazone (ACTOS) 45 MG tablet Take 1 tablet (45 mg) by mouth daily. 90 tablet 3    rosuvastatin (CRESTOR) 20 MG tablet Take 20 mg by mouth daily      semaglutide (OZEMPIC) 2 MG/3ML pen Inject 0.5 mg subcutaneously every 7 days. 3 mL 2     No current facility-administered medications for this visit.       We discussed the benefits and risks of each medication.  Patient Questions/Concerns:  Loose stools  Labs:  Lab Results   Component Value Date    A1C 9.0 01/27/2025    A1C 8.8 08/01/2024    A1C 7.6  02/13/2024    A1C 7.7 03/07/2023    A1C 7.9 09/12/2022    A1C 7.9 06/09/2022    A1C 9.1 01/25/2022         ASSESSMENT/PLAN:                Diabetes:  Assessment: Patient has been experiencing loose stools for some time that are urgent in nature. Discussed that both metformin and Ozempic can contribute to loose stools.    Discussed with patient that typically loose stools with Ozempic will improve if on medication the longer patient is on it. Does not seem that this worsened with dose increase of Ozempic.    Recommend patient decrease metformin to 500mg daily x 5 days. If resolved, stay at 500mg daily. If improving but not resolved, discontinue metformin completely.    Recommend patient  new Mendel sensors to watch for BG increase when decreasing metformin. Likely will need to increase Ozempic dose or glimepiride dose.    Patient will call in 10 days to discuss symptoms and further course of action with medications.    Status: Diabetes control suboptimal, metformin likely causing side effects  Drug Therapy Problems:  1) Loose stools likely caused by medication  Plan:  1) Decrease metformin dose to 500mg daily x 5 days  2) If symptoms resolved, stay at 500mg daily; If symptoms improved but not resolved, discontinue metformin  3) Monitor BG with Mendel Sensor  4) Will determine further medication changes when confirmed what is causing loose stools.      I spent 45 minutes with this patient today.  All changes were made via collaborative practice agreement with Lm Garnica. A copy of the visit note was provided to the patient's primary care provider.    Sol Escobar, PharmD  Clinical Pharmacist  [facility]  General Clinic Phone: [phone]  Direct Office Phone: [phone]

## 2025-07-20 DIAGNOSIS — E11.9 TYPE 2 DIABETES MELLITUS WITHOUT COMPLICATION, WITHOUT LONG-TERM CURRENT USE OF INSULIN (H): ICD-10-CM

## 2025-07-22 NOTE — TELEPHONE ENCOUNTER
Gaetano Sheriff is requesting a refill of:    Refused Prescriptions:                       Disp   Refills    metFORMIN (GLUCOPHAGE) 500 MG tablet [Phar*                Sig: TAKE 1 TABLET BY MOUTH TWICE DAILY WITH MEALS  Refused By: MIKE MAYS  Reason for Refusal: Patient needs appointment    Needs OV for refills

## 2025-07-24 ENCOUNTER — OFFICE VISIT (OUTPATIENT)
Dept: FAMILY MEDICINE | Facility: CLINIC | Age: 80
End: 2025-07-24

## 2025-07-24 DIAGNOSIS — E11.9 TYPE 2 DIABETES MELLITUS WITHOUT COMPLICATION, WITHOUT LONG-TERM CURRENT USE OF INSULIN (H): Primary | ICD-10-CM

## 2025-07-24 NOTE — Clinical Note
Please see note. Patient has been struggling with loose stools. Decreased metformin and much improved. Will do trial off metformin and will likely need to increase Ozempic.

## 2025-07-24 NOTE — PROGRESS NOTES
SUBJECTIVE/OBJECTIVE:                Gaetano Sheriff is a 79 year old male seen for a follow-up visit for Medication Management Services.  He was referred to me from Dr. Lm Garnica.     Chief Complaint: Follow up from [facility] visit on 07/10/2025.      Diabetes:  Current Medications:  Current Outpatient Medications   Medication Sig Dispense Refill    apixaban ANTICOAGULANT (ELIQUIS) 5 MG tablet Take 5 mg by mouth 2 times daily      Continuous Glucose Sensor (FREESTYLE ABBI 14 DAY SENSOR) MISC APPLY 1 SENSOR AND CHANGE EVERY 14 DAYS AS DIRECTED 2 each 5    Continuous Glucose Sensor (FREESTYLE ABBI 3 PLUS SENSOR) MISC Change every 15 days. 6 each 1    empagliflozin (JARDIANCE) 25 MG TABS tablet Take 1 tablet (25 mg) by mouth daily. 90 tablet 3    glimepiride (AMARYL) 4 MG tablet Take 1 tablet (4 mg) by mouth daily (with breakfast). 90 tablet 3    insulin pen needle (32G X 6 MM) 32G X 6 MM miscellaneous Use 1 pen needle weekly or as directed. 100 each 0    levothyroxine (SYNTHROID/LEVOTHROID) 50 MCG tablet Take 1 tablet (50 mcg) by mouth daily. 90 tablet 3    losartan (COZAAR) 50 MG tablet Take 50 mg by mouth daily      metFORMIN (GLUCOPHAGE) 500 MG tablet Take 1 tablet (500 mg) by mouth 2 times daily (with meals). 90 tablet 3    metoprolol succinate ER (TOPROL-XL) 25 MG 24 hr tablet Take 25 mg by mouth daily      nitroGLYcerin (NITROSTAT) 0.4 MG sublingual tablet nitroglycerin 0.4 mg sublingual tablet      pioglitazone (ACTOS) 45 MG tablet Take 1 tablet (45 mg) by mouth daily. 90 tablet 3    rosuvastatin (CRESTOR) 20 MG tablet Take 20 mg by mouth daily      semaglutide (OZEMPIC) 2 MG/3ML pen Inject 0.5 mg subcutaneously every 7 days. 3 mL 2     No current facility-administered medications for this visit.       We discussed the benefits and risks of each medication.  Patient Questions/Concerns:  Loose stools improving with decrease of metformin, but still present  Labs:  Lab Results   Component Value Date     A1C 9.0 01/27/2025    A1C 8.8 08/01/2024    A1C 7.6 02/13/2024    A1C 7.7 03/07/2023    A1C 7.9 09/12/2022    A1C 7.9 06/09/2022    A1C 9.1 01/25/2022       ASSESSMENT/PLAN:                Diabetes:  Assessment: Patient has decreased metformin to 1 tablet daily and notes 4-5 out of 7 days loose bowel movements resolved. He would like to try discontinuing to see if this resolves completely, as this has been affecting his ability to be active.     Discussed with patient that discontinuing for a week would be appropriate, however we will want to monitor blood sugar as we will likely need to increase the Ozempic.    Patient will follow up a week to determine if this resolves loose stools completely and determine Ozempic increase.      Status: Loose stools improving  Drug Therapy Problems:  1) Metformin seems to be causing loose stools for patient  Plan:  1) Discontinue metformin completely for 7 days  2) Follow up in 7 days to determine Ozempic increase.      I spent 30 minutes with this patient today.  All changes were made via collaborative practice agreement with Lm Garnica. A copy of the visit note was provided to the patient's primary care provider.    Sol Escobar, PharmD  Clinical Pharmacist  [facility] Phone: [phone]  Direct Office Phone: [phone]

## 2025-08-17 ENCOUNTER — HEALTH MAINTENANCE LETTER (OUTPATIENT)
Age: 80
End: 2025-08-17

## 2025-08-26 ENCOUNTER — OFFICE VISIT (OUTPATIENT)
Dept: FAMILY MEDICINE | Facility: CLINIC | Age: 80
End: 2025-08-26

## 2025-08-26 VITALS
WEIGHT: 266 LBS | BODY MASS INDEX: 34.14 KG/M2 | HEIGHT: 74 IN | SYSTOLIC BLOOD PRESSURE: 110 MMHG | TEMPERATURE: 97.2 F | RESPIRATION RATE: 20 BRPM | DIASTOLIC BLOOD PRESSURE: 62 MMHG | HEART RATE: 68 BPM

## 2025-08-26 DIAGNOSIS — I25.10 ATHEROSCLEROSIS OF CORONARY ARTERY OF NATIVE HEART WITHOUT ANGINA PECTORIS, UNSPECIFIED VESSEL OR LESION TYPE: ICD-10-CM

## 2025-08-26 DIAGNOSIS — E03.9 HYPOTHYROIDISM, UNSPECIFIED TYPE: ICD-10-CM

## 2025-08-26 DIAGNOSIS — E11.9 TYPE 2 DIABETES MELLITUS WITHOUT COMPLICATION, WITHOUT LONG-TERM CURRENT USE OF INSULIN (H): Primary | ICD-10-CM

## 2025-08-26 DIAGNOSIS — Z00.00 ENCOUNTER FOR ANNUAL WELLNESS EXAM IN MEDICARE PATIENT: ICD-10-CM

## 2025-08-26 DIAGNOSIS — Z13.89 SCREENING FOR DIABETIC PERIPHERAL NEUROPATHY: ICD-10-CM

## 2025-08-26 DIAGNOSIS — E78.2 MIXED HYPERLIPIDEMIA: ICD-10-CM

## 2025-08-26 DIAGNOSIS — I48.0 PAROXYSMAL ATRIAL FIBRILLATION (H): ICD-10-CM

## 2025-08-26 DIAGNOSIS — I10 ESSENTIAL HYPERTENSION: ICD-10-CM

## 2025-08-26 LAB
ALBUMIN (URINE) MG/L: 80
ALBUMIN SERPL-MCNC: 4.2 G/DL (ref 3.6–5.1)
ALBUMIN URINE MG/G CR: ABNORMAL MG/G CREATININE
ALP SERPL-CCNC: 48 U/L (ref 33–130)
ALT 1742-6: 15 U/L (ref 0–32)
AST 1920-8: 17 U/L (ref 0–35)
BILIRUB SERPL-MCNC: 1.1 MG/DL (ref 0.2–1.2)
BUN SERPL-MCNC: 29 MG/DL (ref 7–25)
BUN/CREATININE RATIO: 24 (ref 6–32)
CALCIUM SERPL-MCNC: 9.2 MG/DL (ref 8.6–10.3)
CHLORIDE SERPLBLD-SCNC: 104.2 MMOL/L (ref 98–110)
CHOLEST SERPL-MCNC: 134 MG/DL (ref 0–199)
CHOLEST/HDLC SERPL: 2 {RATIO} (ref 0–5)
CO2 SERPL-SCNC: 29.9 MMOL/L (ref 20–32)
CREAT SERPL-MCNC: 1.21 MG/DL (ref 0.6–1.3)
CREATININE URINE MG/DL: 100 MG/DL
GLUCOSE SERPL-MCNC: 171 MG/DL (ref 60–99)
HDLC SERPL-MCNC: 65 MG/DL (ref 40–150)
HEMOGLOBIN A1C: 9.3 % (ref 4–5.6)
LDLC SERPL CALC-MCNC: 48 MG/DL (ref 0–129)
POTASSIUM SERPL-SCNC: 4.62 MMOL/L (ref 3.5–5.3)
PROT SERPL-MCNC: 7.1 G/DL (ref 6.1–8.1)
SODIUM SERPL-SCNC: 140.4 MMOL/L (ref 135–146)
TRIGL SERPL-MCNC: 106 MG/DL (ref 0–149)

## 2025-08-28 ENCOUNTER — OFFICE VISIT (OUTPATIENT)
Dept: FAMILY MEDICINE | Facility: CLINIC | Age: 80
End: 2025-08-28

## 2025-08-28 DIAGNOSIS — E11.9 TYPE 2 DIABETES MELLITUS WITHOUT COMPLICATION, WITHOUT LONG-TERM CURRENT USE OF INSULIN (H): Primary | ICD-10-CM

## 2025-08-28 RX ORDER — GLIMEPIRIDE 4 MG/1
4 TABLET ORAL
Qty: 180 TABLET | Refills: 0 | Status: SHIPPED | OUTPATIENT
Start: 2025-08-28

## (undated) DEVICE — IMP SCR SYN CORTEX 3.5X24MM SELF TAP SS 204.824: Type: IMPLANTABLE DEVICE | Site: ANKLE | Status: NON-FUNCTIONAL

## (undated) DEVICE — SUCTION TIP YANKAUER W/O VENT K86

## (undated) DEVICE — LINEN HALF SHEET 5512

## (undated) DEVICE — CAST PADDING 4" STERILE 9044S

## (undated) DEVICE — ESU GROUND PAD ADULT W/CORD E7507

## (undated) DEVICE — LINEN FULL SHEET 5511

## (undated) DEVICE — SU ETHILON 4-0 PS-2 18" BLACK 1667H

## (undated) DEVICE — CAST FIBERGLASS 3" ROLL WHITE 73458-00002-00

## (undated) DEVICE — TOURNIQUET CUFF 24" REPRO GREEN 60-7070-104

## (undated) DEVICE — BAG CLEAR TRASH 1.3M 39X33" P4040C

## (undated) DEVICE — LINEN ORTHO ACL PACK 5447

## (undated) DEVICE — GLOVE PROTEXIS POWDER FREE 8.0 ORTHOPEDIC 2D73ET80

## (undated) DEVICE — DRILL BIT QUICK COUPLING 2.5X110MM GOLD 310.25

## (undated) DEVICE — CAST BUCKET

## (undated) DEVICE — GLOVE PROTEXIS W/NEU-THERA 7.5  2D73TE75

## (undated) DEVICE — SU MONOCRYL 2-0 SH 27" UND Y417H

## (undated) DEVICE — PREP SCRUB SOL EXIDINE 4% CHG 4OZ 29002-404

## (undated) DEVICE — PREP CHLORAPREP 26ML TINTED ORANGE  260815

## (undated) DEVICE — DRSG ABDOMINAL 07 1/2X8" 7197D

## (undated) DEVICE — PACK LOWER EXTREMITY RIDGES

## (undated) DEVICE — CAST PADDING 4" UNSTERILE 9044

## (undated) DEVICE — GLOVE PROTEXIS MICRO 8.0  2D73PM80

## (undated) DEVICE — IMP SCR SYN CORTEX 3.5X16MM SELF TAP SS 204.816: Type: IMPLANTABLE DEVICE | Site: ANKLE | Status: NON-FUNCTIONAL

## (undated) DEVICE — DRILL BIT SYN QUICK COUPLING 3.5X110MM 310.35

## (undated) DEVICE — DRSG ADAPTIC 3X8" 6113

## (undated) DEVICE — DRAPE X-RAY TUBE 00-901169-01-OEC

## (undated) DEVICE — SPONGE RAY-TEC 4X8" 7318

## (undated) RX ORDER — ONDANSETRON 2 MG/ML
INJECTION INTRAMUSCULAR; INTRAVENOUS
Status: DISPENSED
Start: 2020-04-06

## (undated) RX ORDER — HYDROMORPHONE HYDROCHLORIDE 1 MG/ML
INJECTION, SOLUTION INTRAMUSCULAR; INTRAVENOUS; SUBCUTANEOUS
Status: DISPENSED
Start: 2020-04-06

## (undated) RX ORDER — DEXAMETHASONE SODIUM PHOSPHATE 4 MG/ML
INJECTION, SOLUTION INTRA-ARTICULAR; INTRALESIONAL; INTRAMUSCULAR; INTRAVENOUS; SOFT TISSUE
Status: DISPENSED
Start: 2020-04-06

## (undated) RX ORDER — LABETALOL 20 MG/4 ML (5 MG/ML) INTRAVENOUS SYRINGE
Status: DISPENSED
Start: 2020-04-06

## (undated) RX ORDER — OXYCODONE HYDROCHLORIDE 5 MG/1
TABLET ORAL
Status: DISPENSED
Start: 2020-04-06

## (undated) RX ORDER — LIDOCAINE HYDROCHLORIDE 10 MG/ML
INJECTION, SOLUTION EPIDURAL; INFILTRATION; INTRACAUDAL; PERINEURAL
Status: DISPENSED
Start: 2020-04-06

## (undated) RX ORDER — FENTANYL CITRATE 50 UG/ML
INJECTION, SOLUTION INTRAMUSCULAR; INTRAVENOUS
Status: DISPENSED
Start: 2020-04-06

## (undated) RX ORDER — PROPOFOL 10 MG/ML
INJECTION, EMULSION INTRAVENOUS
Status: DISPENSED
Start: 2020-04-06

## (undated) RX ORDER — IBUPROFEN 600 MG/1
TABLET, FILM COATED ORAL
Status: DISPENSED
Start: 2020-04-06

## (undated) RX ORDER — METOPROLOL TARTRATE 1 MG/ML
INJECTION, SOLUTION INTRAVENOUS
Status: DISPENSED
Start: 2020-04-06

## (undated) RX ORDER — CEFAZOLIN SODIUM 2 G/100ML
INJECTION, SOLUTION INTRAVENOUS
Status: DISPENSED
Start: 2020-04-06

## (undated) RX ORDER — BUPIVACAINE HYDROCHLORIDE 5 MG/ML
INJECTION, SOLUTION EPIDURAL; INTRACAUDAL
Status: DISPENSED
Start: 2020-04-06

## (undated) RX ORDER — HYDROXYZINE HYDROCHLORIDE 10 MG/1
TABLET, FILM COATED ORAL
Status: DISPENSED
Start: 2020-04-06

## (undated) RX ORDER — ONDANSETRON 4 MG/1
TABLET, ORALLY DISINTEGRATING ORAL
Status: DISPENSED
Start: 2020-04-06